# Patient Record
Sex: MALE | Race: WHITE | NOT HISPANIC OR LATINO | Employment: OTHER | URBAN - METROPOLITAN AREA
[De-identification: names, ages, dates, MRNs, and addresses within clinical notes are randomized per-mention and may not be internally consistent; named-entity substitution may affect disease eponyms.]

---

## 2017-10-30 ENCOUNTER — HOSPITAL ENCOUNTER (OUTPATIENT)
Dept: PHYSICAL THERAPY | Facility: HOSPITAL | Age: 82
Setting detail: RECURRING SERIES
Discharge: HOME | End: 2017-12-29
Attending: INTERNAL MEDICINE | Admitting: INTERNAL MEDICINE

## 2018-05-29 ENCOUNTER — HOSPITAL ENCOUNTER (OUTPATIENT)
Dept: PHYSICAL THERAPY | Facility: HOSPITAL | Age: 83
Setting detail: RECURRING SERIES
Discharge: HOME | End: 2018-07-24
Attending: INTERNAL MEDICINE | Admitting: INTERNAL MEDICINE

## 2023-01-18 ENCOUNTER — APPOINTMENT (OUTPATIENT)
Dept: GENERAL RADIOLOGY | Facility: HOSPITAL | Age: 88
End: 2023-01-18
Payer: MEDICARE

## 2023-01-18 ENCOUNTER — HOSPITAL ENCOUNTER (EMERGENCY)
Facility: HOSPITAL | Age: 88
Discharge: HOME | End: 2023-01-18
Attending: EMERGENCY MEDICINE | Admitting: EMERGENCY MEDICINE
Payer: MEDICARE

## 2023-01-18 ENCOUNTER — APPOINTMENT (OUTPATIENT)
Dept: CT IMAGING | Facility: HOSPITAL | Age: 88
End: 2023-01-18
Payer: MEDICARE

## 2023-01-18 VITALS
DIASTOLIC BLOOD PRESSURE: 62 MMHG | BODY MASS INDEX: 27.94 KG/M2 | TEMPERATURE: 98.9 F | RESPIRATION RATE: 19 BRPM | HEIGHT: 67 IN | WEIGHT: 178 LBS | HEART RATE: 83 BPM | SYSTOLIC BLOOD PRESSURE: 115 MMHG | OXYGEN SATURATION: 97 %

## 2023-01-18 DIAGNOSIS — E11.65 UNCONTROLLED TYPE 2 DIABETES MELLITUS WITH HYPERGLYCEMIA: ICD-10-CM

## 2023-01-18 DIAGNOSIS — R53.1 WEAKNESS: ICD-10-CM

## 2023-01-18 DIAGNOSIS — J18.9 PNEUMONIA OF RIGHT UPPER LOBE DUE TO INFECTIOUS ORGANISM: Primary | ICD-10-CM

## 2023-01-18 LAB
ALBUMIN SERPL-MCNC: 3.6 G/DL (ref 3.5–5.2)
ALBUMIN/GLOB SERPL: 1.3 G/DL
ALP SERPL-CCNC: 117 U/L (ref 39–117)
ALT SERPL W P-5'-P-CCNC: 11 U/L (ref 1–41)
ANION GAP SERPL CALCULATED.3IONS-SCNC: 11 MMOL/L (ref 5–15)
AST SERPL-CCNC: 20 U/L (ref 1–40)
B PARAPERT DNA SPEC QL NAA+PROBE: NOT DETECTED
B PERT DNA SPEC QL NAA+PROBE: NOT DETECTED
BACTERIA UR QL AUTO: NORMAL /HPF
BASOPHILS # BLD AUTO: 0 10*3/MM3 (ref 0–0.2)
BASOPHILS NFR BLD AUTO: 0.3 % (ref 0–1.5)
BILIRUB SERPL-MCNC: 0.7 MG/DL (ref 0–1.2)
BILIRUB UR QL STRIP: NEGATIVE
BUN SERPL-MCNC: 20 MG/DL (ref 8–23)
BUN/CREAT SERPL: 20.6 (ref 7–25)
C PNEUM DNA NPH QL NAA+NON-PROBE: NOT DETECTED
CALCIUM SPEC-SCNC: 9.3 MG/DL (ref 8.2–9.6)
CHLORIDE SERPL-SCNC: 96 MMOL/L (ref 98–107)
CLARITY UR: CLEAR
CO2 SERPL-SCNC: 21 MMOL/L (ref 22–29)
COLOR UR: YELLOW
CREAT SERPL-MCNC: 0.97 MG/DL (ref 0.76–1.27)
D-LACTATE SERPL-SCNC: 2.5 MMOL/L (ref 0.5–2)
DEPRECATED RDW RBC AUTO: 47.7 FL (ref 37–54)
EGFRCR SERPLBLD CKD-EPI 2021: 71.9 ML/MIN/1.73
EOSINOPHIL # BLD AUTO: 0 10*3/MM3 (ref 0–0.4)
EOSINOPHIL NFR BLD AUTO: 0 % (ref 0.3–6.2)
ERYTHROCYTE [DISTWIDTH] IN BLOOD BY AUTOMATED COUNT: 14.8 % (ref 12.3–15.4)
FLUAV SUBTYP SPEC NAA+PROBE: NOT DETECTED
FLUBV RNA ISLT QL NAA+PROBE: NOT DETECTED
GLOBULIN UR ELPH-MCNC: 2.7 GM/DL
GLUCOSE BLDC GLUCOMTR-MCNC: 350 MG/DL (ref 70–105)
GLUCOSE SERPL-MCNC: 358 MG/DL (ref 65–99)
GLUCOSE UR STRIP-MCNC: ABNORMAL MG/DL
HADV DNA SPEC NAA+PROBE: NOT DETECTED
HCOV 229E RNA SPEC QL NAA+PROBE: NOT DETECTED
HCOV HKU1 RNA SPEC QL NAA+PROBE: NOT DETECTED
HCOV NL63 RNA SPEC QL NAA+PROBE: NOT DETECTED
HCOV OC43 RNA SPEC QL NAA+PROBE: NOT DETECTED
HCT VFR BLD AUTO: 35.1 % (ref 37.5–51)
HGB BLD-MCNC: 12.2 G/DL (ref 13–17.7)
HGB UR QL STRIP.AUTO: ABNORMAL
HMPV RNA NPH QL NAA+NON-PROBE: NOT DETECTED
HOLD SPECIMEN: NORMAL
HOLD SPECIMEN: NORMAL
HPIV1 RNA ISLT QL NAA+PROBE: NOT DETECTED
HPIV2 RNA SPEC QL NAA+PROBE: NOT DETECTED
HPIV3 RNA NPH QL NAA+PROBE: NOT DETECTED
HPIV4 P GENE NPH QL NAA+PROBE: NOT DETECTED
HYALINE CASTS UR QL AUTO: NORMAL /LPF
KETONES UR QL STRIP: ABNORMAL
LEUKOCYTE ESTERASE UR QL STRIP.AUTO: NEGATIVE
LYMPHOCYTES # BLD AUTO: 0.6 10*3/MM3 (ref 0.7–3.1)
LYMPHOCYTES NFR BLD AUTO: 5 % (ref 19.6–45.3)
M PNEUMO IGG SER IA-ACNC: NOT DETECTED
MCH RBC QN AUTO: 31.1 PG (ref 26.6–33)
MCHC RBC AUTO-ENTMCNC: 34.9 G/DL (ref 31.5–35.7)
MCV RBC AUTO: 89.3 FL (ref 79–97)
MONOCYTES # BLD AUTO: 0.7 10*3/MM3 (ref 0.1–0.9)
MONOCYTES NFR BLD AUTO: 5.9 % (ref 5–12)
NEUTROPHILS NFR BLD AUTO: 10.6 10*3/MM3 (ref 1.7–7)
NEUTROPHILS NFR BLD AUTO: 88.8 % (ref 42.7–76)
NITRITE UR QL STRIP: NEGATIVE
NRBC BLD AUTO-RTO: 0 /100 WBC (ref 0–0.2)
NT-PROBNP SERPL-MCNC: 874.1 PG/ML (ref 0–1800)
PH UR STRIP.AUTO: 5.5 [PH] (ref 5–8)
PLATELET # BLD AUTO: 202 10*3/MM3 (ref 140–450)
PMV BLD AUTO: 6.3 FL (ref 6–12)
POTASSIUM SERPL-SCNC: 4.5 MMOL/L (ref 3.5–5.2)
PROT SERPL-MCNC: 6.3 G/DL (ref 6–8.5)
PROT UR QL STRIP: NEGATIVE
RBC # BLD AUTO: 3.93 10*6/MM3 (ref 4.14–5.8)
RBC # UR STRIP: NORMAL /HPF
REF LAB TEST METHOD: NORMAL
RHINOVIRUS RNA SPEC NAA+PROBE: NOT DETECTED
RSV RNA NPH QL NAA+NON-PROBE: NOT DETECTED
SARS-COV-2 RNA NPH QL NAA+NON-PROBE: NOT DETECTED
SODIUM SERPL-SCNC: 128 MMOL/L (ref 136–145)
SP GR UR STRIP: 1.02 (ref 1–1.03)
SQUAMOUS #/AREA URNS HPF: NORMAL /HPF
TROPONIN T SERPL-MCNC: <0.01 NG/ML (ref 0–0.03)
UROBILINOGEN UR QL STRIP: ABNORMAL
WBC # UR STRIP: NORMAL /HPF
WBC NRBC COR # BLD: 11.9 10*3/MM3 (ref 3.4–10.8)
WHOLE BLOOD HOLD COAG: NORMAL
WHOLE BLOOD HOLD SPECIMEN: NORMAL

## 2023-01-18 PROCEDURE — 96365 THER/PROPH/DIAG IV INF INIT: CPT

## 2023-01-18 PROCEDURE — 25010000002 AZITHROMYCIN PER 500 MG: Performed by: EMERGENCY MEDICINE

## 2023-01-18 PROCEDURE — 82962 GLUCOSE BLOOD TEST: CPT

## 2023-01-18 PROCEDURE — 25010000002 CEFTRIAXONE PER 250 MG: Performed by: EMERGENCY MEDICINE

## 2023-01-18 PROCEDURE — 85025 COMPLETE CBC W/AUTO DIFF WBC: CPT | Performed by: NURSE PRACTITIONER

## 2023-01-18 PROCEDURE — 84484 ASSAY OF TROPONIN QUANT: CPT | Performed by: NURSE PRACTITIONER

## 2023-01-18 PROCEDURE — 0202U NFCT DS 22 TRGT SARS-COV-2: CPT | Performed by: NURSE PRACTITIONER

## 2023-01-18 PROCEDURE — 36415 COLL VENOUS BLD VENIPUNCTURE: CPT

## 2023-01-18 PROCEDURE — 70450 CT HEAD/BRAIN W/O DYE: CPT

## 2023-01-18 PROCEDURE — 87040 BLOOD CULTURE FOR BACTERIA: CPT | Performed by: EMERGENCY MEDICINE

## 2023-01-18 PROCEDURE — 81001 URINALYSIS AUTO W/SCOPE: CPT | Performed by: NURSE PRACTITIONER

## 2023-01-18 PROCEDURE — 83880 ASSAY OF NATRIURETIC PEPTIDE: CPT | Performed by: NURSE PRACTITIONER

## 2023-01-18 PROCEDURE — 96367 TX/PROPH/DG ADDL SEQ IV INF: CPT

## 2023-01-18 PROCEDURE — 71045 X-RAY EXAM CHEST 1 VIEW: CPT

## 2023-01-18 PROCEDURE — 99284 EMERGENCY DEPT VISIT MOD MDM: CPT

## 2023-01-18 PROCEDURE — 83605 ASSAY OF LACTIC ACID: CPT

## 2023-01-18 PROCEDURE — C9803 HOPD COVID-19 SPEC COLLECT: HCPCS | Performed by: NURSE PRACTITIONER

## 2023-01-18 PROCEDURE — 80053 COMPREHEN METABOLIC PANEL: CPT | Performed by: NURSE PRACTITIONER

## 2023-01-18 PROCEDURE — 93005 ELECTROCARDIOGRAM TRACING: CPT | Performed by: NURSE PRACTITIONER

## 2023-01-18 RX ORDER — AZITHROMYCIN 250 MG/1
250 TABLET, FILM COATED ORAL DAILY
Qty: 4 TABLET | Refills: 0 | Status: SHIPPED | OUTPATIENT
Start: 2023-01-18

## 2023-01-18 RX ORDER — SODIUM CHLORIDE 0.9 % (FLUSH) 0.9 %
10 SYRINGE (ML) INJECTION AS NEEDED
Status: DISCONTINUED | OUTPATIENT
Start: 2023-01-18 | End: 2023-01-18 | Stop reason: HOSPADM

## 2023-01-18 RX ORDER — CEFDINIR 300 MG/1
300 CAPSULE ORAL 2 TIMES DAILY
Qty: 14 CAPSULE | Refills: 0 | Status: SHIPPED | OUTPATIENT
Start: 2023-01-18

## 2023-01-18 RX ADMIN — AZITHROMYCIN MONOHYDRATE 500 MG: 500 INJECTION, POWDER, LYOPHILIZED, FOR SOLUTION INTRAVENOUS at 19:24

## 2023-01-18 RX ADMIN — SODIUM CHLORIDE, POTASSIUM CHLORIDE, SODIUM LACTATE AND CALCIUM CHLORIDE 500 ML: 600; 310; 30; 20 INJECTION, SOLUTION INTRAVENOUS at 18:25

## 2023-01-18 RX ADMIN — CEFTRIAXONE 1 G: 1 INJECTION, POWDER, FOR SOLUTION INTRAMUSCULAR; INTRAVENOUS at 18:25

## 2023-01-18 RX ADMIN — SODIUM CHLORIDE 500 ML: 9 INJECTION, SOLUTION INTRAVENOUS at 19:24

## 2023-01-18 NOTE — ED PROVIDER NOTES
Subjective      Provider in Triage Note  Patient is a 89-year-old gentleman who comes in from home that the family states he is normally alert oriented and active and normally walks today they state he has had decreased alertness and extreme weakness which has left him unable to walk today and they are concerned.  The patient denies pain.  Denies fever chills nausea or vomiting.      History of Present Illness  Patient is having no weakness this time.  No complaints of pain.  He does have occasional cough that he reports is not new.  He had not had fever at home that was aware of.  Reports no urinary symptoms.  He did have 1 episode of vomiting last night after eating pie but not since no diarrhea.  He had not taken his medication today.  History was obtained from patient and also his wife and daughters  Review of Systems    No past medical history on file.  Paroxysmal atrial fibrillation diabetes type 2 hyperlipidemia hypertension  Allergies   Allergen Reactions   • Celebrex [Celecoxib] Hives       No past surgical history on file.    No family history on file.    Social History     Socioeconomic History   • Marital status:            Objective   Physical Exam  89-year-old male awake alert.  Generally well-developed well-nourished.  Pupils equal round react light.  Neck supple chest clear cardiovascular regular rate and rhythm abdomen soft nontender.  Extremities without tenderness or edema.  Neurologic Kal[place] without focal findings noted he has intact range of motion of all extremities without deficit.  He is able to stand and take a couple steps with she reports he is at baseline 4.  Procedures           ED Course      Results for orders placed or performed during the hospital encounter of 01/18/23   Respiratory Panel PCR w/COVID-19(SARS-CoV-2) SHAY/BUTCH/FIORDALIZA/PAD/COR/MAD/RICARDA In-House, NP Swab in UTM/VTM, 3-4 HR TAT - Swab, Nasopharynx    Specimen: Nasopharynx; Swab   Result Value Ref Range    ADENOVIRUS, PCR Not  Detected Not Detected    Coronavirus 229E Not Detected Not Detected    Coronavirus HKU1 Not Detected Not Detected    Coronavirus NL63 Not Detected Not Detected    Coronavirus OC43 Not Detected Not Detected    COVID19 Not Detected Not Detected - Ref. Range    Human Metapneumovirus Not Detected Not Detected    Human Rhinovirus/Enterovirus Not Detected Not Detected    Influenza A PCR Not Detected Not Detected    Influenza B PCR Not Detected Not Detected    Parainfluenza Virus 1 Not Detected Not Detected    Parainfluenza Virus 2 Not Detected Not Detected    Parainfluenza Virus 3 Not Detected Not Detected    Parainfluenza Virus 4 Not Detected Not Detected    RSV, PCR Not Detected Not Detected    Bordetella pertussis pcr Not Detected Not Detected    Bordetella parapertussis PCR Not Detected Not Detected    Chlamydophila pneumoniae PCR Not Detected Not Detected    Mycoplasma pneumo by PCR Not Detected Not Detected   Comprehensive Metabolic Panel    Specimen: Arm, Left; Blood   Result Value Ref Range    Glucose 358 (H) 65 - 99 mg/dL    BUN 20 8 - 23 mg/dL    Creatinine 0.97 0.76 - 1.27 mg/dL    Sodium 128 (L) 136 - 145 mmol/L    Potassium 4.5 3.5 - 5.2 mmol/L    Chloride 96 (L) 98 - 107 mmol/L    CO2 21.0 (L) 22.0 - 29.0 mmol/L    Calcium 9.3 8.2 - 9.6 mg/dL    Total Protein 6.3 6.0 - 8.5 g/dL    Albumin 3.6 3.5 - 5.2 g/dL    ALT (SGPT) 11 1 - 41 U/L    AST (SGOT) 20 1 - 40 U/L    Alkaline Phosphatase 117 39 - 117 U/L    Total Bilirubin 0.7 0.0 - 1.2 mg/dL    Globulin 2.7 gm/dL    A/G Ratio 1.3 g/dL    BUN/Creatinine Ratio 20.6 7.0 - 25.0    Anion Gap 11.0 5.0 - 15.0 mmol/L    eGFR 71.9 >60.0 mL/min/1.73   Urinalysis With Culture If Indicated - Urine, Clean Catch    Specimen: Urine, Clean Catch   Result Value Ref Range    Color, UA Yellow Yellow, Straw    Appearance, UA Clear Clear    pH, UA 5.5 5.0 - 8.0    Specific Gravity, UA 1.020 1.005 - 1.030    Glucose,  mg/dL (2+) (A) Negative    Ketones, UA Trace (A) Negative     Bilirubin, UA Negative Negative    Blood, UA Moderate (2+) (A) Negative    Protein, UA Negative Negative    Leuk Esterase, UA Negative Negative    Nitrite, UA Negative Negative    Urobilinogen, UA 0.2 E.U./dL 0.2 - 1.0 E.U./dL   BNP    Specimen: Arm, Left; Blood   Result Value Ref Range    proBNP 874.1 0.0 - 1,800.0 pg/mL   Troponin    Specimen: Arm, Left; Blood   Result Value Ref Range    Troponin T <0.010 0.000 - 0.030 ng/mL   CBC Auto Differential    Specimen: Arm, Left; Blood   Result Value Ref Range    WBC 11.90 (H) 3.40 - 10.80 10*3/mm3    RBC 3.93 (L) 4.14 - 5.80 10*6/mm3    Hemoglobin 12.2 (L) 13.0 - 17.7 g/dL    Hematocrit 35.1 (L) 37.5 - 51.0 %    MCV 89.3 79.0 - 97.0 fL    MCH 31.1 26.6 - 33.0 pg    MCHC 34.9 31.5 - 35.7 g/dL    RDW 14.8 12.3 - 15.4 %    RDW-SD 47.7 37.0 - 54.0 fl    MPV 6.3 6.0 - 12.0 fL    Platelets 202 140 - 450 10*3/mm3    Neutrophil % 88.8 (H) 42.7 - 76.0 %    Lymphocyte % 5.0 (L) 19.6 - 45.3 %    Monocyte % 5.9 5.0 - 12.0 %    Eosinophil % 0.0 (L) 0.3 - 6.2 %    Basophil % 0.3 0.0 - 1.5 %    Neutrophils, Absolute 10.60 (H) 1.70 - 7.00 10*3/mm3    Lymphocytes, Absolute 0.60 (L) 0.70 - 3.10 10*3/mm3    Monocytes, Absolute 0.70 0.10 - 0.90 10*3/mm3    Eosinophils, Absolute 0.00 0.00 - 0.40 10*3/mm3    Basophils, Absolute 0.00 0.00 - 0.20 10*3/mm3    nRBC 0.0 0.0 - 0.2 /100 WBC   Urinalysis, Microscopic Only - Urine, Clean Catch    Specimen: Urine, Clean Catch   Result Value Ref Range    RBC, UA None Seen None Seen /HPF    WBC, UA None Seen None Seen /HPF    Bacteria, UA None Seen None Seen /HPF    Squamous Epithelial Cells, UA None Seen None Seen, 0-2 /HPF    Hyaline Casts, UA None Seen None Seen /LPF    Methodology Manual Light Microscopy    POC Glucose Once    Specimen: Blood   Result Value Ref Range    Glucose 350 (H) 70 - 105 mg/dL   POC Lactate    Specimen: Blood   Result Value Ref Range    Lactate 2.5 (C) 0.5 - 2.0 mmol/L   ECG 12 Lead Altered Mental Status   Result Value Ref  "Range    QT Interval 427 ms   Green Top (Gel)   Result Value Ref Range    Extra Tube Hold for add-ons.    Lavender Top   Result Value Ref Range    Extra Tube hold for add-on    Gold Top - SST   Result Value Ref Range    Extra Tube Hold for add-ons.    Light Blue Top   Result Value Ref Range    Extra Tube Hold for add-ons.      CT Head Without Contrast    Result Date: 1/18/2023  Impression: 1. Generalized atrophy with no acute intracranial findings. 2. Chronic right maxillary sinus disease. Electronically Signed: Jose Cassidy  1/18/2023 5:58 PM EST  Workstation ID: [id number]    XR Chest 1 View    Result Date: 1/18/2023  Impression: Right upper lobe infiltrate consistent with pneumonia. Electronically Signed: Jose Ayalaink  1/18/2023 4:18 PM EST  Workstation ID: [id number]    Medications   lactated ringers bolus 500 mL (0 mL Intravenous Stopped 1/18/23 1916)   azithromycin (ZITHROMAX) 500 mg in sodium chloride 0.9 % 250 mL IVPB-VTB (0 mg Intravenous Stopped 1/18/23 2045)   cefTRIAXone (ROCEPHIN) 1 g in sodium chloride 0.9 % 100 mL IVPB (0 g Intravenous Stopped 1/18/23 1855)   sodium chloride 0.9 % bolus 500 mL (0 mL Intravenous Stopped 1/18/23 2045)     /62   Pulse 83   Temp 98.9 °F (37.2 °C)   Resp 19   Ht 170.2 cm (67\")   Wt 80.7 kg (178 lb)   SpO2 97%   BMI 27.88 kg/m²                                        MDM  Chart review: Patient had cardiology appointment September 15 for paroxysmal atrial fibrillation and second-degree AV block.  He is noted to have pacemaker and noted that he is anticoagulated for his atrial fibrillation with Eliquis.  He is noted to be predominately pacemaker dependent.  Comorbidity: As per past history  Differential: Pneumonia, UTI, viral illness,  My EKG interpretation: Ventricular paced rhythm no previous for comparison  Lab: Respiratory panel all negative.  Urinalysis no white cells no red cells no bacteria or leukocytes.  White count 11.9 with 88 segs no bands on differential. "  Troponin negative BNP normal comprehensive metabolic panel sodium 128 glucose 358.  Lactic acid 2.5  Radiology: I reviewed chest x-ray and independently interpreted.  He has mild right upper lobe infiltrate.  Discussion/treatment: Patient IV placed.  He was given 500 cc lactated Ringer's.  He was given Rocephin and Zithromax.  He had not taken his medication for his diabetes today.  Review of his chart shows his previous sodium had been 136 3 years ago.  Findings were discussed with patient and family.  Disposition was discussed they desired to go home.  He was placed on Omnicef and Zithromax to complete a 5-day course.  They are aware to return for any changes or further problems or if they feel he has not doing well at home.  Patient was evaluated using appropriate PPE      Final diagnoses:   Pneumonia of right upper lobe due to infectious organism   Weakness   Uncontrolled type 2 diabetes mellitus with hyperglycemia (HCC)       ED Disposition  ED Disposition     ED Disposition   Discharge    Condition   Stable    Comment   --             Savanna Arreguin MD  [address] [place] [address]  [phone]    Schedule an appointment as soon as possible for a visit   Next week         Medication List      New Prescriptions    azithromycin 250 MG tablet  Commonly known as: ZITHROMAX  Take 1 tablet by mouth Daily.     cefdinir 300 MG capsule  Commonly known as: OMNICEF  Take 1 capsule by mouth 2 (Two) Times a Day.           Where to Get Your Medications      These medications were sent to [id number] - [place], [place] - [address] [facility] AT [facility] [address] - [phone]  - [phone] FX  [address] [facility] [place] [address]    Phone: [phone]   · azithromycin 250 MG tablet  · cefdinir 300 MG capsule          Juan Hawk MD  01/18/23 9270

## 2023-01-18 NOTE — DISCHARGE INSTRUCTIONS
Plenty of fluids, may use Tylenol for any fever, return for any further problems increased weakness shortness of breath or as needed.  If feel he is not doing well at home return for admission.

## 2023-01-21 LAB — QT INTERVAL: 427 MS

## 2023-01-23 LAB
BACTERIA SPEC AEROBE CULT: NORMAL
BACTERIA SPEC AEROBE CULT: NORMAL

## 2023-03-06 ENCOUNTER — HOSPITAL ENCOUNTER (OUTPATIENT)
Dept: GENERAL RADIOLOGY | Facility: HOSPITAL | Age: 88
Discharge: HOME | End: 2023-03-06
Admitting: INTERNAL MEDICINE
Payer: MEDICARE

## 2023-03-06 DIAGNOSIS — J18.9 PNEUMONIA OF RIGHT UPPER LOBE DUE TO INFECTIOUS ORGANISM: ICD-10-CM

## 2023-03-06 PROCEDURE — 71046 X-RAY EXAM CHEST 2 VIEWS: CPT

## 2023-07-11 NOTE — ED NOTES
Pt alert and oriented family reports hyperglycemia at home and generalized weakness. Pt denies any c/o pain at this time   no

## 2024-02-12 ENCOUNTER — HOSPITAL ENCOUNTER (EMERGENCY)
Facility: HOSPITAL | Age: 89
Discharge: HOME | End: 2024-02-12
Attending: EMERGENCY MEDICINE | Admitting: EMERGENCY MEDICINE
Payer: MEDICARE

## 2024-02-12 VITALS
BODY MASS INDEX: 27.94 KG/M2 | DIASTOLIC BLOOD PRESSURE: 84 MMHG | HEIGHT: 67 IN | HEART RATE: 76 BPM | WEIGHT: 178 LBS | RESPIRATION RATE: 16 BRPM | OXYGEN SATURATION: 99 % | SYSTOLIC BLOOD PRESSURE: 176 MMHG | TEMPERATURE: 97.4 F
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
## 2024-02-12 DIAGNOSIS — S51.012A SKIN TEAR OF LEFT ELBOW WITHOUT COMPLICATION, INITIAL ENCOUNTER: ICD-10-CM

## 2024-02-12 PROCEDURE — 99283 EMERGENCY DEPT VISIT LOW MDM: CPT

## 2024-06-24 ENCOUNTER — HOSPITAL ENCOUNTER (OUTPATIENT)
Dept: GENERAL RADIOLOGY | Facility: HOSPITAL | Age: 89
Discharge: HOME | End: 2024-06-24
Payer: MEDICARE

## 2024-06-24 ENCOUNTER — TRANSCRIBE ORDERS (OUTPATIENT)
Dept: ADMINISTRATIVE | Facility: HOSPITAL | Age: 89
End: 2024-06-24
Payer: MEDICARE

## 2024-06-24 ENCOUNTER — LAB (OUTPATIENT)
Dept: LAB | Facility: HOSPITAL | Age: 89
End: 2024-06-24
Payer: MEDICARE

## 2024-06-24 DIAGNOSIS — I48.91 ATRIAL FIBRILLATION, UNSPECIFIED TYPE: Primary | ICD-10-CM

## 2024-06-24 DIAGNOSIS — I48.91 ATRIAL FIBRILLATION, UNSPECIFIED TYPE: ICD-10-CM

## 2024-06-24 DIAGNOSIS — R06.09 DYSPNEA ON EXERTION: ICD-10-CM

## 2024-06-24 LAB
ALBUMIN SERPL-MCNC: 3.8 G/DL (ref 3.5–5.2)
ALBUMIN/GLOB SERPL: 1.4 G/DL
ALP SERPL-CCNC: 116 U/L (ref 39–117)
ALT SERPL W P-5'-P-CCNC: 8 U/L (ref 1–41)
ANION GAP SERPL CALCULATED.3IONS-SCNC: 12 MMOL/L (ref 5–15)
AST SERPL-CCNC: 17 U/L (ref 1–40)
BASOPHILS # BLD AUTO: 0.02 10*3/MM3 (ref 0–0.2)
BASOPHILS NFR BLD AUTO: 0.4 % (ref 0–1.5)
BILIRUB SERPL-MCNC: 0.5 MG/DL (ref 0–1.2)
BUN SERPL-MCNC: 13 MG/DL (ref 8–23)
BUN/CREAT SERPL: 13.4 (ref 7–25)
CALCIUM SPEC-SCNC: 9.4 MG/DL (ref 8.2–9.6)
CHLORIDE SERPL-SCNC: 98 MMOL/L (ref 98–107)
CO2 SERPL-SCNC: 24 MMOL/L (ref 22–29)
CREAT SERPL-MCNC: 0.97 MG/DL (ref 0.76–1.27)
DEPRECATED RDW RBC AUTO: 45.1 FL (ref 37–54)
EGFRCR SERPLBLD CKD-EPI 2021: 71.4 ML/MIN/1.73
EOSINOPHIL # BLD AUTO: 0.06 10*3/MM3 (ref 0–0.4)
EOSINOPHIL NFR BLD AUTO: 1.3 % (ref 0.3–6.2)
ERYTHROCYTE [DISTWIDTH] IN BLOOD BY AUTOMATED COUNT: 13.7 % (ref 12.3–15.4)
GLOBULIN UR ELPH-MCNC: 2.8 GM/DL
GLUCOSE SERPL-MCNC: 274 MG/DL (ref 65–99)
HBA1C MFR BLD: 8.1 % (ref 4.8–5.6)
HCT VFR BLD AUTO: 34.3 % (ref 37.5–51)
HGB BLD-MCNC: 11.3 G/DL (ref 13–17.7)
IMM GRANULOCYTES # BLD AUTO: 0.01 10*3/MM3 (ref 0–0.05)
IMM GRANULOCYTES NFR BLD AUTO: 0.2 % (ref 0–0.5)
LYMPHOCYTES # BLD AUTO: 1.48 10*3/MM3 (ref 0.7–3.1)
LYMPHOCYTES NFR BLD AUTO: 32.1 % (ref 19.6–45.3)
MCH RBC QN AUTO: 29.8 PG (ref 26.6–33)
MCHC RBC AUTO-ENTMCNC: 32.9 G/DL (ref 31.5–35.7)
MCV RBC AUTO: 90.5 FL (ref 79–97)
MONOCYTES # BLD AUTO: 0.55 10*3/MM3 (ref 0.1–0.9)
MONOCYTES NFR BLD AUTO: 11.9 % (ref 5–12)
NEUTROPHILS NFR BLD AUTO: 2.49 10*3/MM3 (ref 1.7–7)
NEUTROPHILS NFR BLD AUTO: 54.1 % (ref 42.7–76)
NRBC BLD AUTO-RTO: 0 /100 WBC (ref 0–0.2)
NT-PROBNP SERPL-MCNC: 2595 PG/ML (ref 0–1800)
PLATELET # BLD AUTO: 181 10*3/MM3 (ref 140–450)
PMV BLD AUTO: 8.1 FL (ref 6–12)
POTASSIUM SERPL-SCNC: 4.6 MMOL/L (ref 3.5–5.2)
PROT SERPL-MCNC: 6.6 G/DL (ref 6–8.5)
RBC # BLD AUTO: 3.79 10*6/MM3 (ref 4.14–5.8)
SODIUM SERPL-SCNC: 134 MMOL/L (ref 136–145)
TSH SERPL DL<=0.05 MIU/L-ACNC: 3.91 UIU/ML (ref 0.27–4.2)
VIT B12 BLD-MCNC: 649 PG/ML (ref 211–946)
WBC NRBC COR # BLD AUTO: 4.61 10*3/MM3 (ref 3.4–10.8)

## 2024-06-24 PROCEDURE — 83880 ASSAY OF NATRIURETIC PEPTIDE: CPT

## 2024-06-24 PROCEDURE — 71046 X-RAY EXAM CHEST 2 VIEWS: CPT

## 2024-06-24 PROCEDURE — 80053 COMPREHEN METABOLIC PANEL: CPT

## 2024-06-24 PROCEDURE — 82607 VITAMIN B-12: CPT

## 2024-06-24 PROCEDURE — 36415 COLL VENOUS BLD VENIPUNCTURE: CPT

## 2024-06-24 PROCEDURE — 84443 ASSAY THYROID STIM HORMONE: CPT

## 2024-06-24 PROCEDURE — 85025 COMPLETE CBC W/AUTO DIFF WBC: CPT

## 2024-06-24 PROCEDURE — 83036 HEMOGLOBIN GLYCOSYLATED A1C: CPT

## 2024-11-26 ENCOUNTER — HOSPITAL ENCOUNTER (INPATIENT)
Facility: HOSPITAL | Age: 89
LOS: 13 days | Discharge: NURSING, REHAB OR LONG-TERM CARE | DRG: 947 | End: 2024-12-10
Attending: EMERGENCY MEDICINE | Admitting: INTERNAL MEDICINE
Payer: MEDICARE

## 2024-11-26 ENCOUNTER — APPOINTMENT (OUTPATIENT)
Dept: GENERAL RADIOLOGY | Facility: HOSPITAL | Age: 89
DRG: 947 | End: 2024-11-26
Payer: MEDICARE

## 2024-11-26 DIAGNOSIS — S80.211A ABRASION OF KNEE, BILATERAL: ICD-10-CM
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
## 2024-11-26 DIAGNOSIS — S80.212A ABRASION OF KNEE, BILATERAL: ICD-10-CM

## 2024-11-26 DIAGNOSIS — M25.552 LEFT HIP PAIN: ICD-10-CM

## 2024-11-26 DIAGNOSIS — M25.561 ACUTE PAIN OF BOTH KNEES: ICD-10-CM

## 2024-11-26 DIAGNOSIS — M25.562 ACUTE PAIN OF BOTH KNEES: ICD-10-CM

## 2024-11-26 DIAGNOSIS — R53.1 WEAKNESS: Primary | ICD-10-CM

## 2024-11-26 LAB
ALBUMIN SERPL-MCNC: 4 G/DL (ref 3.5–5.2)
ALBUMIN/GLOB SERPL: 1.4 G/DL
ALP SERPL-CCNC: 101 U/L (ref 39–117)
ALT SERPL W P-5'-P-CCNC: 16 U/L (ref 1–41)
ANION GAP SERPL CALCULATED.3IONS-SCNC: 14.3 MMOL/L (ref 5–15)
AST SERPL-CCNC: 29 U/L (ref 1–40)
BASOPHILS # BLD AUTO: 0.01 10*3/MM3 (ref 0–0.2)
BASOPHILS NFR BLD AUTO: 0.2 % (ref 0–1.5)
BILIRUB SERPL-MCNC: 0.8 MG/DL (ref 0–1.2)
BILIRUB UR QL STRIP: NEGATIVE
BUN SERPL-MCNC: 23 MG/DL (ref 8–23)
BUN/CREAT SERPL: 18.1 (ref 7–25)
CALCIUM SPEC-SCNC: 9.6 MG/DL (ref 8.2–9.6)
CHLORIDE SERPL-SCNC: 93 MMOL/L (ref 98–107)
CLARITY UR: CLEAR
CO2 SERPL-SCNC: 24.7 MMOL/L (ref 22–29)
COLOR UR: YELLOW
CREAT SERPL-MCNC: 1.27 MG/DL (ref 0.76–1.27)
DEPRECATED RDW RBC AUTO: 47.9 FL (ref 37–54)
EGFRCR SERPLBLD CKD-EPI 2021: 51.7 ML/MIN/1.73
EOSINOPHIL # BLD AUTO: 0.01 10*3/MM3 (ref 0–0.4)
EOSINOPHIL NFR BLD AUTO: 0.2 % (ref 0.3–6.2)
ERYTHROCYTE [DISTWIDTH] IN BLOOD BY AUTOMATED COUNT: 14.2 % (ref 12.3–15.4)
GLOBULIN UR ELPH-MCNC: 2.9 GM/DL
GLUCOSE SERPL-MCNC: 148 MG/DL (ref 65–99)
GLUCOSE UR STRIP-MCNC: NEGATIVE MG/DL
HCT VFR BLD AUTO: 33.8 % (ref 37.5–51)
HGB BLD-MCNC: 11.3 G/DL (ref 13–17.7)
HGB UR QL STRIP.AUTO: NEGATIVE
IMM GRANULOCYTES # BLD AUTO: 0.03 10*3/MM3 (ref 0–0.05)
IMM GRANULOCYTES NFR BLD AUTO: 0.5 % (ref 0–0.5)
KETONES UR QL STRIP: NEGATIVE
LEUKOCYTE ESTERASE UR QL STRIP.AUTO: NEGATIVE
LYMPHOCYTES # BLD AUTO: 1.3 10*3/MM3 (ref 0.7–3.1)
LYMPHOCYTES NFR BLD AUTO: 20.3 % (ref 19.6–45.3)
MAGNESIUM SERPL-MCNC: 1.3 MG/DL (ref 1.7–2.3)
MCH RBC QN AUTO: 31 PG (ref 26.6–33)
MCHC RBC AUTO-ENTMCNC: 33.4 G/DL (ref 31.5–35.7)
MCV RBC AUTO: 92.6 FL (ref 79–97)
MONOCYTES # BLD AUTO: 0.68 10*3/MM3 (ref 0.1–0.9)
MONOCYTES NFR BLD AUTO: 10.6 % (ref 5–12)
NEUTROPHILS NFR BLD AUTO: 4.36 10*3/MM3 (ref 1.7–7)
NEUTROPHILS NFR BLD AUTO: 68.2 % (ref 42.7–76)
NITRITE UR QL STRIP: NEGATIVE
NRBC BLD AUTO-RTO: 0 /100 WBC (ref 0–0.2)
PH UR STRIP.AUTO: 5.5 [PH] (ref 5–8)
PLATELET # BLD AUTO: 190 10*3/MM3 (ref 140–450)
PMV BLD AUTO: 7.7 FL (ref 6–12)
POTASSIUM SERPL-SCNC: 4.5 MMOL/L (ref 3.5–5.2)
PROT SERPL-MCNC: 6.9 G/DL (ref 6–8.5)
PROT UR QL STRIP: NEGATIVE
RBC # BLD AUTO: 3.65 10*6/MM3 (ref 4.14–5.8)
SODIUM SERPL-SCNC: 132 MMOL/L (ref 136–145)
SP GR UR STRIP: 1.01 (ref 1–1.03)
UROBILINOGEN UR QL STRIP: NORMAL
WBC NRBC COR # BLD AUTO: 6.39 10*3/MM3 (ref 3.4–10.8)

## 2024-11-26 PROCEDURE — 73560 X-RAY EXAM OF KNEE 1 OR 2: CPT

## 2024-11-26 PROCEDURE — 85025 COMPLETE CBC W/AUTO DIFF WBC: CPT | Performed by: PHYSICIAN ASSISTANT

## 2024-11-26 PROCEDURE — 99285 EMERGENCY DEPT VISIT HI MDM: CPT

## 2024-11-26 PROCEDURE — G0378 HOSPITAL OBSERVATION PER HR: HCPCS

## 2024-11-26 PROCEDURE — 83735 ASSAY OF MAGNESIUM: CPT | Performed by: PHYSICIAN ASSISTANT

## 2024-11-26 PROCEDURE — 81003 URINALYSIS AUTO W/O SCOPE: CPT | Performed by: PHYSICIAN ASSISTANT

## 2024-11-26 PROCEDURE — 73502 X-RAY EXAM HIP UNI 2-3 VIEWS: CPT

## 2024-11-26 PROCEDURE — 80053 COMPREHEN METABOLIC PANEL: CPT | Performed by: PHYSICIAN ASSISTANT

## 2024-11-26 PROCEDURE — 25010000002 MAGNESIUM SULFATE 2 GM/50ML SOLUTION: Performed by: PHYSICIAN ASSISTANT

## 2024-11-26 RX ORDER — BISACODYL 10 MG
10 SUPPOSITORY, RECTAL RECTAL DAILY PRN
Status: DISCONTINUED | OUTPATIENT
Start: 2024-11-26 | End: 2024-12-10 | Stop reason: HOSPADM

## 2024-11-26 RX ORDER — AMIODARONE HYDROCHLORIDE 200 MG/1
200 TABLET ORAL DAILY
COMMUNITY
Start: 2024-10-09

## 2024-11-26 RX ORDER — DIAPER,BRIEF,INFANT-TODD,DISP
1 EACH MISCELLANEOUS ONCE
Status: COMPLETED | OUTPATIENT
Start: 2024-11-26 | End: 2024-11-26

## 2024-11-26 RX ORDER — FUROSEMIDE 20 MG/1
20 TABLET ORAL DAILY
COMMUNITY
End: 2024-12-10 | Stop reason: HOSPADM

## 2024-11-26 RX ORDER — SODIUM CHLORIDE 0.9 % (FLUSH) 0.9 %
10 SYRINGE (ML) INJECTION AS NEEDED
Status: DISCONTINUED | OUTPATIENT
Start: 2024-11-26 | End: 2024-12-08

## 2024-11-26 RX ORDER — SODIUM CHLORIDE 0.9 % (FLUSH) 0.9 %
10 SYRINGE (ML) INJECTION AS NEEDED
Status: DISCONTINUED | OUTPATIENT
Start: 2024-11-26 | End: 2024-12-10 | Stop reason: HOSPADM

## 2024-11-26 RX ORDER — AMOXICILLIN 250 MG
2 CAPSULE ORAL 2 TIMES DAILY PRN
Status: DISCONTINUED | OUTPATIENT
Start: 2024-11-26 | End: 2024-12-10 | Stop reason: HOSPADM

## 2024-11-26 RX ORDER — LOSARTAN POTASSIUM 100 MG/1
1 TABLET ORAL DAILY
COMMUNITY
Start: 2024-10-04 | End: 2024-12-10 | Stop reason: HOSPADM

## 2024-11-26 RX ORDER — PIOGLITAZONE 30 MG/1
1 TABLET ORAL DAILY
COMMUNITY
Start: 2024-08-29 | End: 2024-12-10 | Stop reason: HOSPADM

## 2024-11-26 RX ORDER — APIXABAN 5 MG/1
1 TABLET, FILM COATED ORAL EVERY 12 HOURS SCHEDULED
COMMUNITY
Start: 2024-10-05

## 2024-11-26 RX ORDER — SODIUM CHLORIDE 9 MG/ML
40 INJECTION, SOLUTION INTRAVENOUS AS NEEDED
Status: DISCONTINUED | OUTPATIENT
Start: 2024-11-26 | End: 2024-12-10 | Stop reason: HOSPADM

## 2024-11-26 RX ORDER — TAMSULOSIN HYDROCHLORIDE 0.4 MG/1
1 CAPSULE ORAL DAILY
COMMUNITY
Start: 2024-10-04

## 2024-11-26 RX ORDER — POLYETHYLENE GLYCOL 3350 17 G/17G
17 POWDER, FOR SOLUTION ORAL DAILY PRN
Status: DISCONTINUED | OUTPATIENT
Start: 2024-11-26 | End: 2024-12-10 | Stop reason: HOSPADM

## 2024-11-26 RX ORDER — PROPRANOLOL HYDROCHLORIDE 80 MG/1
1 TABLET ORAL DAILY
COMMUNITY
Start: 2024-10-08

## 2024-11-26 RX ORDER — MAGNESIUM SULFATE HEPTAHYDRATE 40 MG/ML
2 INJECTION, SOLUTION INTRAVENOUS ONCE
Status: COMPLETED | OUTPATIENT
Start: 2024-11-26 | End: 2024-11-26

## 2024-11-26 RX ORDER — BISACODYL 5 MG/1
5 TABLET, DELAYED RELEASE ORAL DAILY PRN
Status: DISCONTINUED | OUTPATIENT
Start: 2024-11-26 | End: 2024-12-10 | Stop reason: HOSPADM

## 2024-11-26 RX ORDER — SODIUM CHLORIDE 0.9 % (FLUSH) 0.9 %
10 SYRINGE (ML) INJECTION EVERY 12 HOURS SCHEDULED
Status: DISCONTINUED | OUTPATIENT
Start: 2024-11-26 | End: 2024-12-10 | Stop reason: HOSPADM

## 2024-11-26 RX ORDER — SIMVASTATIN 20 MG
1 TABLET ORAL DAILY
COMMUNITY
Start: 2024-10-04

## 2024-11-26 RX ADMIN — MAGNESIUM SULFATE HEPTAHYDRATE 2 G: 40 INJECTION, SOLUTION INTRAVENOUS at 19:37

## 2024-11-26 RX ADMIN — BACITRACIN 0.9 G: 500 OINTMENT TOPICAL at 16:25

## 2024-11-26 NOTE — LETTER
EMS Transport Request  For use at [facility], [facility], and [facility] only   Patient Name: Ap Roe : 1/15/1928   Weight:82.5 kg (181 lb 14.1 oz) Pick-up Location: [facility] BLS/ALS:    Insurance: [employer] MEDICARE REPLACEMENT Auth End Date:    Pre-Cert #: D/C Summary complete:    Destination: Other [place], [place]    Contact Precautions: None   Equipment (O2, Fluids, etc.): None   Arrive By Date/Time: Nursing Unit will call Stretcher/WC: Wheelchair   CM Requesting: Carmen Valentine RN Ext: [phone]   Notes/Medical Necessity: Moderate assist of 2      ______________________________________________________________________    *Only 2 patient bags OR 1 carry-on size bag are permitted.  Wheelchairs and walkers CANNOT transported with the patient. Acknowledge: Yes

## 2024-11-26 NOTE — ED PROVIDER NOTES
Subjective   History of Present Illness  Patient is a 89-year-old male presented via EMS from home after mechanical fall patient states he tripped going up the steps in his garage landing on his knees.  He has some abrasions noted on his knees bilaterally does report some pain in this region.  He denies pain elsewhere.  He denies any head injury or loss of consciousness.  He denies any lightheadedness or dizziness to cause the fall.  Denies any numbness or weakness of his legs.    History provided by:  Patient and spouse      Review of Systems   Constitutional:  Negative for fever.   Respiratory:  Negative for shortness of breath.    Cardiovascular:  Negative for chest pain.   Gastrointestinal:  Negative for nausea and vomiting.   Musculoskeletal:  Positive for arthralgias. Negative for joint swelling.   Skin:  Positive for wound.   Neurological:  Negative for weakness and numbness.       No past medical history on file.    Allergies   Allergen Reactions    Celebrex [Celecoxib] Hives       No past surgical history on file.    No family history on file.    Social History     Socioeconomic History    Marital status:            Objective   Physical Exam  Vitals and nursing note reviewed.   Constitutional:       General: He is not in acute distress.     Appearance: Normal appearance. He is well-developed. He is not ill-appearing, toxic-appearing or diaphoretic.   HENT:      Head: Normocephalic and atraumatic.      Mouth/Throat:      Mouth: Mucous membranes are moist.      Pharynx: Oropharynx is clear.   Eyes:      Extraocular Movements: Extraocular movements intact.      Pupils: Pupils are equal, round, and reactive to light.   Cardiovascular:      Rate and Rhythm: Normal rate and regular rhythm.      Pulses: Normal pulses.      Heart sounds: No murmur heard.     No friction rub. No gallop.   Pulmonary:      Effort: Pulmonary effort is normal. No tachypnea or accessory muscle usage.      Breath sounds: Normal  "breath sounds. No decreased breath sounds, wheezing, rhonchi or rales.   Chest:      Chest wall: No mass, deformity, tenderness or crepitus.   Musculoskeletal:      Right hip: No deformity, tenderness or bony tenderness. Normal range of motion.      Left hip: No deformity, tenderness or bony tenderness. Normal range of motion.      Right upper leg: No bony tenderness.      Left upper leg: No bony tenderness.      Right knee: No swelling or deformity. Normal range of motion. Tenderness present.      Left knee: No swelling or deformity. Normal range of motion. Tenderness present.      Right lower leg: No bony tenderness.      Left lower leg: No bony tenderness.      Right ankle: Normal.      Left ankle: Normal.      Right foot: Normal.      Left foot: Normal.        Legs:    Skin:     General: Skin is warm.      Capillary Refill: Capillary refill takes less than 2 seconds.      Findings: No rash.   Neurological:      Mental Status: He is alert and oriented to person, place, and time.   Psychiatric:         Mood and Affect: Mood normal.         Behavior: Behavior normal.         Procedures           ED Course  ED Course as of 11/26/24 2051 Tue Nov 26, 2024   1645 Daughter now at bedside states that when she was moving his leg around earlier he was complaining of some left hip pain.  He denied this on my exam but on repeat he does report it is a little tender in his left hip.  Will add on x-ray of hip [AA]   1645 Patient's daughter also states that he reported to her that he felt weak during the fall will add on basic labs  [AA]   1814 Tried to ambulate patient and he was very weak needed to person assist. This is not his baseline  [AA]      ED Course User Index  [AA] Lacey Dela Cruz PA    /66   Pulse 60   Temp 97.7 °F (36.5 °C) (Oral)   Resp 17   Ht 177.8 cm (70\")   Wt 80.7 kg (178 lb)   SpO2 97%   BMI 25.54 kg/m²   Medications   sodium chloride 0.9 % flush 10 mL (has no administration in time " range)   sodium chloride 0.9 % flush 10 mL (has no administration in time range)   sodium chloride 0.9 % flush 10 mL (has no administration in time range)   sodium chloride 0.9 % infusion 40 mL (has no administration in time range)   sennosides-docusate (PERICOLACE) 8.6-50 MG per tablet 2 tablet (has no administration in time range)     And   polyethylene glycol (MIRALAX) packet 17 g (has no administration in time range)     And   bisacodyl (DULCOLAX) EC tablet 5 mg (has no administration in time range)     And   bisacodyl (DULCOLAX) suppository 10 mg (has no administration in time range)   bacitracin ointment 0.9 g (0.9 g Topical Given 11/26/24 1625)   magnesium sulfate 2g/50 mL (PREMIX) infusion (2 g Intravenous New Bag 11/26/24 1937)       XR Hip With or Without Pelvis 2 - 3 View Left   Final Result   Impression:   Negative for acute osseous abnormality.            Electronically Signed: Damian Keen MD     11/26/2024 5:50 PM EST     Workstation ID: [id number]      XR Knee 1 or 2 View Bilateral   Final Result   Impression:   1.Degenerative changes in both knees.   2.No fractures are identified.   3.Soft tissue swelling laterally on the left.            Electronically Signed: Jose Cassidy MD     11/26/2024 4:18 PM EST     Workstation ID: [id number]                                                           Medical Decision Making  Labs: As above  Radiology: XR Hip With or Without Pelvis 2 - 3 View Left   Final Result    Impression: negative      Electronically Signed: Damian Keen MD      11/26/2024 5:50 PM EST      Workstation ID: [id number]     XR Knee 1 or 2 View Bilateral   Final Result    Impression:    1.Degenerative changes in both knees.    2.No fractures are identified.    3.Soft tissue swelling laterally on the left.      Electronically Signed: Jose Cassidy MD      11/26/2024 4:18 PM EST      Workstation ID: [id number]     Disposition/Treatment:  Appropriate PPE was worn during exam and throughout all  encounters with the patient.  Patient presents to the ED with reports of a fall earlier today.  Initially was complaining of some bilateral knee pain that was his only complaint later and ED stay patient's daughter came states he is actually been very weak this has been a progressive issue and they believe the weakness caused his fall.  She states he was complaining of some left hip pain to her as well on reassessment he is somewhat tender to palpation along his left hip added on x-ray of hip which showed no acute osseous abnormalities.  Labs were also obtained while in the ED which showed no signs of severe infection or dehydration.  Was found to have hyponatremia which is chronic and unchanged sodium today 132 magnesium was found to be low at 1.3 which was replaced while in the ED.  When we try to ambulate the patient in the ED he was not able to ambulate well not secondary to pain but due to weakness because of this patient will be placed in observation unit for further PT OT evaluation in the morning.  Patient and family were in agreement with plan all questions were answered.    Problems Addressed:  Abrasion of knee, bilateral: complicated acute illness or injury  Acute pain of both knees: complicated acute illness or injury  Fall, initial encounter: complicated acute illness or injury  Left hip pain: complicated acute illness or injury  Weakness: complicated acute illness or injury    Amount and/or Complexity of Data Reviewed  Labs: ordered.  Radiology: ordered.    Risk  OTC drugs.  Prescription drug management.  Decision regarding hospitalization.        Final diagnoses:   Weakness   Fall, initial encounter   Abrasion of knee, bilateral   Acute pain of both knees   Left hip pain       ED Disposition  ED Disposition       ED Disposition   Decision to Admit    Condition   --    Comment   --               No follow-up provider specified.       Medication List      No changes were made to your prescriptions  during this visit.            Lacey Dela Cruz PA  11/26/24 2051

## 2024-11-26 NOTE — LETTER
[facility] CASE MANAGEMENT  [address] [place] [address] [place] [address]  [phone]  [phone]        December 5, 2024      Patient: Ap Roe  YOB: 1928  Date of Visit: 11/26/2024

## 2024-11-27 LAB
ALBUMIN SERPL-MCNC: 3.7 G/DL (ref 3.5–5.2)
ALBUMIN/GLOB SERPL: 1.3 G/DL
ALP SERPL-CCNC: 85 U/L (ref 39–117)
ALT SERPL W P-5'-P-CCNC: 16 U/L (ref 1–41)
ANION GAP SERPL CALCULATED.3IONS-SCNC: 11.4 MMOL/L (ref 5–15)
AST SERPL-CCNC: 29 U/L (ref 1–40)
BASOPHILS # BLD AUTO: 0.02 10*3/MM3 (ref 0–0.2)
BASOPHILS NFR BLD AUTO: 0.3 % (ref 0–1.5)
BILIRUB SERPL-MCNC: 0.8 MG/DL (ref 0–1.2)
BUN SERPL-MCNC: 21 MG/DL (ref 8–23)
BUN/CREAT SERPL: 16.7 (ref 7–25)
CALCIUM SPEC-SCNC: 9.5 MG/DL (ref 8.2–9.6)
CHLORIDE SERPL-SCNC: 92 MMOL/L (ref 98–107)
CO2 SERPL-SCNC: 26.6 MMOL/L (ref 22–29)
CREAT SERPL-MCNC: 1.26 MG/DL (ref 0.76–1.27)
DEPRECATED RDW RBC AUTO: 46.6 FL (ref 37–54)
EGFRCR SERPLBLD CKD-EPI 2021: 52.2 ML/MIN/1.73
EOSINOPHIL # BLD AUTO: 0.03 10*3/MM3 (ref 0–0.4)
EOSINOPHIL NFR BLD AUTO: 0.5 % (ref 0.3–6.2)
ERYTHROCYTE [DISTWIDTH] IN BLOOD BY AUTOMATED COUNT: 14 % (ref 12.3–15.4)
GLOBULIN UR ELPH-MCNC: 2.8 GM/DL
GLUCOSE BLDC GLUCOMTR-MCNC: 155 MG/DL (ref 70–105)
GLUCOSE BLDC GLUCOMTR-MCNC: 169 MG/DL (ref 70–105)
GLUCOSE BLDC GLUCOMTR-MCNC: 187 MG/DL (ref 70–105)
GLUCOSE BLDC GLUCOMTR-MCNC: 211 MG/DL (ref 70–105)
GLUCOSE SERPL-MCNC: 173 MG/DL (ref 65–99)
HCT VFR BLD AUTO: 32.6 % (ref 37.5–51)
HGB BLD-MCNC: 10.8 G/DL (ref 13–17.7)
IMM GRANULOCYTES # BLD AUTO: 0.03 10*3/MM3 (ref 0–0.05)
IMM GRANULOCYTES NFR BLD AUTO: 0.5 % (ref 0–0.5)
LYMPHOCYTES # BLD AUTO: 1.44 10*3/MM3 (ref 0.7–3.1)
LYMPHOCYTES NFR BLD AUTO: 24.2 % (ref 19.6–45.3)
MAGNESIUM SERPL-MCNC: 1.5 MG/DL (ref 1.7–2.3)
MCH RBC QN AUTO: 30.3 PG (ref 26.6–33)
MCHC RBC AUTO-ENTMCNC: 33.1 G/DL (ref 31.5–35.7)
MCV RBC AUTO: 91.3 FL (ref 79–97)
MONOCYTES # BLD AUTO: 0.98 10*3/MM3 (ref 0.1–0.9)
MONOCYTES NFR BLD AUTO: 16.5 % (ref 5–12)
NEUTROPHILS NFR BLD AUTO: 3.44 10*3/MM3 (ref 1.7–7)
NEUTROPHILS NFR BLD AUTO: 58 % (ref 42.7–76)
NRBC BLD AUTO-RTO: 0 /100 WBC (ref 0–0.2)
PLATELET # BLD AUTO: 183 10*3/MM3 (ref 140–450)
PMV BLD AUTO: 7.9 FL (ref 6–12)
POTASSIUM SERPL-SCNC: 3.6 MMOL/L (ref 3.5–5.2)
PROT SERPL-MCNC: 6.5 G/DL (ref 6–8.5)
RBC # BLD AUTO: 3.57 10*6/MM3 (ref 4.14–5.8)
SODIUM SERPL-SCNC: 130 MMOL/L (ref 136–145)
WBC NRBC COR # BLD AUTO: 5.94 10*3/MM3 (ref 3.4–10.8)

## 2024-11-27 PROCEDURE — 82948 REAGENT STRIP/BLOOD GLUCOSE: CPT

## 2024-11-27 PROCEDURE — 82948 REAGENT STRIP/BLOOD GLUCOSE: CPT | Performed by: PHYSICIAN ASSISTANT

## 2024-11-27 PROCEDURE — 83735 ASSAY OF MAGNESIUM: CPT | Performed by: PHYSICIAN ASSISTANT

## 2024-11-27 PROCEDURE — 97166 OT EVAL MOD COMPLEX 45 MIN: CPT

## 2024-11-27 PROCEDURE — 25010000002 MAGNESIUM SULFATE IN D5W 1G/100ML (PREMIX) 1-5 GM/100ML-% SOLUTION

## 2024-11-27 PROCEDURE — 80053 COMPREHEN METABOLIC PANEL: CPT | Performed by: EMERGENCY MEDICINE

## 2024-11-27 PROCEDURE — 85025 COMPLETE CBC W/AUTO DIFF WBC: CPT | Performed by: EMERGENCY MEDICINE

## 2024-11-27 PROCEDURE — 97162 PT EVAL MOD COMPLEX 30 MIN: CPT

## 2024-11-27 PROCEDURE — 63710000001 INSULIN LISPRO (HUMAN) PER 5 UNITS: Performed by: PHYSICIAN ASSISTANT

## 2024-11-27 RX ORDER — IBUPROFEN 600 MG/1
1 TABLET ORAL
Status: DISCONTINUED | OUTPATIENT
Start: 2024-11-27 | End: 2024-12-10 | Stop reason: HOSPADM

## 2024-11-27 RX ORDER — INSULIN LISPRO 100 [IU]/ML
2-7 INJECTION, SOLUTION INTRAVENOUS; SUBCUTANEOUS
Status: DISCONTINUED | OUTPATIENT
Start: 2024-11-27 | End: 2024-12-10 | Stop reason: HOSPADM

## 2024-11-27 RX ORDER — FUROSEMIDE 20 MG/1
20 TABLET ORAL DAILY
Status: DISCONTINUED | OUTPATIENT
Start: 2024-11-27 | End: 2024-11-27

## 2024-11-27 RX ORDER — FUROSEMIDE 20 MG/1
20 TABLET ORAL EVERY OTHER DAY
Status: DISCONTINUED | OUTPATIENT
Start: 2024-11-29 | End: 2024-11-28

## 2024-11-27 RX ORDER — PROPRANOLOL HCL 20 MG
80 TABLET ORAL DAILY
Status: DISCONTINUED | OUTPATIENT
Start: 2024-11-27 | End: 2024-12-10 | Stop reason: HOSPADM

## 2024-11-27 RX ORDER — AMIODARONE HYDROCHLORIDE 200 MG/1
200 TABLET ORAL DAILY
Status: DISCONTINUED | OUTPATIENT
Start: 2024-11-27 | End: 2024-12-10 | Stop reason: HOSPADM

## 2024-11-27 RX ORDER — PIOGLITAZONE 30 MG/1
30 TABLET ORAL DAILY
Status: DISCONTINUED | OUTPATIENT
Start: 2024-11-27 | End: 2024-12-07

## 2024-11-27 RX ORDER — LOSARTAN POTASSIUM 50 MG/1
100 TABLET ORAL DAILY
Status: DISCONTINUED | OUTPATIENT
Start: 2024-11-27 | End: 2024-12-03

## 2024-11-27 RX ORDER — TAMSULOSIN HYDROCHLORIDE 0.4 MG/1
0.4 CAPSULE ORAL DAILY
Status: DISCONTINUED | OUTPATIENT
Start: 2024-11-27 | End: 2024-12-10 | Stop reason: HOSPADM

## 2024-11-27 RX ORDER — DEXTROSE MONOHYDRATE 25 G/50ML
25 INJECTION, SOLUTION INTRAVENOUS
Status: DISCONTINUED | OUTPATIENT
Start: 2024-11-27 | End: 2024-12-10 | Stop reason: HOSPADM

## 2024-11-27 RX ORDER — ATORVASTATIN CALCIUM 10 MG/1
10 TABLET, FILM COATED ORAL DAILY
Status: DISCONTINUED | OUTPATIENT
Start: 2024-11-27 | End: 2024-12-10 | Stop reason: HOSPADM

## 2024-11-27 RX ORDER — MAGNESIUM SULFATE 1 G/100ML
1 INJECTION INTRAVENOUS
Status: DISPENSED | OUTPATIENT
Start: 2024-11-27 | End: 2024-11-27

## 2024-11-27 RX ORDER — NICOTINE POLACRILEX 4 MG
15 LOZENGE BUCCAL
Status: DISCONTINUED | OUTPATIENT
Start: 2024-11-27 | End: 2024-12-10 | Stop reason: HOSPADM

## 2024-11-27 RX ADMIN — INSULIN LISPRO 2 UNITS: 100 INJECTION, SOLUTION INTRAVENOUS; SUBCUTANEOUS at 16:30

## 2024-11-27 RX ADMIN — INSULIN LISPRO 3 UNITS: 100 INJECTION, SOLUTION INTRAVENOUS; SUBCUTANEOUS at 11:53

## 2024-11-27 RX ADMIN — TAMSULOSIN HYDROCHLORIDE 0.4 MG: 0.4 CAPSULE ORAL at 09:14

## 2024-11-27 RX ADMIN — Medication 10 ML: at 20:00

## 2024-11-27 RX ADMIN — PROPRANOLOL HYDROCHLORIDE 80 MG: 20 TABLET ORAL at 09:14

## 2024-11-27 RX ADMIN — ATORVASTATIN CALCIUM 10 MG: 10 TABLET ORAL at 09:14

## 2024-11-27 RX ADMIN — AMIODARONE HYDROCHLORIDE 200 MG: 200 TABLET ORAL at 09:14

## 2024-11-27 RX ADMIN — INSULIN LISPRO 2 UNITS: 100 INJECTION, SOLUTION INTRAVENOUS; SUBCUTANEOUS at 09:13

## 2024-11-27 RX ADMIN — PIOGLITAZONE 30 MG: 30 TABLET ORAL at 09:14

## 2024-11-27 RX ADMIN — APIXABAN 5 MG: 5 TABLET, FILM COATED ORAL at 20:18

## 2024-11-27 RX ADMIN — MAGNESIUM SULFATE IN DEXTROSE 1 G: 10 INJECTION, SOLUTION INTRAVENOUS at 20:16

## 2024-11-27 RX ADMIN — INSULIN LISPRO 2 UNITS: 100 INJECTION, SOLUTION INTRAVENOUS; SUBCUTANEOUS at 20:17

## 2024-11-27 RX ADMIN — LOSARTAN POTASSIUM 100 MG: 50 TABLET, FILM COATED ORAL at 09:14

## 2024-11-27 RX ADMIN — APIXABAN 5 MG: 5 TABLET, FILM COATED ORAL at 09:14

## 2024-11-27 RX ADMIN — FUROSEMIDE 20 MG: 20 TABLET ORAL at 09:14

## 2024-11-27 RX ADMIN — MAGNESIUM SULFATE IN DEXTROSE 1 G: 10 INJECTION, SOLUTION INTRAVENOUS at 21:37

## 2024-11-27 NOTE — PLAN OF CARE
Problem: Adult Inpatient Plan of Care  Goal: Plan of Care Review  Outcome: Not Progressing  Flowsheets (Taken 11/26/2024 3105)  Progress: no change  Plan of Care Reviewed With: patient  Goal: Patient-Specific Goal (Individualized)  Outcome: Not Progressing  Goal: Absence of Hospital-Acquired Illness or Injury  Outcome: Not Progressing  Goal: Optimal Comfort and Wellbeing  Outcome: Not Progressing  Goal: Readiness for Transition of Care  Outcome: Not Progressing     Problem: Skin Injury Risk Increased  Goal: Skin Health and Integrity  Outcome: Not Progressing     Problem: Fall Injury Risk  Goal: Absence of Fall and Fall-Related Injury  Outcome: Not Progressing   Goal Outcome Evaluation:  Plan of Care Reviewed With: patient        Progress: no change

## 2024-11-27 NOTE — PLAN OF CARE
Goal Outcome Evaluation:  Plan of Care Reviewed With: patient, spouse, family   Alert and disoriented to time and situation (per family baseline at home A/O x4, family states they believe reason for confusion is environmental). Pleasant and cooperative with care. Does attempt OOB without assistance alarms on and in working order and throughout day today family remained at bedside. FREDI DIAZ with bilat hearing aides at bedside. Generalized tremors (chronic) Plan is for rehab placement     Progress: improving

## 2024-11-27 NOTE — NURSING NOTE
Patient pulled out IV and will not let RN put in a new site d/t confusion at this time, advised CN as well. Will see if oncoming shift can try to replace when pt more oriented.

## 2024-11-27 NOTE — DISCHARGE PLACEMENT REQUEST
"Jyothi Luh ZENOBIA (89 y.o. Male)       Date of Birth   01/15/1928    Social Security Number       Address   [address] [place] [facility]    Home Phone   [phone]    MRN   [id number]       Buddhism   Holiness    Marital Status                               Admission Date   11/26/24    Admission Type   Emergency    Admitting Provider   Chuy Rick MD    Attending Provider   Chuy Rick MD    Department, Room/Bed   [facility] OBSERVATION, 114/1       Discharge Date       Discharge Disposition       Discharge Destination                                 Attending Provider: Chuy Rick MD    Allergies: Celebrex [Celecoxib]    Isolation: None   Infection: None   Code Status: CPR    Ht: 172.7 cm (68\")   Wt: 82.5 kg (181 lb 14.1 oz)    Admission Cmt: None   Principal Problem: Weakness [R53.1]                   Active Insurance as of 11/26/2024       Primary Coverage       Payor Plan Insurance Group Employer/Plan Group    HU[employer]NA MEDICARE REPLACEMENT [employer] MED ADV GROUP [id number]       Payor Plan Address Payor Plan Phone Number Payor Plan Fax Number Effective Dates    [address] [address] [phone]  1/1/2018 - None Entered    [facility] [address]         Subscriber Name Subscriber Birth Date Member ID       LUH MUNSON 1/15/1928 [id number]                     Emergency Contacts        (Rel.) Home Phone Work Phone Mobile Phone    KELLYMICHAEL (Daughter) -- -- [phone]    KAYLA MUNSONH (Spouse) [phone] -- --    Joyce Marte (Daughter) [phone] -- --                "

## 2024-11-27 NOTE — THERAPY EVALUATION
Patient Name: Ap Roe  : 1/15/1928    MRN: [id number]                              Today's Date: 2024       Admit Date: 2024    Visit Dx:     ICD-10-CM ICD-9-CM   1. Weakness  R53.1 780.79   2. Fall, initial encounter  W19.XXXA E888.9   3. Abrasion of knee, bilateral  S80.211A 916.0    S80.212A    4. Acute pain of both knees  M25.561 338.19    M25.562 719.46   5. Left hip pain  M25.552 719.45     Patient Active Problem List   Diagnosis    Weakness     Past Medical History:   Diagnosis Date    Arthritis     Diabetes 1.5, managed as type 2     Heart disease      Past Surgical History:   Procedure Laterality Date    CHOLECYSTECTOMY      HAND SURGERY      MELANOMA WIDE LOCAL EXCISION Right     cheek    PACEMAKER IMPLANTATION      SKIN GRAFT      scalp      General Information       Row Name 24 1130          Physical Therapy Time and Intention    Document Type evaluation  -OD     Mode of Treatment physical therapy  -OD       Row Name 24 1130          General Information    Patient Profile Reviewed yes  -OD     Prior Level of Function independent:;ADL's;driving;all household mobility  -OD     Existing Precautions/Restrictions fall  -OD     Barriers to Rehab hearing deficit;cognitive status  -OD       Row Name 24 1130          Living Environment    People in Home spouse  -OD       Row Name 24 1130          Home Main Entrance    Number of Stairs, Main Entrance two  -OD       Row Name 24 1130          Stairs Within Home, Primary    Number of Stairs, Within Home, Primary none  -OD       Row Name 24 1130          Cognition    Orientation Status (Cognition) oriented to;person;disoriented to;place;situation;time  -OD       Row Name 24 1130          Safety Issues/Impairments Affecting Functional Mobility    Safety Issues Affecting Function (Mobility) judgment;problem-solving;sequencing abilities  -OD     Impairments Affecting Function (Mobility)  balance;cognition;endurance/activity tolerance;motor planning;strength  -OD     Cognitive Impairments, Mobility Safety/Performance insight into deficits/self-awareness;judgment;problem-solving/reasoning;sequencing abilities  -OD               User Key  (r) = Recorded By, (t) = Taken By, (c) = Cosigned By      Initials Name Provider Type    OD Brandy Vallecillo PT Physical Therapist                   Mobility       Row Name 11/27/24 1133          Bed Mobility    Bed Mobility bed mobility (all) activities  -OD     All Activities, [place] (Bed Mobility) minimum assist (75% patient effort)  -OD       Row Name 11/27/24 1133          Bed-Chair Transfer    Bed-Chair [place] (Transfers) moderate assist (50% patient effort);2 person assist  -OD       Row Name 11/27/24 1133          Sit-Stand Transfer    Sit-Stand [place] (Transfers) minimum assist (75% patient effort);2 person assist  -OD       Row Name 11/27/24 1133          Gait/Stairs (Locomotion)    [place] Level (Gait) moderate assist (50% patient effort);2 person assist  -OD     Patient was able to Ambulate yes  -OD     Distance in Feet (Gait) 2  only safe for steps during transfers  -OD     Deviations/Abnormal Patterns (Gait) festinating/shuffling  -OD               User Key  (r) = Recorded By, (t) = Taken By, (c) = Cosigned By      Initials Name Provider Type    Brandy Macedo PT Physical Therapist                   Obj/Interventions       Row Name 11/27/24 1134          Range of Motion Comprehensive    General Range of Motion bilateral lower extremity ROM WFL  -OD       Row Name 11/27/24 1134          Strength Comprehensive (MMT)    General Manual Muscle Testing (MMT) Assessment lower extremity strength deficits identified  -OD     Comment, General Manual Muscle Testing (MMT) Assessment BLE grossly weak 3/5. Observed poor motor control and motor planning. Could be r/t [place]  -OD       Row Name 11/27/24 1134          Balance    Balance Assessment  standing static balance;standing dynamic balance  -OD     Static Standing Balance moderate assist;2-person assist  -OD     Dynamic Standing Balance moderate assist;2-person assist  -OD     Balance Interventions sitting;minimal challenge;standing;moderate challenge  -OD       Row Name 11/27/24 1134          Sensory Assessment (Somatosensory)    Sensory Assessment (Somatosensory) sensation intact  -OD               User Key  (r) = Recorded By, (t) = Taken By, (c) = Cosigned By      Initials Name Provider Type    OD Brandy Vallecillo, PT Physical Therapist                   Goals/Plan       Row Name 11/27/24 1152          Bed Mobility Goal 1 (PT)    Activity/Assistive Device (Bed Mobility Goal 1, PT) bed mobility activities, all  -OD     [place] Level/Cues Needed (Bed Mobility Goal 1, PT) independent  -OD     Time Frame (Bed Mobility Goal 1, PT) long term goal (LTG);2 weeks  -OD       Row Name 11/27/24 1152          Transfer Goal 1 (PT)    Activity/Assistive Device (Transfer Goal 1, PT) transfers, all  -OD     [place] Level/Cues Needed (Transfer Goal 1, PT) independent  -OD     Time Frame (Transfer Goal 1, PT) long term goal (LTG);2 weeks  -OD       Row Name 11/27/24 1152          Gait Training Goal 1 (PT)    Activity/Assistive Device (Gait Training Goal 1, PT) gait (walking locomotion);walker, rolling  -OD     [place] Level (Gait Training Goal 1, PT) modified independence  -OD     Distance (Gait Training Goal 1, PT) 40'  -OD     Time Frame (Gait Training Goal 1, PT) long term goal (LTG);2 weeks  -OD       Row Name 11/27/24 1152          Stairs Goal 1 (PT)    Activity/Assistive Device (Stairs Goal 1, PT) stairs, all skills  -OD     [place] Level/Cues Needed (Stairs Goal 1, PT) independent  -OD     Number of Stairs (Stairs Goal 1, PT) 2  -OD     Time Frame (Stairs Goal 1, PT) long term goal (LTG);2 weeks  -OD       Row Name 11/27/24 1152          Therapy Assessment/Plan (PT)    Planned Therapy  Interventions (PT) balance training;bed mobility training;gait training;home exercise program;neuromuscular re-education;ROM (range of motion);stair training;strengthening;stretching;patient/family education;transfer training;postural re-education  -OD               User Key  (r) = Recorded By, (t) = Taken By, (c) = Cosigned By      Initials Name Provider Type    OD Brandy Vallecillo, PT Physical Therapist                   Clinical Impression       Row Name 11/27/24 1138          Pain    Pretreatment Pain Rating 0/10 - no pain  -OD     Posttreatment Pain Rating 0/10 - no pain  -OD       Row Name 11/27/24 1138          Plan of Care Review    Plan of Care Reviewed With patient;spouse;child  -OD     Progress no change  -OD     Outcome Evaluation Ap Roe is a 89 y/o M who was admitted to [facility] on 11/26/24 after falling on his knees at home. Imaging hip and knee (-). Pt is AAOx1 and [place] this date, able to report living in a SSH with spouse and is IND with ADLs and mobility using cane. However pt is disoriented to situation, does not remember falling. Spouse present to help obtain PLOF. Spouse reports they live in a SSH with 2 ISMAEL. Pt is current with Outpatient PT for weakness and balance. This date, pt demonstrates global BLE weakness, impaired sequencing and motor planning, and observed resting bilat hand tremor. Pt requires modAx2 and short, specific commands to achieve transferring bed>BSC and BSC>recliner. Pt appears far below baseline function and would benefit from IP rehab upon d/c to facilitate return to PLOF. PT will follow while admitted.  -OD       Row Name 11/27/24 113          Therapy Assessment/Plan (PT)    Rehab Potential (PT) good  -OD     Criteria for Skilled Interventions Met (PT) yes;meets criteria  -OD     Therapy Frequency (PT) 5 times/wk  -OD     Predicted Duration of Therapy Intervention (PT) until d/c  -OD       Row Name 11/27/24 113          Vital Signs    O2 Delivery Pre Treatment room air   -OD     O2 Delivery Intra Treatment room air  -OD     O2 Delivery Post Treatment room air  -OD     Pre Patient Position Supine  -OD     Intra Patient Position Standing  -OD     Post Patient Position Sitting  -OD       Row Name 11/27/24 1138          Positioning and Restraints    Pre-Treatment Position in bed  -OD     Post Treatment Position chair  -OD     In Chair notified nsg;reclined;call light within reach;encouraged to call for assist;exit alarm on;with family/caregiver  -OD               User Key  (r) = Recorded By, (t) = Taken By, (c) = Cosigned By      Initials Name Provider Type    Brandy Macedo, PT Physical Therapist                   Outcome Measures       Row Name 11/27/24 1153 11/27/24 0820       How much help from another person do you currently need...    Turning from your back to your side while in flat bed without using bedrails? 3  -OD 4  -RR    Moving from lying on back to sitting on the side of a flat bed without bedrails? 3  -OD 4  -RR    Moving to and from a bed to a chair (including a wheelchair)? 2  -OD 3  -RR    Standing up from a chair using your arms (e.g., wheelchair, bedside chair)? 2  -OD 2  -RR    Climbing 3-5 steps with a railing? 1  -OD 2  -RR    To walk in hospital room? 2  -OD 2  -RR    AM-PAC 6 Clicks Score (PT) 13  -OD 17  -RR    Highest Level of Mobility Goal 4 --> Transfer to chair/commode  -OD 5 --> Static standing  -RR      Row Name 11/27/24 1153          Functional Assessment    Outcome Measure Options AM-PAC 6 Clicks Basic Mobility (PT)  -OD               User Key  (r) = Recorded By, (t) = Taken By, (c) = Cosigned By      Initials Name Provider Type    Brandy Macedo, PT Physical Therapist    RR Vonnie Bowen RN Registered Nurse                                 Physical Therapy Education       Title: PT OT SLP Therapies (Done)       Topic: Physical Therapy (Done)       Point: Mobility training (Done)       Learning Progress Summary            Patient Acceptance E VU  by OD at 11/27/2024 1153                      Point: Home exercise program (Done)       Learning Progress Summary            Patient Acceptance, E, VU by OD at 11/27/2024 1153                      Point: Body mechanics (Done)       Learning Progress Summary            Patient Acceptance, E, VU by OD at 11/27/2024 1153                      Point: Precautions (Done)       Learning Progress Summary            Patient Acceptance, E, VU by OD at 11/27/2024 1153                                      User Key       Initials Effective Dates Name Provider Type Discipline    OD 05/11/23 -  Brandy Vallecillo, PT Physical Therapist PT                  PT Recommendation and Plan  Planned Therapy Interventions (PT): balance training, bed mobility training, gait training, home exercise program, neuromuscular re-education, ROM (range of motion), stair training, strengthening, stretching, patient/family education, transfer training, postural re-education  Progress: no change  Outcome Evaluation: Ap Roe is a 89 y/o M who was admitted to [facility] on 11/26/24 after falling on his knees at home. Imaging hip and knee (-). Pt is AAOx1 and [place] this date, able to report living in a SSH with spouse and is IND with ADLs and mobility using cane. However pt is disoriented to situation, does not remember falling. Spouse present to help obtain PLOF. Spouse reports they live in a SSH with 2 ISMAEL. Pt is current with Outpatient PT for weakness and balance. This date, pt demonstrates global BLE weakness, impaired sequencing and motor planning, and observed resting bilat hand tremor. Pt requires modAx2 and short, specific commands to achieve transferring bed>BSC and BSC>recliner. Pt appears far below baseline function and would benefit from IP rehab upon d/c to facilitate return to PLOF. PT will follow while admitted.     Time Calculation:         PT Charges       Row Name 11/27/24 1154             Time Calculation    Start Time 0807  -OD      Stop Time  0831  -OD      Time Calculation (min) 24 min  -OD      PT Received On 11/27/24  -OD      PT - Next Appointment 11/29/24  -OD      PT Goal Re-Cert Due Date 12/11/24  -OD         Time Calculation- PT    Total Timed Code Minutes- PT 0 minute(s)  -OD                User Key  (r) = Recorded By, (t) = Taken By, (c) = Cosigned By      Initials Name Provider Type    OD Brandy Vallecillo, PT Physical Therapist                  Therapy Charges for Today       Code Description Service Date Service Provider Modifiers Qty    [id number] HC PT EVAL MOD COMPLEXITY 4 11/27/2024 Brandy Vallecillo, PT GP 1            PT G-Codes  Outcome Measure Options: AM-PAC 6 Clicks Basic Mobility (PT)  AM-PAC 6 Clicks Score (PT): 13  PT Discharge Summary  Anticipated Discharge Disposition (PT): inpatient rehabilitation facility    Brandy Vallecillo PT  11/27/2024

## 2024-11-27 NOTE — H&P
[facility] Medicine Services  History & Physical    Patient Name: Ap Roe  : 1/15/1928  MRN: [id number]  Primary Care Physician:  Elva Mota MD  Date of admission: 2024  Date and Time of Service: 2024 at 1709    Subjective      Chief Complaint: fall    History of Present Illness: Ap Roe is a 89 y.o. male with a CMH of diabetes, coronary artery disease who presented to [facility] on 2024 with a fall that occurred at home.  He said he was walking up the steps in his garage and fell landing on his knees.  He is also complaining of left hip pain. He did not hit his head.  On ED evaluation no fractures were identified. Patient was originally admitted under observation status.  PT and OT have evaluated this patient and they are recommending inpatient rehab. Hospitalist team to admit for further medical management.      Review of Systems   Constitutional:  Positive for fatigue.   Musculoskeletal:  Positive for arthralgias and gait problem.   Neurological:  Positive for weakness.       Personal History     Past Medical History:   Diagnosis Date    Arthritis     Diabetes 1.5, managed as type 2     Heart disease        Past Surgical History:   Procedure Laterality Date    CHOLECYSTECTOMY      HAND SURGERY      MELANOMA WIDE LOCAL EXCISION Right     cheek    PACEMAKER IMPLANTATION      SKIN GRAFT      scalp       Family History: family history is not on file. Otherwise pertinent FHx was reviewed and not pertinent to current issue.    Social History:  reports that he has never smoked. He has never used smokeless tobacco. He reports that he does not drink alcohol and does not use drugs.    Home Medications:  Prior to Admission Medications       Prescriptions Last Dose Informant Patient Reported? Taking?    amiodarone (PACERONE) 200 MG tablet 2024  Yes Yes    Take 1 tablet by mouth Daily.    Eliquis 5 MG tablet tablet 2024  Yes Yes    Take 1 tablet by mouth  Every 12 (Twelve) Hours.    furosemide (LASIX) 20 MG tablet 11/26/2024  Yes Yes    Take 1 tablet by mouth Daily.    losartan (COZAAR) 100 MG tablet 11/26/2024  Yes Yes    Take 1 tablet by mouth Daily.    metFORMIN (GLUCOPHAGE) 1000 MG tablet 11/26/2024  Yes Yes    Take 1 tablet by mouth 2 (Two) Times a Day With Meals.    pioglitazone (ACTOS) 30 MG tablet 11/26/2024  Yes Yes    Take 1 tablet by mouth Daily.    propranolol (INDERAL) 80 MG tablet 11/26/2024  Yes Yes    Take 1 tablet by mouth Daily.    simvastatin (ZOCOR) 20 MG tablet 11/26/2024  Yes Yes    Take 1 tablet by mouth Daily.    tamsulosin (FLOMAX) 0.4 MG capsule 24 hr capsule 11/26/2024  Yes Yes    Take 1 capsule by mouth Daily.              Allergies:  Allergies   Allergen Reactions    Celebrex [Celecoxib] Hives       Objective      Vitals:   Temp:  [97.5 °F (36.4 °C)-97.8 °F (36.6 °C)] 97.8 °F (36.6 °C)  Heart Rate:  [60-61] 60  Resp:  [17-18] 17  BP: (120-166)/(65-99) 166/81  Body mass index is 27.65 kg/m².  Physical Exam  Vitals and nursing note reviewed.   Constitutional:       Appearance: Normal appearance.   HENT:      Head: Normocephalic and atraumatic.      Nose: Nose normal.      Mouth/Throat:      Mouth: Mucous membranes are moist.   Eyes:      Extraocular Movements: Extraocular movements intact.      Pupils: Pupils are equal, round, and reactive to light.   Cardiovascular:      Rate and Rhythm: Normal rate and regular rhythm.      Pulses: Normal pulses.   Pulmonary:      Effort: Pulmonary effort is normal.      Breath sounds: Normal breath sounds.   Abdominal:      General: Bowel sounds are normal.      Palpations: Abdomen is soft.   Musculoskeletal:         General: Normal range of motion.      Cervical back: Normal range of motion and neck supple.      Right lower leg: Edema present.      Left lower leg: Edema present.   Skin:     General: Skin is warm.      Capillary Refill: Capillary refill takes less than 2 seconds.      Findings: Lesion  (left knee) present.   Neurological:      Mental Status: He is alert and oriented to person, place, and time.         Diagnostic Data:  Lab Results (last 24 hours)       Procedure Component Value Units Date/Time    POC Glucose Once [[id number]]  (Abnormal) Collected: 11/27/24 1627    Specimen: Blood Updated: 11/27/24 1628     Glucose 169 mg/dL      Comment: Serial Number: [id number]:  [id number]       POC Glucose 4x Daily Before Meals & at Bedtime [[id number]]  (Abnormal) Collected: 11/27/24 1142    Specimen: Blood Updated: 11/27/24 1144     Glucose 211 mg/dL      Comment: Serial Number: [id number]:  [id number]       POC Glucose 4x Daily Before Meals & at Bedtime [[id number]]  (Abnormal) Collected: 11/27/24 0733    Specimen: Blood Updated: 11/27/24 0735     Glucose 187 mg/dL      Comment: Serial Number: [id number]:  [id number]       Comprehensive Metabolic Panel [[id number]]  (Abnormal) Collected: 11/27/24 0336    Specimen: Blood from Arm, Right Updated: 11/27/24 0417     Glucose 173 mg/dL      BUN 21 mg/dL      Creatinine 1.26 mg/dL      Sodium 130 mmol/L      Potassium 3.6 mmol/L      Chloride 92 mmol/L      CO2 26.6 mmol/L      Calcium 9.5 mg/dL      Total Protein 6.5 g/dL      Albumin 3.7 g/dL      ALT (SGPT) 16 U/L      AST (SGOT) 29 U/L      Alkaline Phosphatase 85 U/L      Total Bilirubin 0.8 mg/dL      Globulin 2.8 gm/dL      A/G Ratio 1.3 g/dL      BUN/Creatinine Ratio 16.7     Anion Gap 11.4 mmol/L      eGFR 52.2 mL/min/1.73     Narrative:      GFR Normal >60  Chronic Kidney Disease <60  Kidney Failure <15    The GFR formula is only valid for adults with stable renal function between ages 18 and 70.    CBC & Differential [[id number]]  (Abnormal) Collected: 11/27/24 0336    Specimen: Blood from Arm, Right Updated: 11/27/24 0356    Narrative:      The following orders were created for panel order CBC & Differential.  Procedure                               Abnormality         Status                      ---------                               -----------         ------                     CBC Auto Differential[[id number]]        Abnormal            Final result                 Please view results for these tests on the individual orders.    CBC Auto Differential [[id number]]  (Abnormal) Collected: 11/27/24 0336    Specimen: Blood from Arm, Right Updated: 11/27/24 0356     WBC 5.94 10*3/mm3      RBC 3.57 10*6/mm3      Hemoglobin 10.8 g/dL      Hematocrit 32.6 %      MCV 91.3 fL      MCH 30.3 pg      MCHC 33.1 g/dL      RDW 14.0 %      RDW-SD 46.6 fl      MPV 7.9 fL      Platelets 183 10*3/mm3      Neutrophil % 58.0 %      Lymphocyte % 24.2 %      Monocyte % 16.5 %      Eosinophil % 0.5 %      Basophil % 0.3 %      Immature Grans % 0.5 %      Neutrophils, Absolute 3.44 10*3/mm3      Lymphocytes, Absolute 1.44 10*3/mm3      Monocytes, Absolute 0.98 10*3/mm3      Eosinophils, Absolute 0.03 10*3/mm3      Basophils, Absolute 0.02 10*3/mm3      Immature Grans, Absolute 0.03 10*3/mm3      nRBC 0.0 /100 WBC     Comprehensive Metabolic Panel [[id number]]  (Abnormal) Collected: 11/26/24 1732    Specimen: Blood Updated: 11/26/24 1824     Glucose 148 mg/dL      BUN 23 mg/dL      Creatinine 1.27 mg/dL      Sodium 132 mmol/L      Potassium 4.5 mmol/L      Chloride 93 mmol/L      CO2 24.7 mmol/L      Calcium 9.6 mg/dL      Total Protein 6.9 g/dL      Albumin 4.0 g/dL      ALT (SGPT) 16 U/L      AST (SGOT) 29 U/L      Alkaline Phosphatase 101 U/L      Total Bilirubin 0.8 mg/dL      Globulin 2.9 gm/dL      A/G Ratio 1.4 g/dL      BUN/Creatinine Ratio 18.1     Anion Gap 14.3 mmol/L      eGFR 51.7 mL/min/1.73     Narrative:      GFR Normal >60  Chronic Kidney Disease <60  Kidney Failure <15    The GFR formula is only valid for adults with stable renal function between ages 18 and 70.    Magnesium [[id number]]  (Abnormal) Collected: 11/26/24 1732    Specimen: Blood Updated: 11/26/24 1824     Magnesium 1.3 mg/dL     CBC &  Differential [[id number]]  (Abnormal) Collected: 11/26/24 1732    Specimen: Blood Updated: 11/26/24 1743    Narrative:      The following orders were created for panel order CBC & Differential.  Procedure                               Abnormality         Status                     ---------                               -----------         ------                     CBC Auto Differential[[id number]]        Abnormal            Final result                 Please view results for these tests on the individual orders.    CBC Auto Differential [[id number]]  (Abnormal) Collected: 11/26/24 1732    Specimen: Blood Updated: 11/26/24 1743     WBC 6.39 10*3/mm3      RBC 3.65 10*6/mm3      Hemoglobin 11.3 g/dL      Hematocrit 33.8 %      MCV 92.6 fL      MCH 31.0 pg      MCHC 33.4 g/dL      RDW 14.2 %      RDW-SD 47.9 fl      MPV 7.7 fL      Platelets 190 10*3/mm3      Neutrophil % 68.2 %      Lymphocyte % 20.3 %      Monocyte % 10.6 %      Eosinophil % 0.2 %      Basophil % 0.2 %      Immature Grans % 0.5 %      Neutrophils, Absolute 4.36 10*3/mm3      Lymphocytes, Absolute 1.30 10*3/mm3      Monocytes, Absolute 0.68 10*3/mm3      Eosinophils, Absolute 0.01 10*3/mm3      Basophils, Absolute 0.01 10*3/mm3      Immature Grans, Absolute 0.03 10*3/mm3      nRBC 0.0 /100 WBC     Urinalysis With Microscopic If Indicated (No Culture) - Urine, Clean Catch [[id number]]  (Normal) Collected: 11/26/24 1726    Specimen: Urine, Clean Catch Updated: 11/26/24 1736     Color, UA Yellow     Appearance, UA Clear     pH, UA 5.5     Specific Gravity, UA 1.007     Glucose, UA Negative     Ketones, UA Negative     Bilirubin, UA Negative     Blood, UA Negative     Protein, UA Negative     Leuk Esterase, UA Negative     Nitrite, UA Negative     Urobilinogen, UA 1.0 E.U./dL    Narrative:      Urine microscopic not indicated.             Imaging Results (Last 24 Hours)       Procedure Component Value Units Date/Time    XR Hip With or Without Pelvis 2 - 3  View Left [[id number]] Collected: 11/26/24 1748     Updated: 11/26/24 1752    Narrative:      XR HIP W OR WO PELVIS 2-3 VIEW LEFT    Date of Exam: 11/26/2024 5:09 PM EST    Indication: hip pain after fall    Comparison: None available.    Findings:  The proximal left femur is intact. The iliopectineal and ilioischial lines are intact bilaterally. Mild bilateral hip osteoarthritis. Multilevel disc narrowing in the visualized lower lumbar spine. Vascular calcifications noted.      Impression:      Impression:  Negative for acute osseous abnormality.        Electronically Signed: Damian Keen MD    11/26/2024 5:50 PM EST    Workstation ID: [id number]              Assessment & Plan        This is a 89 y.o. male with:    Active and Resolved Problems  Active Hospital Problems    Diagnosis  POA    **Weakness [R53.1]  Yes      Resolved Hospital Problems   No resolved problems to display.       Fall   Weakness  - X-ray of knee showed degenerative changes with no fractures and soft tissues swelling on the left  - X-ray of hip showed no acute abnormality  - fall precautions  - PT/OT consulted recommending inpatient rehab  - Pre-cert for [facility] rehab pending     Hypomagnesemia  - Magnesium: 1.3 on admit   - Mag sulfate given in the ED  - Repeat lab pending     Hyponatremia   - appears chronic   - Sodium: 132, 130 at baseline   -Monitor while admitted     Diabetes mellitus  -   - Hold metformin  - Continue Actos  - SSI ordered  - Diabetic diet     Anemia   - Hgb 10.8  - No overt s/sx of bleeding   - Continue to monitor CBC  - Transfuse if Hgb < 7    Atrial fibrillation  -Continue amiodarone, propranolol and Eliquis     Hypertension  - /81  - continue losartan    VTE Prophylaxis:  Pharmacologic & mechanical VTE prophylaxis orders are present.        The patient desires to be as follows:    CODE STATUS:    Code Status (Patient has no pulse and is not breathing): CPR (Attempt to Resuscitate)  Medical Interventions (Patient  has pulse or is breathing): Full Support        Giuliana Jyothi, who can be contacted at [phone], is the designated person to make medical decisions on the patient's behalf if He is incapable of doing so. This was clarified with patient and/or next of kin on 11/26/2024 during the course of this H&P.    Admission Status:  I believe this patient meets inpatient status.    Expected Length of Stay: 1-2 days    PDMP and Medication Dispenses via Sidebar reviewed and consistent with patient reported medications.    I discussed the patient's findings and my recommendations with patient.      Signature:     This document has been electronically signed by DOMONIQUE Hernandez on November 27, 2024 17:09 [facility] Hospitalist Team

## 2024-11-27 NOTE — CASE MANAGEMENT/SOCIAL WORK
Discharge Planning Assessment   Royce     Patient Name: Ap Roe  MRN: [id number]  Today's Date: 11/27/2024    Admit Date: 11/26/2024    Plan: From home. Referral to [facility] (pending) No PASRR for IRF, pre-cert required.   Discharge Needs Assessment       Row Name 11/27/24 1105       Living Environment    People in Home spouse    Name(s) of People in Home wife Giuliana    Current Living Arrangements home    Potentially Unsafe Housing Conditions none    In the past 12 months has the electric, gas, oil, or water company threatened to shut off services in your home? No    Primary Care Provided by self    Provides Primary Care For no one    Family Caregiver if Needed spouse;child(jw), adult    Family Caregiver Names Giuliana, daughter Joyce    Quality of Family Relationships helpful;involved;supportive    Able to Return to Prior Arrangements yes       Resource/Environmental Concerns    Resource/Environmental Concerns none    Transportation Concerns none       Transportation Needs    In the past 12 months, has lack of transportation kept you from medical appointments or from getting medications? no    In the past 12 months, has lack of transportation kept you from meetings, work, or from getting things needed for daily living? No       Food Insecurity    Within the past 12 months, you worried that your food would run out before you got the money to buy more. Never true    Within the past 12 months, the food you bought just didn't last and you didn't have money to get more. Never true       Transition Planning    Patient/Family Anticipates Transition to inpatient rehabilitation facility    Patient/Family Anticipated Services at Transition none    Transportation Anticipated car, drives self;family or friend will provide       Discharge Needs Assessment    Readmission Within the Last 30 Days no previous admission in last 30 days    Equipment Currently Used at Home cane, straight;glucometer;rollator;walker, rolling;walker  standard    Concerns to be Addressed discharge planning    Anticipated Changes Related to Illness none    Equipment Needed After Discharge none    Discharge Facility/Level of Care Needs rehabilitation facility    Provided Post Acute Provider List? N/A    N/A Provider List Comment patient and family already have IRF choice of [facility] (not taking [employer] M[facility], but happy with [facility] referral) or [facility] for SNF    Patient's Choice of Community Agency(s) patient and family already have IRF choice of [facility] (not taking [employer] [facility], but happy with [facility] referral) or [facility] for SNF                   Discharge Plan       Row Name 11/27/24 1110       Plan    Plan From home. Referral to [facility] (pending) No PASRR for IRF, pre-cert required.    Plan Comments CM met with patient at the bedside. Confirmed PCP, insurance, and pharmacy. M2B n/a as patient will be discharging to rehab. Patient denies any difficulty affording medications. Patient is current with [facility] rehab OPPT services in [place]. Patient lives at home with his wife, is independed with ADLS/IADLS, and drives. Family is requesting transportation assistance as patient is more weak and do not feel safe to transport. CM obtained IRF choices of [facility] and SNF choice is [facility]. PT sent secure chat to this CM and stated they were recommending IRF at this time. CM placed referral to [facility] and messaged carin Baron. Will need pre-cert.                  Continued Care and Services - Admitted Since 11/26/2024       Destination       Service Provider Request Status Services Address Phone Fax Patient Preferred    [facility] Pending - Request Sent -- [address] [place] [address] [phone] --                  Expected Discharge Date and Time       Expected Discharge Date Expected Discharge Time    Nov 29, 2024            Demographic Summary       Row Name 11/27/24 1102       General Information    Admission Type observation     Arrived From emergency department    Required Notices Provided Observation Status Notice    Referral Source admission list    Reason for Consult discharge planning    Preferred Language English       Contact Information    Permission Granted to Share Info With     Contact Information Obtained for                    Functional Status       Row Name 11/27/24 1105       Functional Status    Usual Activity Tolerance moderate    Current Activity Tolerance moderate       Functional Status, IADL    Medications independent    Meal Preparation independent    Housekeeping independent    Laundry independent    Shopping independent             Dave Rivera RN      Cell number [phone]  Office number [phone]

## 2024-11-27 NOTE — DISCHARGE PLACEMENT REQUEST
"RoeLuh ZENOBIA (89 y.o. Male)       Date of Birth   01/15/1928    Social Security Number       Address   [address] [place] [address]    Home Phone   [phone]    MRN   [id number]       Jew   Islam    Marital Status                               Admission Date   11/26/24    Admission Type   Emergency    Admitting Provider   Shaji Loredo MD    Attending Provider   Shaji Loredo MD    Department, Room/Bed   [facility] OBSERVATION, 114/1       Discharge Date       Discharge Disposition       Discharge Destination                                 Attending Provider: Shaji Loredo MD    Allergies: Celebrex [Celecoxib]    Isolation: None   Infection: None   Code Status: CPR    Ht: 172.7 cm (68\")   Wt: 82.5 kg (181 lb 14.1 oz)    Admission Cmt: None   Principal Problem: Weakness [R53.1]                   Active Insurance as of 11/26/2024       Primary Coverage       Payor Plan Insurance Group Employer/Plan Group    [employer] MEDICARE REPLACEMENT [employer] MED ADV GROUP [id number]       Payor Plan Address Payor Plan Phone Number Payor Plan Fax Number Effective Dates    [address] [address] [phone]  1/1/2018 - None Entered    [facility] [id number]         Subscriber Name Subscriber Birth Date Member ID       LUH ROE 1/15/1928 [id number]                     Emergency Contacts        (Rel.) Home Phone Work Phone Mobile Phone    MICHAEL MENDOZA (Daughter) -- -- [phone]    RILEY ROE (Spouse) [phone] -- --    Joyce Marte (Daughter) [phone] -- --                "

## 2024-11-27 NOTE — CASE MANAGEMENT/SOCIAL WORK
Continued Stay Note  HAJA Crane     Patient Name: Ap Roe  MRN: [id number]  Today's Date: 11/27/2024    Admit Date: 11/26/2024    Plan: [facility] (accepted). No PASRR re-quired. Pre-cert per [facility] (pending)   Discharge Plan       Row Name 11/27/24 1556       Plan    Plan [facility] (accepted). No PASRR re-quired. Pre-cert per [facility] (pending)    Plan Comments CM notified liaison Loraine that PT/OT notes are in patients chart for pre-cert sumbission. Per CLAIRE Soto, facility to start precert 11/27.           Dave Rivera RN     Cell number [phone]  Office number [phone]

## 2024-11-27 NOTE — H&P
"Formerly [facility] Observation Unit H&P    Patient Name: Ap Roe  : 1/15/1928  MRN: [id number]  Primary Care Physician: Elva Mota MD  Date of admission: 2024     Patient Care Team:  Elva Mota MD as PCP - General (Internal Medicine)          Subjective   History Present Illness     Chief Complaint:   Chief Complaint   Patient presents with    Fall         History of Present Illness  Obtained from ED provider HPI on 2024:  Patient is a 89-year-old male presented via EMS from home after mechanical fall patient states he tripped going up the steps in his garage landing on his knees.  He has some abrasions noted on his knees bilaterally does report some pain in this region.  He denies pain elsewhere.  He denies any head injury or loss of consciousness.  He denies any lightheadedness or dizziness to cause the fall.  Denies any numbness or weakness of his legs.     24:  Patient and family with him confirms the HPI noted above reporting a fall the previous day without head trauma or loss of consciousness.  Patient reports that he \"missed a step\" but family do note that he has been having some increased fatigue.  He denies any pain, dyspnea or other symptoms at the time of my exam.        ROS  Review of Systems   Constitutional: Negative.   HENT: Negative.     Eyes: Negative.    Cardiovascular: Negative.    Respiratory: Negative.     Skin: Negative.    Musculoskeletal:  Positive for falls and joint pain.   Gastrointestinal: Negative.    Genitourinary: Negative.    Neurological: Negative.    Psychiatric/Behavioral: Negative.           Personal History     Past Medical History:   Past Medical History:   Diagnosis Date    Arthritis     Diabetes 1.5, managed as type 2     Heart disease        Surgical History:      Past Surgical History:   Procedure Laterality Date    CHOLECYSTECTOMY      HAND SURGERY      MELANOMA WIDE LOCAL EXCISION Right     cheek    PACEMAKER IMPLANTATION      SKIN GRAFT      " scalp           Family History: family history is not on file. Otherwise pertinent FHx was reviewed and unremarkable.     Social History:  reports that he has never smoked. He has never used smokeless tobacco. He reports that he does not drink alcohol and does not use drugs.      Medications:  Prior to Admission medications    Medication Sig Start Date End Date Taking? Authorizing Provider   amiodarone (PACERONE) 200 MG tablet Take 1 tablet by mouth Daily. 10/9/24  Yes Ayse Hawkins MD   Eliquis 5 MG tablet tablet Take 1 tablet by mouth Every 12 (Twelve) Hours. 10/5/24  Yes Ayse Hawkins MD   furosemide (LASIX) 20 MG tablet Take 1 tablet by mouth Daily.   Yes Ayse Hawkins MD   losartan (COZAAR) 100 MG tablet Take 1 tablet by mouth Daily. 10/4/24  Yes Ayse Hawkins MD   metFORMIN (GLUCOPHAGE) 1000 MG tablet Take 1 tablet by mouth 2 (Two) Times a Day With Meals.   Yes Ayse Hawkins MD   pioglitazone (ACTOS) 30 MG tablet Take 1 tablet by mouth Daily. 8/29/24  Yes Ayse Hawkins MD   propranolol (INDERAL) 80 MG tablet Take 1 tablet by mouth Daily. 10/8/24  Yes Ayse Hawkins MD   simvastatin (ZOCOR) 20 MG tablet Take 1 tablet by mouth Daily. 10/4/24  Yes Ayse Hawkins MD   tamsulosin (FLOMAX) 0.4 MG capsule 24 hr capsule Take 1 capsule by mouth Daily. 10/4/24  Yes ProviderAyse MD       Allergies:    Allergies   Allergen Reactions    Celebrex [Celecoxib] Hives       Objective   Objective     Vital Signs  Temp:  [97.5 °F (36.4 °C)-97.8 °F (36.6 °C)] 97.8 °F (36.6 °C)  Heart Rate:  [58-65] 60  Resp:  [17-18] 17  BP: (120-166)/(65-99) 166/81  SpO2:  [95 %-99 %] 98 %  on   ;   Device (Oxygen Therapy): room air  Body mass index is 27.65 kg/m².    Physical Exam  Vitals reviewed.   Constitutional:       General: He is not in acute distress.     Appearance: Normal appearance. He is normal weight. He is not ill-appearing, toxic-appearing or diaphoretic.    HENT:      Head: Normocephalic.      Right Ear: External ear normal.  Patient is very hard of hearing     Left Ear: External ear normal.      Nose: Nose normal.      Mouth/Throat:      Mouth: Mucous membranes are moist.   Eyes:      Extraocular Movements: Extraocular movements intact.   Cardiovascular:      Rate and Rhythm: Normal rate and regular rhythm.      Pulses: Normal pulses.      Heart sounds: Normal heart sounds.   Pulmonary:      Effort: Pulmonary effort is normal.      Breath sounds: Normal breath sounds.   Abdominal:      General: Bowel sounds are normal.      Palpations: Abdomen is soft.      Tenderness: There is no abdominal tenderness.   Musculoskeletal:         General: Normal range of motion.      Cervical back: Normal range of motion.      Right lower leg: No edema.      Left lower leg: No edema.   Skin:     General: Skin is warm and dry.      Capillary Refill: Capillary refill takes less than 2 seconds.      Findings: Lesion present.   Neurological:      General: No focal deficit present.      Mental Status: He is alert and oriented to person, place, and time.   Psychiatric:         Mood and Affect: Mood normal.         Behavior: Behavior normal.         Thought Content: Thought content normal.         Judgment: Judgment normal.     Results Review:  I have personally reviewed most recent lab results and radiology images and interpretations and agree with findings, most notably: BMP, CBC, magnesium and x-ray of knees and hip.    Results from last 7 days   Lab Units 11/27/24  0336   WBC 10*3/mm3 5.94   HEMOGLOBIN g/dL 10.8*   HEMATOCRIT % 32.6*   PLATELETS 10*3/mm3 183     Results from last 7 days   Lab Units 11/27/24  0336   SODIUM mmol/L 130*   POTASSIUM mmol/L 3.6   CHLORIDE mmol/L 92*   CO2 mmol/L 26.6   BUN mg/dL 21   CREATININE mg/dL 1.26   GLUCOSE mg/dL 173*   CALCIUM mg/dL 9.5   ALK PHOS U/L 85   ALT (SGPT) U/L 16   AST (SGOT) U/L 29     Estimated Creatinine Clearance: 35.9 mL/min (by C-G  formula based on SCr of 1.26 mg/dL).  Brief Urine Lab Results  (Last result in the past 365 days)        Color   Clarity   Blood   Leuk Est   Nitrite   Protein   CREAT   Urine HCG        11/26/24 1726 Yellow   Clear   Negative   Negative   Negative   Negative                   Microbiology Results (last 10 days)       ** No results found for the last 240 hours. **            ECG/EMG Results (most recent)       Procedure Component Value Units Date/Time    Telemetry Scan [[id number]] Resulted: 11/26/24     Updated: 11/27/24 0324    Telemetry Scan [[id number]] Resulted: 11/26/24     Updated: 11/27/24 0325                    XR Hip With or Without Pelvis 2 - 3 View Left    Result Date: 11/26/2024  Impression: Negative for acute osseous abnormality. Electronically Signed: Damian Keen MD  11/26/2024 5:50 PM EST  Workstation ID: [id number]    XR Knee 1 or 2 View Bilateral    Result Date: 11/26/2024  Impression: 1.Degenerative changes in both knees. 2.No fractures are identified. 3.Soft tissue swelling laterally on the left. Electronically Signed: Jose Cassidy MD  11/26/2024 4:18 PM EST  Workstation ID: [id number]       Estimated Creatinine Clearance: 35.9 mL/min (by C-G formula based on SCr of 1.26 mg/dL).    Assessment & Plan   Assessment/Plan       Active Hospital Problems    Diagnosis  POA    **Weakness [R53.1]  Yes      Resolved Hospital Problems   No resolved problems to display.       Weakness with fall  -Sodium: 132 addressed as below  -UA unremarkable  -X-ray of knee showed degenerative changes with no fractures and soft tissues swelling on the left  -X-ray of hip showed no acute abnormality  -PT/OT consulted recommending inpatient rehab  -Case management consulted  -UR recommends inpatient admission     Hypomagnesemia  -Magnesium: 1.3 on presentation  -Mag sulfate given in the ED     Hyponatremia (chronic)  -Sodium: 132, 130 (generally at baseline  -Monitor while admitted     Diabetes mellitus  -Moderately controlled          Lab Results   Component Value Date     GLUCOSE 173 (H) 11/27/2024     GLUCOSE 148 (H) 11/26/2024     GLUCOSE 274 (H) 06/24/2024     GLUCOSE 358 (H) 01/18/2023   -Hold metformin  -Continue Actos  -Correctional insulin ordered  -Diabetic diet  -Monitor before meals and at bedtime    Anemia  Lab Results   Component Value Date    HGB 10.8 (L) 11/27/2024    MCV 91.3 11/27/2024    MCHC 33.1 11/27/2024   -Monitor while admitted     Atrial fibrillation  -Continue amiodarone, propranolol and Eliquis     Hypertension  -Poorly controlled       BP Readings from Last 1 Encounters:   11/27/24 166/81   - Continue losartan and Lasix  - Monitor while admitted     Hyperlipidemia  -Statin     BPH  -Flomax          VTE Prophylaxis - Active VTE Prophylaxis  Pharmacologic:        Start     Dose Route Frequency Stop    11/27/24 0900  apixaban (ELIQUIS) tablet 5 mg         5 mg PO Every 12 Hours Scheduled --                  Mechanical:        Start        11/26/24 1906  Maintain Sequential Compression Device  Continuous                              CODE STATUS:    Code Status and Medical Interventions: CPR (Attempt to Resuscitate); Full Support   Ordered at: 11/26/24 1906     Code Status (Patient has no pulse and is not breathing):    CPR (Attempt to Resuscitate)     Medical Interventions (Patient has pulse or is breathing):    Full Support       This patient has been examined wearing personal protective equipment.     I discussed the patient's findings and my recommendations with patient and nursing staff.      Signature:Electronically signed by Jayjay Ross PA-C, 11/27/24, 3:33 PM EST.

## 2024-11-27 NOTE — CASE MANAGEMENT/SOCIAL WORK
Case Management/Social Work    Patient Name:  Ap Roe  YOB: 1928  MRN: [id number]  Admit Date:  11/26/2024      Placed request in [facility] basket for w/c van transport to [facility] in the morning.  Called EMS to confirm they received request and they did.  SC sent to  who will be here on Thanksgiadriana Shen, Hyacinth Preciado and Vonnie Wilde pt.'s nurse to inform them of this and to call [phone] in the morning for transport time.    Dianna Valentine RN    [address] [address]  Phone: [phone]  Fax: [phone]

## 2024-11-27 NOTE — PLAN OF CARE
Goal Outcome Evaluation:  Plan of Care Reviewed With: patient           Outcome Evaluation: 89-year-old male presented via EMS from home after a mechanical fall. Patient states he tripped going up the steps in his garage landing on his knees. He has abrasions to bilateral knees, but imaging negative. This date, pt AOx4 and sitting in chair on arrival. Wife and daughter also present. At baseline, pt lives with his spouse in a H with 2 ISMAEL. Daughter reports they will be getting railing installed, as this is where pt fell. He is normally IND with ADLs and functional mobility without AD, though reports he has recently aquired a rollator and cane. OT provided education regarding transfer tub bench for safety while bathing, which his daughter took interest in and reported that she would aquire. From chair level, patient requires Mod-Max A with lower body dressing. Sit to stand completed with Min A, but required multiple trials. Min A required for balance in standing and patient able to briefly march in place requiring Min A. He has good BUE ROM, but strength is limited and he is noted to have tremors which negtively impact his fine motor control. Dtr reports this is chronic and not Parkinson's. Overall, pt is well below his baseline level of independent, and unsafe to return home with only his aging wife present to assist. OT recommend acute IP rehab at the time of d/c. Will follow.    Anticipated Discharge Disposition (OT): inpatient rehabilitation facility

## 2024-11-27 NOTE — THERAPY EVALUATION
Patient Name: Ap Roe  : 1/15/1928    MRN: [id number]                              Today's Date: 2024       Admit Date: 2024    Visit Dx:     ICD-10-CM ICD-9-CM   1. Weakness  R53.1 780.79   2. Fall, initial encounter  W19.XXXA E888.9   3. Abrasion of knee, bilateral  S80.211A 916.0    S80.212A    4. Acute pain of both knees  M25.561 338.19    M25.562 719.46   5. Left hip pain  M25.552 719.45     Patient Active Problem List   Diagnosis    Weakness     Past Medical History:   Diagnosis Date    Arthritis     Diabetes 1.5, managed as type 2     Heart disease      Past Surgical History:   Procedure Laterality Date    CHOLECYSTECTOMY      HAND SURGERY      MELANOMA WIDE LOCAL EXCISION Right     cheek    PACEMAKER IMPLANTATION      SKIN GRAFT      scalp      General Information       Row Name 24 1425          OT Time and Intention    Document Type evaluation  -LS     Mode of Treatment occupational therapy  -LS     Patient Effort excellent  -       Row Name 24 1425          General Information    Patient Profile Reviewed yes  -LS     Prior Level of Function independent:;ADL's;all household mobility  Has a cane and rollator though he does not use them.  -LS     Existing Precautions/Restrictions fall  -LS     Barriers to Rehab hearing deficit  -       Row Name 24 1425          Living Environment    People in Home spouse  -       Row Name 24 1425          Home Main Entrance    Number of Stairs, Main Entrance two  -       Row Name 24 1425          Stairs Within Home, Primary    Stairs, Within Home, Primary Daughter reports railings soon to be installed  -       Row Name 24 1427          Cognition    Orientation Status (Cognition) oriented to;person;place;time;situation  -       Row Name 24 1425          Safety Issues/Impairments Affecting Functional Mobility    Impairments Affecting Function (Mobility) balance;endurance/activity tolerance;motor  planning;strength;coordination  -     Comment, Safety Issues/Impairments (Mobility) tremors in the BUEs, not PD per dtr  -               User Key  (r) = Recorded By, (t) = Taken By, (c) = Cosigned By      Initials Name Provider Type    Bill Jack OT Occupational Therapist                     Mobility/ADL's       Row Name 11/27/24 1429          Bed Mobility    Bed Mobility bed mobility (all) activities  -     Comment, (Bed Mobility) Not tested, in chair upon arrival  -       Row Name 11/27/24 1429          Transfers    Transfers sit-stand transfer  -       Row Name 11/27/24 1429          Sit-Stand Transfer    Sit-Stand [place] (Transfers) minimum assist (75% patient effort)  -     Comment, (Sit-Stand Transfer) hand held assist  -       Row Name 11/27/24 1429          Functional Mobility    Functional Mobility- Ind. Level minimum assist (75% patient effort)  -     Patient was able to Ambulate yes  -       Row Name 11/27/24 1429          Activities of Daily Living    BADL Assessment/Intervention lower body dressing  -       Row Name 11/27/24 1429          Lower Body Dressing Assessment/Training    [place] Level (Lower Body Dressing) lower body dressing skills;maximum assist (25% patient effort);socks  -     Position (Lower Body Dressing) supported sitting  -               User Key  (r) = Recorded By, (t) = Taken By, (c) = Cosigned By      Initials Name Provider Type    Bill Jack OT Occupational Therapist                   Obj/Interventions       Row Name 11/27/24 1431          Sensory Assessment (Somatosensory)    Sensory Assessment (Somatosensory) sensation intact  -       Row Name 11/27/24 1431          Vision Assessment/Intervention    Visual Impairment/Limitations WFL  -       Row Name 11/27/24 1431          Range of Motion Comprehensive    General Range of Motion bilateral upper extremity ROM WFL  -       Row Name 11/27/24 1431          Strength Comprehensive  (MMT)    Comment, General Manual Muscle Testing (MMT) Assessment BUE grossly 3+/5  -LS       Row Name 11/27/24 1431          Balance    Balance Assessment sitting static balance;standing static balance;standing dynamic balance;sitting dynamic balance  -LS     Static Sitting Balance independent  -LS     Dynamic Sitting Balance independent  -LS     Position, Sitting Balance supported;sitting in chair  -LS     Static Standing Balance minimal assist  -LS     Dynamic Standing Balance minimal assist  -LS     Position/Device Used, Standing Balance supported  -LS     Comment, Balance hand held assistance  -LS               User Key  (r) = Recorded By, (t) = Taken By, (c) = Cosigned By      Initials Name Provider Type    LS Bill Whitley OT Occupational Therapist                   Goals/Plan       Row Name 11/27/24 1445          Bed Mobility Goal 1 (OT)    Activity/Assistive Device (Bed Mobility Goal 1, OT) bed mobility activities, all  -LS     [place] Level/Cues Needed (Bed Mobility Goal 1, OT) modified independence  -LS     Time Frame (Bed Mobility Goal 1, OT) long term goal (LTG);2 weeks  -       Row Name 11/27/24 1449          Transfer Goal 1 (OT)    Activity/Assistive Device (Transfer Goal 1, OT) transfers, all  -LS     [place] Level/Cues Needed (Transfer Goal 1, OT) modified independence  -LS     Time Frame (Transfer Goal 1, OT) long term goal (LTG);2 weeks  -       Row Name 11/27/24 1443          Dressing Goal 1 (OT)    Activity/Device (Dressing Goal 1, OT) dressing skills, all  -LS     [place]/Cues Needed (Dressing Goal 1, OT) modified independence  -LS     Time Frame (Dressing Goal 1, OT) long term goal (LTG);2 weeks  -       Row Name 11/27/24 1442          Toileting Goal 1 (OT)    Activity/Device (Toileting Goal 1, OT) toileting skills, all  -LS     [place] Level/Cues Needed (Toileting Goal 1, OT) modified independence  -LS     Time Frame (Toileting Goal 1, OT) long term goal (LTG);2  weeks  -LS       Row Name 11/27/24 1445          Therapy Assessment/Plan (OT)    Planned Therapy Interventions (OT) activity tolerance training;BADL retraining;functional balance retraining;IADL retraining;occupation/activity based interventions;ROM/therapeutic exercise;strengthening exercise;transfer/mobility retraining;patient/caregiver education/training  -               User Key  (r) = Recorded By, (t) = Taken By, (c) = Cosigned By      Initials Name Provider Type    LS Bill Whitley OT Occupational Therapist                   Clinical Impression       Row Name 11/27/24 1435          Pain Assessment    Pretreatment Pain Rating 0/10 - no pain  -LS     Posttreatment Pain Rating 0/10 - no pain  -LS       Row Name 11/27/24 1433          Plan of Care Review    Plan of Care Reviewed With patient  -LS     Outcome Evaluation 89-year-old male presented via EMS from home after a mechanical fall. Patient states he tripped going up the steps in his garage landing on his knees. He has abrasions to bilateral knees, but imaging negative. This date, pt AOx4 and sitting in chair on arrival. Wife and daughter also present. At baseline, pt lives with his spouse in a [facility] with 2 ISMAEL. Daughter reports they will be getting railing installed, as this is where pt fell. He is normally IND with ADLs and functional mobility without AD, though reports he has recently aquired a rollator and cane. OT provided education regarding transfer tub bench for safety while bathing, which his daughter took interest in and reported that she would aquire. From chair level, patient requires Mod-Max A with lower body dressing. Sit to stand completed with Min A, but required multiple trials. Min A required for balance in standing and patient able to briefly march in place requiring Min A. He has good BUE ROM, but strength is limited and he is noted to have tremors which negtively impact his fine motor control. Dtr reports this is chronic and not  Parkinson's. Overall, pt is well below his baseline level of independent, and unsafe to return home with only his aging wife present to assist. OT recommend acute IP rehab at the time of d/c. Will follow.  -       Row Name 11/27/24 1435          Therapy Assessment/Plan (OT)    Rehab Potential (OT) good  -LS     Criteria for Skilled Therapeutic Interventions Met (OT) yes;skilled treatment is necessary  -LS     Therapy Frequency (OT) 5 times/wk  -LS     Predicted Duration of Therapy Intervention (OT) until dc  -LS       Row Name 11/27/24 1435          Therapy Plan Review/Discharge Plan (OT)    Anticipated Discharge Disposition (OT) inpatient rehabilitation facility  -       Row Name 11/27/24 1435          Vital Signs    O2 Delivery Pre Treatment room air  -LS     O2 Delivery Intra Treatment room air  -LS     O2 Delivery Post Treatment room air  -LS     Pre Patient Position Sitting  -LS     Intra Patient Position Standing  -LS     Post Patient Position Sitting  -LS       Row Name 11/27/24 1435          Positioning and Restraints    Pre-Treatment Position sitting in chair/recliner  -LS     Post Treatment Position chair  -LS     In Chair notified nsg;sitting;call light within reach;encouraged to call for assist;exit alarm on  -LS               User Key  (r) = Recorded By, (t) = Taken By, (c) = Cosigned By      Initials Name Provider Type    Bill Jack, NATHALIA Occupational Therapist                   Outcome Measures       Row Name 11/27/24 1449          How much help from another is currently needed...    Putting on and taking off regular lower body clothing? 2  -LS     Bathing (including washing, rinsing, and drying) 2  -LS     Toileting (which includes using toilet bed pan or urinal) 2  -LS     Putting on and taking off regular upper body clothing 3  -LS     Taking care of personal grooming (such as brushing teeth) 3  -LS     Eating meals 4  -LS     AM-PAC 6 Clicks Score (OT) 16  -       Row Name 11/27/24 3601  11/27/24 0820       How much help from another person do you currently need...    Turning from your back to your side while in flat bed without using bedrails? 3  -OD 4  -RR    Moving from lying on back to sitting on the side of a flat bed without bedrails? 3  -OD 4  -RR    Moving to and from a bed to a chair (including a wheelchair)? 2  -OD 3  -RR    Standing up from a chair using your arms (e.g., wheelchair, bedside chair)? 2  -OD 2  -RR    Climbing 3-5 steps with a railing? 1  -OD 2  -RR    To walk in hospital room? 2  -OD 2  -RR    AM-PAC 6 Clicks Score (PT) 13  -OD 17  -RR    Highest Level of Mobility Goal 4 --> Transfer to chair/commode  -OD 5 --> Static standing  -RR      Row Name 11/27/24 1445 11/27/24 1153       Functional Assessment    Outcome Measure Options AM-PAC 6 Clicks Daily Activity (OT)  -LS AM-PAC 6 Clicks Basic Mobility (PT)  -OD              User Key  (r) = Recorded By, (t) = Taken By, (c) = Cosigned By      Initials Name Provider Type    Bill Jack, OT Occupational Therapist    OD Brandy Vallecillo PT Physical Therapist    RR Vonnie Bowen, RN Registered Nurse                    Occupational Therapy Education       Title: PT OT SLP Therapies (Done)       Topic: Occupational Therapy (Done)       Point: ADL training (Done)       Description:   Instruct learner(s) on proper safety adaptation and remediation techniques during self care or transfers.   Instruct in proper use of assistive devices.                  Learning Progress Summary            Patient Acceptance, E,TB, VU by  at 11/27/2024 1446                      Point: Precautions (Done)       Description:   Instruct learner(s) on prescribed precautions during self-care and functional transfers.                  Learning Progress Summary            Patient Acceptance, E,TB, VU by  at 11/27/2024 1446                      Point: Body mechanics (Done)       Description:   Instruct learner(s) on proper positioning and spine alignment  during self-care, functional mobility activities and/or exercises.                  Learning Progress Summary            Patient Acceptance, E,TB, VU by  at 11/27/2024 0585                                      User Key       Initials Effective Dates Name Provider Type Discipline    EILEEN 09/22/22 -  Bill Whitley OT Occupational Therapist OT                  OT Recommendation and Plan  Planned Therapy Interventions (OT): activity tolerance training, BADL retraining, functional balance retraining, IADL retraining, occupation/activity based interventions, ROM/therapeutic exercise, strengthening exercise, transfer/mobility retraining, patient/caregiver education/training  Therapy Frequency (OT): 5 times/wk  Plan of Care Review  Plan of Care Reviewed With: patient  Outcome Evaluation: 89-year-old male presented via EMS from home after a mechanical fall. Patient states he tripped going up the steps in his garage landing on his knees. He has abrasions to bilateral knees, but imaging negative. This date, pt AOx4 and sitting in chair on arrival. Wife and daughter also present. At baseline, pt lives with his spouse in a [facility] with 2 ISMAEL. Daughter reports they will be getting railing installed, as this is where pt fell. He is normally IND with ADLs and functional mobility without AD, though reports he has recently aquired a rollator and cane. OT provided education regarding transfer tub bench for safety while bathing, which his daughter took interest in and reported that she would aquire. From chair level, patient requires Mod-Max A with lower body dressing. Sit to stand completed with Min A, but required multiple trials. Min A required for balance in standing and patient able to briefly march in place requiring Min A. He has good BUE ROM, but strength is limited and he is noted to have tremors which negtively impact his fine motor control. Dtr reports this is chronic and not Parkinson's. Overall, pt is well below his baseline  level of independent, and unsafe to return home with only his aging wife present to assist. OT recommend acute IP rehab at the time of d/c. Will follow.     Time Calculation:         Time Calculation- OT       Row Name 11/27/24 1446             Time Calculation- OT    OT Start Time 1202  -LS      OT Stop Time 1223  -LS      OT Time Calculation (min) 21 min  -LS      OT Received On 11/27/24  -      OT - Next Appointment 11/29/24  -      OT Goal Re-Cert Due Date 12/11/24  -         Untimed Charges    OT Eval/Re-eval Minutes 21  -LS         Total Minutes    Untimed Charges Total Minutes 21  -LS       Total Minutes 21  -LS                User Key  (r) = Recorded By, (t) = Taken By, (c) = Cosigned By      Initials Name Provider Type    Bill Jack OT Occupational Therapist                  Therapy Charges for Today       Code Description Service Date Service Provider Modifiers Qty    59688406373 HC OT EVAL MOD COMPLEXITY 4 11/27/2024 Bill Whitley OT GO 1                 Bill Whitley OT  11/27/2024

## 2024-11-27 NOTE — CONSULTS
"Diabetes Education  Assessment/Teaching    Patient Name:  Ap Roe  YOB: 1928  MRN: [id number]  Admit Date:  11/26/2024      Assessment Date:  11/27/2024  Flowsheet Row Most Recent Value   General Information     Referral From: MD order  [MD consult per database]   Height 172.7 cm (68\")   Height Method Stated   Weight 82.5 kg (181 lb 14.1 oz)   Weight Method Bed scale   Pregnancy Assessment    Diabetes History    What type of diabetes do you have? Type 2   Length of Diabetes Diagnosis 10 + years  [Patient was diagnosed in 1995]   Current DM knowledge fair   Do you test your blood sugar at home? yes   Frequency of checks once a week per MD's instructions   Meter type [employer] brand < a year old   Who performs the test? patient   Typical readings low 200s   Have you had high blood sugar? (>140mg/dl) yes   How often do you have high blood sugar? frequently   When was your last high blood sugar? Admission blood sugar 148   Education Preferences    What areas of diabetes would you like to learn about? avoiding high blood sugar, diabetes complications   Nutrition Information    Assessment Topics    Problem Solving - Assessment Needs education   Reducing Risk - Assessment Needs education   DM Goals    Problem Solving - Goal Today   Reducing Risk - Goal Today            Flowsheet Row Most Recent Value   DM Education Needs    Meter Has own   Meter Type Other (comment)  [[employer] brand]   Frequency of Testing Weekly   Medication Oral  [At home wife stated that patient takes Metformin 1000 mg BID and Actos 30 mg daily.]   Problem Solving Hyperglycemia, Signs, Symptoms, Treatment   Discharge Plan Home   Motivation Moderate   Teaching Method Discussion, Handouts   Patient Response Other (comment)  [Patient drowsy and unable to stay awake. Wife at bedside and answered questions.]              Other Comments:  A1c info sheet given with discussion on A1c target and healthy blood sugar range. Wife stated " that when patient was diagnosed he was 50 pounds heavier. Patient and wife started walking 2 miles a day and patient lost 50 pounds. Wife stated that they changed their eating habits. Wife stated that patient goes to physical therapy once a week and he fell yesterday when they tried to go to physical therapy which landed him in the hospital. Wife stated that patient's doctor is okay with patient's A1c and blood sugar results with patient being 89 years old. Wife has no further questions or concerns related to diabetes at this time.        Electronically signed by:  Savanna Laughlin RN  11/27/24 12:09 EST

## 2024-11-27 NOTE — PLAN OF CARE
Goal Outcome Evaluation:  Plan of Care Reviewed With: patient, spouse, child        Progress: no change  Outcome Evaluation: Ap Roe is a 89 y/o M who was admitted to [facility] on 11/26/24 after falling on his knees at home. Imaging hip and knee (-). Pt is AAOx1 and Tribe this date, able to report living in a [facility] with spouse and is IND with ADLs and mobility using cane. However pt is disoriented to situation, does not remember falling. Spouse present to help obtain PLOF. Spouse reports they live in a [facility] with 2 ISMAEL. Pt is current with Outpatient PT for weakness and balance. This date, pt demonstrates global BLE weakness, impaired sequencing and motor planning, and observed resting bilat hand tremor. Pt requires modAx2 and short, specific commands to achieve transferring bed>BSC and BSC>recliner. Pt appears far below baseline function and would benefit from IP rehab upon d/c to facilitate return to PLOF. PT will follow while admitted.    Anticipated Discharge Disposition (PT): inpatient rehabilitation facility

## 2024-11-27 NOTE — PAYOR COMM NOTE
"This is clinical for Luh Reo.    AUTHORIZATION PENDIN  PLEASE FAX OR CALL DETERMINATION TO CONTACT BELOW:   THANK YOU.      Elva Laurent, RN, CCM  Utilization Nurse  [address] [address]   [phone]  Fx [phone]     Luh Roe (89 y.o. Male)       Date of Birth   01/15/1928    Social Security Number       Address   [address] [place] [id number]    Home Phone   [phone]    MRN   [id number]       Rastafari   Gnosticist    Marital Status                               Admission Date   24    Admission Type   Emergency    Admitting Provider   Shaji Loredo MD    Attending Provider   Shaji Loredo MD    Department, Room/Bed   [facility] OBSERVATION, 114       Discharge Date       Discharge Disposition       Discharge Destination                                 Attending Provider: Shaji Loredo MD    Allergies: Celebrex [Celecoxib]    Isolation: None   Infection: None   Code Status: CPR    Ht: 172.7 cm (68\")   Wt: 82.5 kg (181 lb 14.1 oz)    Admission Cmt: None   Principal Problem: Weakness [R53.1]                   Active Insurance as of 2024       Primary Coverage       Payor Plan Insurance Group Employer/Plan Group    HUMANA MEDICARE REPLACEMENT HUMANA MED ADV GROUP [id number]       Payor Plan Address Payor Plan Phone Number Payor Plan Fax Number Effective Dates    [address] [address] [phone]  2018 - None Entered    Formerly [facility] [id number]         Subscriber Name Subscriber Birth Date Member ID       LUH ROE 1/15/1928 [id number]                     Emergency Contacts        (Rel.) Home Phone Work Phone Mobile Phone    MICHAEL MENDOZA (Daughter) -- -- [phone]    ARPITRILEY (Spouse) [phone] -- --    Joyce Marte (Daughter) [phone] -- --                 History & Physical        Hyacinth Preciado APRN at 24 [facility]8              [facility] " Services  History & Physical    Patient Name: Ap Roe  : 1/15/1928  MRN: [id number]  Primary Care Physician:  Elva Mota MD  Date of admission: 2024  Date and Time of Service: 2024 at 1709    Subjective      Chief Complaint: fall    History of Present Illness: Ap Roe is a 89 y.o. male with a CMH of diabetes, coronary artery disease who presented to [facility] on 2024 with a fall that occurred at home.  He said he was walking up the steps in his garage and fell landing on his knees.  He is also complaining of left hip pain. He did not hit his head.  On ED evaluation no fractures were identified. Patient was originally admitted under observation status.  PT and OT have evaluated this patient and they are recommending inpatient rehab. Hospitalist team to admit for further medical management.      Review of Systems   Constitutional:  Positive for fatigue.   Musculoskeletal:  Positive for arthralgias and gait problem.   Neurological:  Positive for weakness.       Personal History     Past Medical History:   Diagnosis Date    Arthritis     Diabetes 1.5, managed as type 2     Heart disease        Past Surgical History:   Procedure Laterality Date    CHOLECYSTECTOMY      HAND SURGERY      MELANOMA WIDE LOCAL EXCISION Right     cheek    PACEMAKER IMPLANTATION      SKIN GRAFT      scalp       Family History: family history is not on file. Otherwise pertinent FHx was reviewed and not pertinent to current issue.    Social History:  reports that he has never smoked. He has never used smokeless tobacco. He reports that he does not drink alcohol and does not use drugs.    Home Medications:  Prior to Admission Medications       Prescriptions Last Dose Informant Patient Reported? Taking?    amiodarone (PACERONE) 200 MG tablet 2024  Yes Yes    Take 1 tablet by mouth Daily.    Eliquis 5 MG tablet tablet 2024  Yes Yes    Take 1 tablet by mouth Every 12 (Twelve) Hours.     furosemide (LASIX) 20 MG tablet 11/26/2024  Yes Yes    Take 1 tablet by mouth Daily.    losartan (COZAAR) 100 MG tablet 11/26/2024  Yes Yes    Take 1 tablet by mouth Daily.    metFORMIN (GLUCOPHAGE) 1000 MG tablet 11/26/2024  Yes Yes    Take 1 tablet by mouth 2 (Two) Times a Day With Meals.    pioglitazone (ACTOS) 30 MG tablet 11/26/2024  Yes Yes    Take 1 tablet by mouth Daily.    propranolol (INDERAL) 80 MG tablet 11/26/2024  Yes Yes    Take 1 tablet by mouth Daily.    simvastatin (ZOCOR) 20 MG tablet 11/26/2024  Yes Yes    Take 1 tablet by mouth Daily.    tamsulosin (FLOMAX) 0.4 MG capsule 24 hr capsule 11/26/2024  Yes Yes    Take 1 capsule by mouth Daily.              Allergies:  Allergies   Allergen Reactions    Celebrex [Celecoxib] Hives       Objective      Vitals:   Temp:  [97.5 °F (36.4 °C)-97.8 °F (36.6 °C)] 97.8 °F (36.6 °C)  Heart Rate:  [60-61] 60  Resp:  [17-18] 17  BP: (120-166)/(65-99) 166/81  Body mass index is 27.65 kg/m².  Physical Exam  Vitals and nursing note reviewed.   Constitutional:       Appearance: Normal appearance.   HENT:      Head: Normocephalic and atraumatic.      Nose: Nose normal.      Mouth/Throat:      Mouth: Mucous membranes are moist.   Eyes:      Extraocular Movements: Extraocular movements intact.      Pupils: Pupils are equal, round, and reactive to light.   Cardiovascular:      Rate and Rhythm: Normal rate and regular rhythm.      Pulses: Normal pulses.   Pulmonary:      Effort: Pulmonary effort is normal.      Breath sounds: Normal breath sounds.   Abdominal:      General: Bowel sounds are normal.      Palpations: Abdomen is soft.   Musculoskeletal:         General: Normal range of motion.      Cervical back: Normal range of motion and neck supple.      Right lower leg: Edema present.      Left lower leg: Edema present.   Skin:     General: Skin is warm.      Capillary Refill: Capillary refill takes less than 2 seconds.      Findings: Lesion (left knee) present.    Neurological:      Mental Status: He is alert and oriented to person, place, and time.         Diagnostic Data:  Lab Results (last 24 hours)       Procedure Component Value Units Date/Time    POC Glucose Once [[id number]]  (Abnormal) Collected: 11/27/24 1627    Specimen: Blood Updated: 11/27/24 1628     Glucose 169 mg/dL      Comment: Serial Number: [id number]:  [id number]       POC Glucose 4x Daily Before Meals & at Bedtime [[id number]]  (Abnormal) Collected: 11/27/24 1142    Specimen: Blood Updated: 11/27/24 1144     Glucose 211 mg/dL      Comment: Serial Number: [id number]:  [id number]       POC Glucose 4x Daily Before Meals & at Bedtime [[id number]]  (Abnormal) Collected: 11/27/24 0733    Specimen: Blood Updated: 11/27/24 0735     Glucose 187 mg/dL      Comment: Serial Number: [id number]:  [id number]       Comprehensive Metabolic Panel [[id number]]  (Abnormal) Collected: 11/27/24 0336    Specimen: Blood from Arm, Right Updated: 11/27/24 0417     Glucose 173 mg/dL      BUN 21 mg/dL      Creatinine 1.26 mg/dL      Sodium 130 mmol/L      Potassium 3.6 mmol/L      Chloride 92 mmol/L      CO2 26.6 mmol/L      Calcium 9.5 mg/dL      Total Protein 6.5 g/dL      Albumin 3.7 g/dL      ALT (SGPT) 16 U/L      AST (SGOT) 29 U/L      Alkaline Phosphatase 85 U/L      Total Bilirubin 0.8 mg/dL      Globulin 2.8 gm/dL      A/G Ratio 1.3 g/dL      BUN/Creatinine Ratio 16.7     Anion Gap 11.4 mmol/L      eGFR 52.2 mL/min/1.73     Narrative:      GFR Normal >60  Chronic Kidney Disease <60  Kidney Failure <15    The GFR formula is only valid for adults with stable renal function between ages 18 and 70.    CBC & Differential [[id number]]  (Abnormal) Collected: 11/27/24 0336    Specimen: Blood from Arm, Right Updated: 11/27/24 0356    Narrative:      The following orders were created for panel order CBC & Differential.  Procedure                               Abnormality         Status                     ---------                                -----------         ------                     CBC Auto Differential[[id number]]        Abnormal            Final result                 Please view results for these tests on the individual orders.    CBC Auto Differential [[id number]]  (Abnormal) Collected: 11/27/24 0336    Specimen: Blood from Arm, Right Updated: 11/27/24 0356     WBC 5.94 10*3/mm3      RBC 3.57 10*6/mm3      Hemoglobin 10.8 g/dL      Hematocrit 32.6 %      MCV 91.3 fL      MCH 30.3 pg      MCHC 33.1 g/dL      RDW 14.0 %      RDW-SD 46.6 fl      MPV 7.9 fL      Platelets 183 10*3/mm3      Neutrophil % 58.0 %      Lymphocyte % 24.2 %      Monocyte % 16.5 %      Eosinophil % 0.5 %      Basophil % 0.3 %      Immature Grans % 0.5 %      Neutrophils, Absolute 3.44 10*3/mm3      Lymphocytes, Absolute 1.44 10*3/mm3      Monocytes, Absolute 0.98 10*3/mm3      Eosinophils, Absolute 0.03 10*3/mm3      Basophils, Absolute 0.02 10*3/mm3      Immature Grans, Absolute 0.03 10*3/mm3      nRBC 0.0 /100 WBC     Comprehensive Metabolic Panel [[id number]]  (Abnormal) Collected: 11/26/24 1732    Specimen: Blood Updated: 11/26/24 1824     Glucose 148 mg/dL      BUN 23 mg/dL      Creatinine 1.27 mg/dL      Sodium 132 mmol/L      Potassium 4.5 mmol/L      Chloride 93 mmol/L      CO2 24.7 mmol/L      Calcium 9.6 mg/dL      Total Protein 6.9 g/dL      Albumin 4.0 g/dL      ALT (SGPT) 16 U/L      AST (SGOT) 29 U/L      Alkaline Phosphatase 101 U/L      Total Bilirubin 0.8 mg/dL      Globulin 2.9 gm/dL      A/G Ratio 1.4 g/dL      BUN/Creatinine Ratio 18.1     Anion Gap 14.3 mmol/L      eGFR 51.7 mL/min/1.73     Narrative:      GFR Normal >60  Chronic Kidney Disease <60  Kidney Failure <15    The GFR formula is only valid for adults with stable renal function between ages 18 and 70.    Magnesium [[id number]]  (Abnormal) Collected: 11/26/24 1732    Specimen: Blood Updated: 11/26/24 1824     Magnesium 1.3 mg/dL     CBC & Differential [[id number]]   (Abnormal) Collected: 11/26/24 1732    Specimen: Blood Updated: 11/26/24 1743    Narrative:      The following orders were created for panel order CBC & Differential.  Procedure                               Abnormality         Status                     ---------                               -----------         ------                     CBC Auto Differential[[id number]]        Abnormal            Final result                 Please view results for these tests on the individual orders.    CBC Auto Differential [[id number]]  (Abnormal) Collected: 11/26/24 1732    Specimen: Blood Updated: 11/26/24 1743     WBC 6.39 10*3/mm3      RBC 3.65 10*6/mm3      Hemoglobin 11.3 g/dL      Hematocrit 33.8 %      MCV 92.6 fL      MCH 31.0 pg      MCHC 33.4 g/dL      RDW 14.2 %      RDW-SD 47.9 fl      MPV 7.7 fL      Platelets 190 10*3/mm3      Neutrophil % 68.2 %      Lymphocyte % 20.3 %      Monocyte % 10.6 %      Eosinophil % 0.2 %      Basophil % 0.2 %      Immature Grans % 0.5 %      Neutrophils, Absolute 4.36 10*3/mm3      Lymphocytes, Absolute 1.30 10*3/mm3      Monocytes, Absolute 0.68 10*3/mm3      Eosinophils, Absolute 0.01 10*3/mm3      Basophils, Absolute 0.01 10*3/mm3      Immature Grans, Absolute 0.03 10*3/mm3      nRBC 0.0 /100 WBC     Urinalysis With Microscopic If Indicated (No Culture) - Urine, Clean Catch [[id number]]  (Normal) Collected: 11/26/24 1726    Specimen: Urine, Clean Catch Updated: 11/26/24 1736     Color, UA Yellow     Appearance, UA Clear     pH, UA 5.5     Specific Gravity, UA 1.007     Glucose, UA Negative     Ketones, UA Negative     Bilirubin, UA Negative     Blood, UA Negative     Protein, UA Negative     Leuk Esterase, UA Negative     Nitrite, UA Negative     Urobilinogen, UA 1.0 E.U./dL    Narrative:      Urine microscopic not indicated.             Imaging Results (Last 24 Hours)       Procedure Component Value Units Date/Time    XR Hip With or Without Pelvis 2 - 3 View Left [[id number]]  Collected: 11/26/24 1748     Updated: 11/26/24 1752    Narrative:      XR HIP W OR WO PELVIS 2-3 VIEW LEFT    Date of Exam: 11/26/2024 5:09 PM EST    Indication: hip pain after fall    Comparison: None available.    Findings:  The proximal left femur is intact. The iliopectineal and ilioischial lines are intact bilaterally. Mild bilateral hip osteoarthritis. Multilevel disc narrowing in the visualized lower lumbar spine. Vascular calcifications noted.      Impression:      Impression:  Negative for acute osseous abnormality.        Electronically Signed: Damian Keen MD    11/26/2024 5:50 PM EST    Workstation ID: [id number]              Assessment & Plan        This is a 89 y.o. male with:    Active and Resolved Problems  Active Hospital Problems    Diagnosis  POA    **Weakness [R53.1]  Yes      Resolved Hospital Problems   No resolved problems to display.       Fall   Weakness  - X-ray of knee showed degenerative changes with no fractures and soft tissues swelling on the left  - X-ray of hip showed no acute abnormality  - fall precautions  - PT/OT consulted recommending inpatient rehab  - Pre-cert for [facility] rehab pending     Hypomagnesemia  - Magnesium: 1.3 on admit   - Mag sulfate given in the ED  - Repeat lab pending     Hyponatremia   - appears chronic   - Sodium: 132, 130 at baseline   -Monitor while admitted     Diabetes mellitus  -   - Hold metformin  - Continue Actos  - SSI ordered  - Diabetic diet     Anemia   - Hgb 10.8  - No overt s/sx of bleeding   - Continue to monitor CBC  - Transfuse if Hgb < 7    Atrial fibrillation  -Continue amiodarone, propranolol and Eliquis     Hypertension  - /81  - continue losartan    VTE Prophylaxis:  Pharmacologic & mechanical VTE prophylaxis orders are present.        The patient desires to be as follows:    CODE STATUS:    Code Status (Patient has no pulse and is not breathing): CPR (Attempt to Resuscitate)  Medical Interventions (Patient has pulse or is  "breathing): Full Support        Giuliana Roe, who can be contacted at [phone], is the designated person to make medical decisions on the patient's behalf if He is incapable of doing so. This was clarified with patient and/or next of kin on 2024 during the course of this H&P.    Admission Status:  I believe this patient meets inpatient status.    Expected Length of Stay: 1-2 days    PDMP and Medication Dispenses via Sidebar reviewed and consistent with patient reported medications.    I discussed the patient's findings and my recommendations with patient.      Signature:     This document has been electronically signed by DOMONIQUE Hernandez on 2024 17:09 EST   [facility] Hospitalist Team      Electronically signed by Hyacinth Preciado APRN at 24 1742       Jayjay Ross PA-C at 24 1531          [facility] H&P    Patient Name: Ap Roe  : 1/15/1928  MRN: [id number]  Primary Care Physician: Elva Mota MD  Date of admission: 2024     Patient Care Team:  Elva Mota MD as PCP - General (Internal Medicine)          Subjective  History Present Illness     Chief Complaint:   Chief Complaint   Patient presents with    Fall         History of Present Illness  Obtained from ED provider HPI on 2024:  Patient is a 89-year-old male presented via EMS from home after mechanical fall patient states he tripped going up the steps in his garage landing on his knees.  He has some abrasions noted on his knees bilaterally does report some pain in this region.  He denies pain elsewhere.  He denies any head injury or loss of consciousness.  He denies any lightheadedness or dizziness to cause the fall.  Denies any numbness or weakness of his legs.     24:  Patient and family with him confirms the HPI noted above reporting a fall the previous day without head trauma or loss of consciousness.  Patient reports that he \"missed a step\" but family do note that " he has been having some increased fatigue.  He denies any pain, dyspnea or other symptoms at the time of my exam.        ROS  Review of Systems   Constitutional: Negative.   HENT: Negative.     Eyes: Negative.    Cardiovascular: Negative.    Respiratory: Negative.     Skin: Negative.    Musculoskeletal:  Positive for falls and joint pain.   Gastrointestinal: Negative.    Genitourinary: Negative.    Neurological: Negative.    Psychiatric/Behavioral: Negative.           Personal History     Past Medical History:   Past Medical History:   Diagnosis Date    Arthritis     Diabetes 1.5, managed as type 2     Heart disease        Surgical History:      Past Surgical History:   Procedure Laterality Date    CHOLECYSTECTOMY      HAND SURGERY      MELANOMA WIDE LOCAL EXCISION Right     cheek    PACEMAKER IMPLANTATION      SKIN GRAFT      scalp           Family History: family history is not on file. Otherwise pertinent FHx was reviewed and unremarkable.     Social History:  reports that he has never smoked. He has never used smokeless tobacco. He reports that he does not drink alcohol and does not use drugs.      Medications:  Prior to Admission medications    Medication Sig Start Date End Date Taking? Authorizing Provider   amiodarone (PACERONE) 200 MG tablet Take 1 tablet by mouth Daily. 10/9/24  Yes Ayse Hawkins MD   Eliquis 5 MG tablet tablet Take 1 tablet by mouth Every 12 (Twelve) Hours. 10/5/24  Yes Ayse Hawkins MD   furosemide (LASIX) 20 MG tablet Take 1 tablet by mouth Daily.   Yes ProviderAyse MD   losartan (COZAAR) 100 MG tablet Take 1 tablet by mouth Daily. 10/4/24  Yes Ayse Hawkins MD   metFORMIN (GLUCOPHAGE) 1000 MG tablet Take 1 tablet by mouth 2 (Two) Times a Day With Meals.   Yes Ayse Hawkins MD   pioglitazone (ACTOS) 30 MG tablet Take 1 tablet by mouth Daily. 8/29/24  Yes Ayse Hawkins MD   propranolol (INDERAL) 80 MG tablet Take 1 tablet by mouth Daily.  10/8/24  Yes Provider, MD Ayse   simvastatin (ZOCOR) 20 MG tablet Take 1 tablet by mouth Daily. 10/4/24  Yes Provider, MD Ayse   tamsulosin (FLOMAX) 0.4 MG capsule 24 hr capsule Take 1 capsule by mouth Daily. 10/4/24  Yes Provider, Ayse, MD       Allergies:    Allergies   Allergen Reactions    Celebrex [Celecoxib] Hives       Objective  Objective     Vital Signs  Temp:  [97.5 °F (36.4 °C)-97.8 °F (36.6 °C)] 97.8 °F (36.6 °C)  Heart Rate:  [58-65] 60  Resp:  [17-18] 17  BP: (120-166)/(65-99) 166/81  SpO2:  [95 %-99 %] 98 %  on   ;   Device (Oxygen Therapy): room air  Body mass index is 27.65 kg/m².    Physical Exam  Vitals reviewed.   Constitutional:       General: He is not in acute distress.     Appearance: Normal appearance. He is normal weight. He is not ill-appearing, toxic-appearing or diaphoretic.   HENT:      Head: Normocephalic.      Right Ear: External ear normal.  Patient is very hard of hearing     Left Ear: External ear normal.      Nose: Nose normal.      Mouth/Throat:      Mouth: Mucous membranes are moist.   Eyes:      Extraocular Movements: Extraocular movements intact.   Cardiovascular:      Rate and Rhythm: Normal rate and regular rhythm.      Pulses: Normal pulses.      Heart sounds: Normal heart sounds.   Pulmonary:      Effort: Pulmonary effort is normal.      Breath sounds: Normal breath sounds.   Abdominal:      General: Bowel sounds are normal.      Palpations: Abdomen is soft.      Tenderness: There is no abdominal tenderness.   Musculoskeletal:         General: Normal range of motion.      Cervical back: Normal range of motion.      Right lower leg: No edema.      Left lower leg: No edema.   Skin:     General: Skin is warm and dry.      Capillary Refill: Capillary refill takes less than 2 seconds.      Findings: Lesion present.   Neurological:      General: No focal deficit present.      Mental Status: He is alert and oriented to person, place, and time.   Psychiatric:          Mood and Affect: Mood normal.         Behavior: Behavior normal.         Thought Content: Thought content normal.         Judgment: Judgment normal.     Results Review:  I have personally reviewed most recent lab results and radiology images and interpretations and agree with findings, most notably: BMP, CBC, magnesium and x-ray of knees and hip.    Results from last 7 days   Lab Units 11/27/24  0336   WBC 10*3/mm3 5.94   HEMOGLOBIN g/dL 10.8*   HEMATOCRIT % 32.6*   PLATELETS 10*3/mm3 183     Results from last 7 days   Lab Units 11/27/24  0336   SODIUM mmol/L 130*   POTASSIUM mmol/L 3.6   CHLORIDE mmol/L 92*   CO2 mmol/L 26.6   BUN mg/dL 21   CREATININE mg/dL 1.26   GLUCOSE mg/dL 173*   CALCIUM mg/dL 9.5   ALK PHOS U/L 85   ALT (SGPT) U/L 16   AST (SGOT) U/L 29     Estimated Creatinine Clearance: 35.9 mL/min (by C-G formula based on SCr of 1.26 mg/dL).  Brief Urine Lab Results  (Last result in the past 365 days)        Color   Clarity   Blood   Leuk Est   Nitrite   Protein   CREAT   Urine HCG        11/26/24 1726 Yellow   Clear   Negative   Negative   Negative   Negative                   Microbiology Results (last 10 days)       ** No results found for the last 240 hours. **            ECG/EMG Results (most recent)       Procedure Component Value Units Date/Time    Telemetry Scan [[id number]] Resulted: 11/26/24     Updated: 11/27/24 0324    Telemetry Scan [[id number]] Resulted: 11/26/24     Updated: 11/27/24 0325                    XR Hip With or Without Pelvis 2 - 3 View Left    Result Date: 11/26/2024  Impression: Negative for acute osseous abnormality. Electronically Signed: Damian Keen MD  11/26/2024 5:50 PM EST  Workstation ID: [id number]    XR Knee 1 or 2 View Bilateral    Result Date: 11/26/2024  Impression: 1.Degenerative changes in both knees. 2.No fractures are identified. 3.Soft tissue swelling laterally on the left. Electronically Signed: Jose Cassidy MD  11/26/2024 4:18 PM EST  Workstation ID:  [id number]       Estimated Creatinine Clearance: 35.9 mL/min (by C-G formula based on SCr of 1.26 mg/dL).    Assessment & Plan  Assessment/Plan       Active Hospital Problems    Diagnosis  POA    **Weakness [R53.1]  Yes      Resolved Hospital Problems   No resolved problems to display.       Weakness with fall  -Sodium: 132 addressed as below  -UA unremarkable  -X-ray of knee showed degenerative changes with no fractures and soft tissues swelling on the left  -X-ray of hip showed no acute abnormality  -PT/OT consulted recommending inpatient rehab  -Case management consulted  -UR recommends inpatient admission     Hypomagnesemia  -Magnesium: 1.3 on presentation  -Mag sulfate given in the ED     Hyponatremia (chronic)  -Sodium: 132, 130 (generally at baseline  -Monitor while admitted     Diabetes mellitus  -Moderately controlled         Lab Results   Component Value Date     GLUCOSE 173 (H) 11/27/2024     GLUCOSE 148 (H) 11/26/2024     GLUCOSE 274 (H) 06/24/2024     GLUCOSE 358 (H) 01/18/2023   -Hold metformin  -Continue Actos  -Correctional insulin ordered  -Diabetic diet  -Monitor before meals and at bedtime    Anemia  Lab Results   Component Value Date    HGB 10.8 (L) 11/27/2024    MCV 91.3 11/27/2024    MCHC 33.1 11/27/2024   -Monitor while admitted     Atrial fibrillation  -Continue amiodarone, propranolol and Eliquis     Hypertension  -Poorly controlled       BP Readings from Last 1 Encounters:   11/27/24 166/81   - Continue losartan and Lasix  - Monitor while admitted     Hyperlipidemia  -Statin     BPH  -Flomax          VTE Prophylaxis - Active VTE Prophylaxis  Pharmacologic:        Start     Dose Route Frequency Stop    11/27/24 0900  apixaban (ELIQUIS) tablet 5 mg         5 mg PO Every 12 Hours Scheduled --                  Mechanical:        Start        11/26/24 1906  Maintain Sequential Compression Device  Continuous                              CODE STATUS:    Code Status and Medical Interventions: CPR  (Attempt to Resuscitate); Full Support   Ordered at: 11/26/24 1906     Code Status (Patient has no pulse and is not breathing):    CPR (Attempt to Resuscitate)     Medical Interventions (Patient has pulse or is breathing):    Full Support       This patient has been examined wearing personal protective equipment.     I discussed the patient's findings and my recommendations with patient and nursing staff.      Signature:Electronically signed by Jayajy Ross PA-C, 11/27/24, 3:33 PM EST.                Electronically signed by Jayjay Ross PA-C at 11/27/24 1636          Emergency Department Notes        Lacey Dela Cruz PA at 11/26/24 1547       Attestation signed by Chuy Rick MD at 11/26/24 7568        SHARED APC NON FACE TO FACE: I performed a substantive part of the MDM during the patient's E/M visit. I personally made or approved the documented management plan and acknowledge its risk of complications.   Chuy Rick MD 11/26/2024 21:23 EST                         Subjective   History of Present Illness  Patient is a 89-year-old male presented via EMS from home after mechanical fall patient states he tripped going up the steps in his garage landing on his knees.  He has some abrasions noted on his knees bilaterally does report some pain in this region.  He denies pain elsewhere.  He denies any head injury or loss of consciousness.  He denies any lightheadedness or dizziness to cause the fall.  Denies any numbness or weakness of his legs.    History provided by:  Patient and spouse      Review of Systems   Constitutional:  Negative for fever.   Respiratory:  Negative for shortness of breath.    Cardiovascular:  Negative for chest pain.   Gastrointestinal:  Negative for nausea and vomiting.   Musculoskeletal:  Positive for arthralgias. Negative for joint swelling.   Skin:  Positive for wound.   Neurological:  Negative for weakness and numbness.       No past medical history on  file.    Allergies   Allergen Reactions    Celebrex [Celecoxib] Hives       No past surgical history on file.    No family history on file.    Social History     Socioeconomic History    Marital status:            Objective   Physical Exam  Vitals and nursing note reviewed.   Constitutional:       General: He is not in acute distress.     Appearance: Normal appearance. He is well-developed. He is not ill-appearing, toxic-appearing or diaphoretic.   HENT:      Head: Normocephalic and atraumatic.      Mouth/Throat:      Mouth: Mucous membranes are moist.      Pharynx: Oropharynx is clear.   Eyes:      Extraocular Movements: Extraocular movements intact.      Pupils: Pupils are equal, round, and reactive to light.   Cardiovascular:      Rate and Rhythm: Normal rate and regular rhythm.      Pulses: Normal pulses.      Heart sounds: No murmur heard.     No friction rub. No gallop.   Pulmonary:      Effort: Pulmonary effort is normal. No tachypnea or accessory muscle usage.      Breath sounds: Normal breath sounds. No decreased breath sounds, wheezing, rhonchi or rales.   Chest:      Chest wall: No mass, deformity, tenderness or crepitus.   Musculoskeletal:      Right hip: No deformity, tenderness or bony tenderness. Normal range of motion.      Left hip: No deformity, tenderness or bony tenderness. Normal range of motion.      Right upper leg: No bony tenderness.      Left upper leg: No bony tenderness.      Right knee: No swelling or deformity. Normal range of motion. Tenderness present.      Left knee: No swelling or deformity. Normal range of motion. Tenderness present.      Right lower leg: No bony tenderness.      Left lower leg: No bony tenderness.      Right ankle: Normal.      Left ankle: Normal.      Right foot: Normal.      Left foot: Normal.        Legs:    Skin:     General: Skin is warm.      Capillary Refill: Capillary refill takes less than 2 seconds.      Findings: No rash.   Neurological:       "Mental Status: He is alert and oriented to person, place, and time.   Psychiatric:         Mood and Affect: Mood normal.         Behavior: Behavior normal.         Procedures          ED Course  ED Course as of 11/26/24 2051 Tue Nov 26, 2024   1645 Daughter now at bedside states that when she was moving his leg around earlier he was complaining of some left hip pain.  He denied this on my exam but on repeat he does report it is a little tender in his left hip.  Will add on x-ray of hip [AA]   1645 Patient's daughter also states that he reported to her that he felt weak during the fall will add on basic labs  [AA]   1814 Tried to ambulate patient and he was very weak needed to person assist. This is not his baseline  [AA]      ED Course User Index  [AA] Lacey Dela Cruz PA    /66   Pulse 60   Temp 97.7 °F (36.5 °C) (Oral)   Resp 17   Ht 177.8 cm (70\")   Wt 80.7 kg (178 lb)   SpO2 97%   BMI 25.54 kg/m²   Medications   sodium chloride 0.9 % flush 10 mL (has no administration in time range)   sodium chloride 0.9 % flush 10 mL (has no administration in time range)   sodium chloride 0.9 % flush 10 mL (has no administration in time range)   sodium chloride 0.9 % infusion 40 mL (has no administration in time range)   sennosides-docusate (PERICOLACE) 8.6-50 MG per tablet 2 tablet (has no administration in time range)     And   polyethylene glycol (MIRALAX) packet 17 g (has no administration in time range)     And   bisacodyl (DULCOLAX) EC tablet 5 mg (has no administration in time range)     And   bisacodyl (DULCOLAX) suppository 10 mg (has no administration in time range)   bacitracin ointment 0.9 g (0.9 g Topical Given 11/26/24 1625)   magnesium sulfate 2g/50 mL (PREMIX) infusion (2 g Intravenous New Bag 11/26/24 1937)       XR Hip With or Without Pelvis 2 - 3 View Left   Final Result   Impression:   Negative for acute osseous abnormality.            Electronically Signed: Damian Keen MD     11/26/2024 " 5:50 PM EST     Workstation ID: [id number]      XR Knee 1 or 2 View Bilateral   Final Result   Impression:   1.Degenerative changes in both knees.   2.No fractures are identified.   3.Soft tissue swelling laterally on the left.            Electronically Signed: Jose Cassidy MD     11/26/2024 4:18 PM EST     Workstation ID: [id number]                                                           Medical Decision Making  Labs: As above  Radiology: XR Hip With or Without Pelvis 2 - 3 View Left   Final Result    Impression: negative      Electronically Signed: Damian Keen MD      11/26/2024 5:50 PM EST      Workstation ID: [id number]     XR Knee 1 or 2 View Bilateral   Final Result    Impression:    1.Degenerative changes in both knees.    2.No fractures are identified.    3.Soft tissue swelling laterally on the left.      Electronically Signed: Jose Cassidy MD      11/26/2024 4:18 PM EST      Workstation ID: [id number]     Disposition/Treatment:  Appropriate PPE was worn during exam and throughout all encounters with the patient.  Patient presents to the ED with reports of a fall earlier today.  Initially was complaining of some bilateral knee pain that was his only complaint later and ED stay patient's daughter came states he is actually been very weak this has been a progressive issue and they believe the weakness caused his fall.  She states he was complaining of some left hip pain to her as well on reassessment he is somewhat tender to palpation along his left hip added on x-ray of hip which showed no acute osseous abnormalities.  Labs were also obtained while in the ED which showed no signs of severe infection or dehydration.  Was found to have hyponatremia which is chronic and unchanged sodium today 132 magnesium was found to be low at 1.3 which was replaced while in the ED.  When we try to ambulate the patient in the ED he was not able to ambulate well not secondary to pain but due to weakness because of this patient  will be placed in observation unit for further PT OT evaluation in the morning.  Patient and family were in agreement with plan all questions were answered.    Problems Addressed:  Abrasion of knee, bilateral: complicated acute illness or injury  Acute pain of both knees: complicated acute illness or injury  Fall, initial encounter: complicated acute illness or injury  Left hip pain: complicated acute illness or injury  Weakness: complicated acute illness or injury    Amount and/or Complexity of Data Reviewed  Labs: ordered.  Radiology: ordered.    Risk  OTC drugs.  Prescription drug management.  Decision regarding hospitalization.        Final diagnoses:   Weakness   Fall, initial encounter   Abrasion of knee, bilateral   Acute pain of both knees   Left hip pain       ED Disposition  ED Disposition       ED Disposition   Decision to Admit    Condition   --    Comment   --               No follow-up provider specified.       Medication List      No changes were made to your prescriptions during this visit.            Lacey Dela Cruz PA  11/26/24 2051      Electronically signed by Chuy Rick MD at 11/26/24 2123       Operative/Procedure Notes (last 48 hours)  Notes from 11/25/24 1748 through 11/27/24 1748   No notes of this type exist for this encounter.       Physician Progress Notes (last 48 hours)  Notes from 11/25/24 1748 through 11/27/24 1748   No notes of this type exist for this encounter.       Consult Notes (last 48 hours)  Notes from 11/25/24 1748 through 11/27/24 1748   No notes of this type exist for this encounter.

## 2024-11-28 LAB
ANION GAP SERPL CALCULATED.3IONS-SCNC: 12.8 MMOL/L (ref 5–15)
BUN SERPL-MCNC: 21 MG/DL (ref 8–23)
BUN/CREAT SERPL: 17.2 (ref 7–25)
CALCIUM SPEC-SCNC: 9.2 MG/DL (ref 8.2–9.6)
CHLORIDE SERPL-SCNC: 94 MMOL/L (ref 98–107)
CO2 SERPL-SCNC: 25.2 MMOL/L (ref 22–29)
CREAT SERPL-MCNC: 1.22 MG/DL (ref 0.76–1.27)
EGFRCR SERPLBLD CKD-EPI 2021: 54.3 ML/MIN/1.73
GLUCOSE BLDC GLUCOMTR-MCNC: 163 MG/DL (ref 70–105)
GLUCOSE BLDC GLUCOMTR-MCNC: 223 MG/DL (ref 70–105)
GLUCOSE BLDC GLUCOMTR-MCNC: 225 MG/DL (ref 70–105)
GLUCOSE BLDC GLUCOMTR-MCNC: 239 MG/DL (ref 70–105)
GLUCOSE SERPL-MCNC: 136 MG/DL (ref 65–99)
MAGNESIUM SERPL-MCNC: 1.6 MG/DL (ref 1.7–2.3)
POTASSIUM SERPL-SCNC: 3.6 MMOL/L (ref 3.5–5.2)
SODIUM SERPL-SCNC: 132 MMOL/L (ref 136–145)
WHOLE BLOOD HOLD SPECIMEN: NORMAL

## 2024-11-28 PROCEDURE — 63710000001 INSULIN LISPRO (HUMAN) PER 5 UNITS: Performed by: PHYSICIAN ASSISTANT

## 2024-11-28 PROCEDURE — 82948 REAGENT STRIP/BLOOD GLUCOSE: CPT

## 2024-11-28 PROCEDURE — 83735 ASSAY OF MAGNESIUM: CPT

## 2024-11-28 PROCEDURE — 82948 REAGENT STRIP/BLOOD GLUCOSE: CPT | Performed by: PHYSICIAN ASSISTANT

## 2024-11-28 PROCEDURE — 80048 BASIC METABOLIC PNL TOTAL CA: CPT | Performed by: INTERNAL MEDICINE

## 2024-11-28 RX ORDER — FUROSEMIDE 20 MG/1
20 TABLET ORAL EVERY OTHER DAY
Status: DISCONTINUED | OUTPATIENT
Start: 2024-11-28 | End: 2024-12-03

## 2024-11-28 RX ORDER — FUROSEMIDE 20 MG/1
20 TABLET ORAL DAILY
Status: DISCONTINUED | OUTPATIENT
Start: 2024-11-28 | End: 2024-11-28

## 2024-11-28 RX ADMIN — APIXABAN 5 MG: 5 TABLET, FILM COATED ORAL at 20:45

## 2024-11-28 RX ADMIN — PIOGLITAZONE 30 MG: 30 TABLET ORAL at 08:59

## 2024-11-28 RX ADMIN — AMIODARONE HYDROCHLORIDE 200 MG: 200 TABLET ORAL at 09:00

## 2024-11-28 RX ADMIN — INSULIN LISPRO 3 UNITS: 100 INJECTION, SOLUTION INTRAVENOUS; SUBCUTANEOUS at 11:47

## 2024-11-28 RX ADMIN — INSULIN LISPRO 3 UNITS: 100 INJECTION, SOLUTION INTRAVENOUS; SUBCUTANEOUS at 17:50

## 2024-11-28 RX ADMIN — PROPRANOLOL HYDROCHLORIDE 80 MG: 20 TABLET ORAL at 08:59

## 2024-11-28 RX ADMIN — LOSARTAN POTASSIUM 100 MG: 50 TABLET, FILM COATED ORAL at 09:00

## 2024-11-28 RX ADMIN — ATORVASTATIN CALCIUM 10 MG: 10 TABLET ORAL at 09:00

## 2024-11-28 RX ADMIN — INSULIN LISPRO 3 UNITS: 100 INJECTION, SOLUTION INTRAVENOUS; SUBCUTANEOUS at 20:45

## 2024-11-28 RX ADMIN — APIXABAN 5 MG: 5 TABLET, FILM COATED ORAL at 09:00

## 2024-11-28 RX ADMIN — TAMSULOSIN HYDROCHLORIDE 0.4 MG: 0.4 CAPSULE ORAL at 09:00

## 2024-11-28 RX ADMIN — FUROSEMIDE 20 MG: 20 TABLET ORAL at 11:47

## 2024-11-28 RX ADMIN — INSULIN LISPRO 2 UNITS: 100 INJECTION, SOLUTION INTRAVENOUS; SUBCUTANEOUS at 07:23

## 2024-11-28 NOTE — NURSING NOTE
PT more restless, kept getting out of bed, pt wobbly and high falls risk, pt pulled out IV, bed linen changed, tried to contact the daughters to notify them that one on one sitter is necessary for safety, there was no answer, left voicemail. Refused heart monitor, Tae CNLINH sitting with patient.

## 2024-11-28 NOTE — CASE MANAGEMENT/SOCIAL WORK
Continued Stay Note  HAJA Crane     Patient Name: Ap Roe  MRN: [id number]  Today's Date: 11/28/2024    Admit Date: 11/26/2024    Plan: DC PLAN: [facility] accepted. Pending Precert (started per [facility]) No PASRR required. May need transport at discharge.       Discharge Plan       Row Name 11/28/24 0903       Plan    Plan DC PLAN: [facility] accepted. Pending Precert (started per [facility]) No PASRR required. May need transport at discharge.        Plan Comments Reached out to Eliseo with [facility] at [phone]. Precert is still pending.                    Expected Discharge Date and Time       Expected Discharge Date Expected Discharge Time    Nov 29, 2024           Kenna Shen RN   Case Management  [phone]

## 2024-11-28 NOTE — PROGRESS NOTES
[facility] MEDICINE SERVICE  DAILY PROGRESS NOTE    NAME: Ap Roe  : 1/15/1928  MRN: [id number]      LOS: 1 day     PROVIDER OF SERVICE: DOMONIQUE Yin    Chief Complaint: Weakness    Subjective:     Interval History:  History taken from: patient    Patient appears comfortable, asleep in recliner. Family at bedside.         Review of Systems:   Review of Systems   Respiratory:  Negative for shortness of breath.    Cardiovascular:  Negative for chest pain.   Neurological:  Negative for dizziness and headaches.       Objective:     Vital Signs  Temp:  [97.4 °F (36.3 °C)-98.8 °F (37.1 °C)] 97.6 °F (36.4 °C)  Heart Rate:  [60-63] 61  Resp:  [14-19] 19  BP: (128-166)/(56-81) 134/56   Body mass index is 28.59 kg/m².    Physical Exam  Physical Exam  General: 89 yo male, asleep, well nourished, no acute distress.  HENT: Normocephalic, normal hearing, moist oral mucosa, no scleral icterus.  Neck: Supple, non-tender, no carotid bruits, no JVD, no LAD.  Lungs: non-labored respiration.  Heart: RRR.  Abdomen: Soft, non-distended  Musculoskeletal: Normal range of motion and strength, no tenderness or swelling.  Skin: Skin is warm, dry and pink, no rashes or lesions.  Psychiatric: Cooperative, appropriate mood and affect.       Diagnostic Data    Results from last 7 days   Lab Units 24  0554 24  0336   WBC 10*3/mm3  --  5.94   HEMOGLOBIN g/dL  --  10.8*   HEMATOCRIT %  --  32.6*   PLATELETS 10*3/mm3  --  183   GLUCOSE mg/dL 136* 173*   CREATININE mg/dL 1.22 1.26   BUN mg/dL 21 21   SODIUM mmol/L 132* 130*   POTASSIUM mmol/L 3.6 3.6   AST (SGOT) U/L  --  29   ALT (SGPT) U/L  --  16   ALK PHOS U/L  --  85   BILIRUBIN mg/dL  --  0.8   ANION GAP mmol/L 12.8 11.4       XR Hip With or Without Pelvis 2 - 3 View Left    Result Date: 2024  Impression: Negative for acute osseous abnormality. Electronically Signed: Damian Keen MD  2024 5:50 PM EST  Workstation ID: [id number]    XR Knee 1 or 2  View Bilateral    Result Date: 11/26/2024  Impression: 1.Degenerative changes in both knees. 2.No fractures are identified. 3.Soft tissue swelling laterally on the left. Electronically Signed: Jose Cassidy MD  11/26/2024 4:18 PM EST  Workstation ID: [id number]       I reviewed the patient's new clinical results.    Assessment/Plan:     Active and Resolved Problems  Active Hospital Problems    Diagnosis  POA    **Weakness [R53.1]  Yes      Resolved Hospital Problems   No resolved problems to display.       Fall   Weakness  - X-ray of knee showed degenerative changes with no fractures and soft tissues swelling on the left  - X-ray of hip showed no acute abnormality  - fall precautions  - PT/OT consulted recommending inpatient rehab; PT/OT to continue working with patient  - Pre-cert for [facility] pending  - Patient was confused last night, required sitter to be at bedside. Family at bedside today, no sitter present.      Hypomagnesemia  - Magnesium: 1.3 on admit   - Mag sulfate given in the ED  - Repeat 1.6  - Family desires patient to be started on oral magnesium supplement, this is ordered as a patient supplied medication     Hyponatremia   - appears chronic   - Sodium: 132, 130 at baseline   - Monitor while admitted     Diabetes mellitus  -   - Hold metformin  - Continue Actos  - SSI ordered  - Diabetic diet     Anemia   - Hgb 10.8   - No overt s/sx of bleeding   - Continue to monitor CBC  - Transfuse if Hgb < 7  - Will do CBC in Am to continue to monitor     Atrial fibrillation  -Continue amiodarone, propranolol and Eliquis     Hypertension  - BP stable  - continue losartan    VTE Prophylaxis:  Pharmacologic & mechanical VTE prophylaxis orders are present.             Disposition Planning:     Barriers to Discharge:pre-cert pending, sitter free 24h  Anticipated Date of Discharge: 12/2/2024  Place of Discharge: [facility]      Time: 35 minutes     Code Status and Medical Interventions: CPR (Attempt to  Resuscitate); Full Support   Ordered at: 11/26/24 1906     Code Status (Patient has no pulse and is not breathing):    CPR (Attempt to Resuscitate)     Medical Interventions (Patient has pulse or is breathing):    Full Support       Signature: Electronically signed by DOMONIQUE Yin, 11/28/24, 09:54 EST.  [facility] Hospitalist Team     [Negative] : Heme/Lymph [As Noted in HPI] : as noted in HPI

## 2024-11-28 NOTE — CASE MANAGEMENT/SOCIAL WORK
Continued Stay Note  HAJA Crane     Patient Name: Ap Roe  MRN: [id number]  Today's Date: 11/28/2024    Admit Date: 11/26/2024    Plan: DC PLAN: Referral to [facility]. Will need Precert/ Will need PASRR. May need transport at Discharge.     Discharge Plan       Row Name 11/28/24 1531       Plan    Plan DC PLAN: Referral to [facility]. [facility]. Will need Precert/ Will need PASRR. May need transport at Discharge.        Patient/Family in Agreement with Plan yes    Plan Comments Recieved message that patient  no longer wants [facility]. Went and talked with patient and daughter at bedside, SNF list provided. DCP reports sent to [facility] and [facility] per patient family request. Messages sent to liasions, awaiting responses. Will need precert, will need PASRR.  [facility] updated.                      Expected Discharge Date and Time       Expected Discharge Date Expected Discharge Time    Nov 29, 2024               Kenna Shen RN    Case Management  [phone]

## 2024-11-28 NOTE — PLAN OF CARE
Goal Outcome Evaluation:  Plan of Care Reviewed With: patient, spouse, family   Alert with confusion to time and situation. Family remains at bedside. Plan awaiting placement     Progress: improving

## 2024-11-29 LAB
ANION GAP SERPL CALCULATED.3IONS-SCNC: 10.4 MMOL/L (ref 5–15)
BILIRUB UR QL STRIP: NEGATIVE
BUN SERPL-MCNC: 24 MG/DL (ref 8–23)
BUN/CREAT SERPL: 17.4 (ref 7–25)
CALCIUM SPEC-SCNC: 8.9 MG/DL (ref 8.2–9.6)
CHLORIDE SERPL-SCNC: 94 MMOL/L (ref 98–107)
CLARITY UR: CLEAR
CO2 SERPL-SCNC: 24.6 MMOL/L (ref 22–29)
COLOR UR: YELLOW
CREAT SERPL-MCNC: 1.38 MG/DL (ref 0.76–1.27)
DEPRECATED RDW RBC AUTO: 47 FL (ref 37–54)
EGFRCR SERPLBLD CKD-EPI 2021: 46.8 ML/MIN/1.73
ERYTHROCYTE [DISTWIDTH] IN BLOOD BY AUTOMATED COUNT: 14 % (ref 12.3–15.4)
GLUCOSE BLDC GLUCOMTR-MCNC: 201 MG/DL (ref 70–105)
GLUCOSE BLDC GLUCOMTR-MCNC: 269 MG/DL (ref 70–105)
GLUCOSE BLDC GLUCOMTR-MCNC: 287 MG/DL (ref 70–105)
GLUCOSE SERPL-MCNC: 108 MG/DL (ref 65–99)
GLUCOSE UR STRIP-MCNC: ABNORMAL MG/DL
HCT VFR BLD AUTO: 30.7 % (ref 37.5–51)
HGB BLD-MCNC: 10.3 G/DL (ref 13–17.7)
HGB UR QL STRIP.AUTO: NEGATIVE
KETONES UR QL STRIP: ABNORMAL
LEUKOCYTE ESTERASE UR QL STRIP.AUTO: NEGATIVE
MAGNESIUM SERPL-MCNC: 1.7 MG/DL (ref 1.7–2.3)
MCH RBC QN AUTO: 30.7 PG (ref 26.6–33)
MCHC RBC AUTO-ENTMCNC: 33.6 G/DL (ref 31.5–35.7)
MCV RBC AUTO: 91.6 FL (ref 79–97)
NITRITE UR QL STRIP: NEGATIVE
PH UR STRIP.AUTO: 6 [PH] (ref 5–8)
PLATELET # BLD AUTO: 200 10*3/MM3 (ref 140–450)
PMV BLD AUTO: 8.1 FL (ref 6–12)
POTASSIUM SERPL-SCNC: 4.1 MMOL/L (ref 3.5–5.2)
PROT UR QL STRIP: NEGATIVE
RBC # BLD AUTO: 3.35 10*6/MM3 (ref 4.14–5.8)
SODIUM SERPL-SCNC: 129 MMOL/L (ref 136–145)
SP GR UR STRIP: 1.02 (ref 1–1.03)
TSH SERPL DL<=0.05 MIU/L-ACNC: 4.73 UIU/ML (ref 0.27–4.2)
UROBILINOGEN UR QL STRIP: ABNORMAL
WBC NRBC COR # BLD AUTO: 5.09 10*3/MM3 (ref 3.4–10.8)

## 2024-11-29 PROCEDURE — 97116 GAIT TRAINING THERAPY: CPT

## 2024-11-29 PROCEDURE — 63710000001 INSULIN LISPRO (HUMAN) PER 5 UNITS: Performed by: PHYSICIAN ASSISTANT

## 2024-11-29 PROCEDURE — 85027 COMPLETE CBC AUTOMATED: CPT

## 2024-11-29 PROCEDURE — 84443 ASSAY THYROID STIM HORMONE: CPT | Performed by: FAMILY MEDICINE

## 2024-11-29 PROCEDURE — 97535 SELF CARE MNGMENT TRAINING: CPT

## 2024-11-29 PROCEDURE — 81003 URINALYSIS AUTO W/O SCOPE: CPT | Performed by: FAMILY MEDICINE

## 2024-11-29 PROCEDURE — 83735 ASSAY OF MAGNESIUM: CPT

## 2024-11-29 PROCEDURE — 82948 REAGENT STRIP/BLOOD GLUCOSE: CPT | Performed by: PHYSICIAN ASSISTANT

## 2024-11-29 PROCEDURE — 82948 REAGENT STRIP/BLOOD GLUCOSE: CPT

## 2024-11-29 PROCEDURE — 97530 THERAPEUTIC ACTIVITIES: CPT

## 2024-11-29 PROCEDURE — 82607 VITAMIN B-12: CPT | Performed by: FAMILY MEDICINE

## 2024-11-29 PROCEDURE — 80048 BASIC METABOLIC PNL TOTAL CA: CPT

## 2024-11-29 RX ADMIN — INSULIN LISPRO 3 UNITS: 100 INJECTION, SOLUTION INTRAVENOUS; SUBCUTANEOUS at 21:07

## 2024-11-29 RX ADMIN — PROPRANOLOL HYDROCHLORIDE 80 MG: 20 TABLET ORAL at 09:11

## 2024-11-29 RX ADMIN — AMIODARONE HYDROCHLORIDE 200 MG: 200 TABLET ORAL at 09:11

## 2024-11-29 RX ADMIN — INSULIN LISPRO 4 UNITS: 100 INJECTION, SOLUTION INTRAVENOUS; SUBCUTANEOUS at 18:28

## 2024-11-29 RX ADMIN — APIXABAN 5 MG: 5 TABLET, FILM COATED ORAL at 21:07

## 2024-11-29 RX ADMIN — PIOGLITAZONE 30 MG: 30 TABLET ORAL at 09:11

## 2024-11-29 RX ADMIN — TAMSULOSIN HYDROCHLORIDE 0.4 MG: 0.4 CAPSULE ORAL at 09:11

## 2024-11-29 RX ADMIN — ATORVASTATIN CALCIUM 10 MG: 10 TABLET ORAL at 09:11

## 2024-11-29 RX ADMIN — INSULIN LISPRO 4 UNITS: 100 INJECTION, SOLUTION INTRAVENOUS; SUBCUTANEOUS at 12:44

## 2024-11-29 RX ADMIN — APIXABAN 5 MG: 5 TABLET, FILM COATED ORAL at 09:11

## 2024-11-29 NOTE — THERAPY TREATMENT NOTE
Subjective: Pt agreeable to therapeutic plan of care.  Cognition: arousal/alertness: Alert and Attentive    Objective:     Precautions - High Fall Risk    Bed Mobility: N/A or Not attempted. Pt standing with family member and nursing staff upon arrival from chair, returned to chair end of treatment session.   Functional Transfers: CGA and Min-A with FWW to come to standing from toilet  Balance: with UE support and standing CGA and with rolling walker  Functional Ambulation: CGA and with rolling walker, requires increased time    Toileting: SBA and CGA   ADL Position: supported sitting  ADL Comments: toileting    Lower Body Dressing: Min-A  ADL Position: supported standing  ADL Comments: doff/don brief    Vitals: WNL    Pain: 0 VAS  Location: N/A  Interventions for pain: N/A  Education: Provided education on the importance of mobility in the acute care setting, Verbal/Tactile Cues, and ADL training    Assessment: Ap Roe presents with ADL impairments affecting function including balance, coordination, endurance / activity tolerance, range of motion (ROM), and strength. Pt motivated to engage in therapy session this date. Of note, pt [place] and wears hearing aids. Requires increased time to complete ADL tasks this date with significant verbal cues for task sequencing to doff brief and handwashing during toileting tasks. Demonstrated functioning below baseline abilities indicate the need for continued skilled intervention while inpatient. Tolerating session today without incident. Will continue to follow and progress as tolerated.     Plan/Recommendations:   High Intensity Therapy recommended post-acute care. This is recommended as therapy feels the patient would require 5-6 days per week, 2-3 hours per day. At this time, inpatient rehabilitation (acute rehab) would be the first choice and SNF would be second.    Pt desires Inpatient Rehabilitation placement at discharge. Pt cooperative; agreeable to therapeutic  recommendations and plan of care.     Modified [place]: N/A = No pre-op stroke/TIA    Post-Tx Position: Up in Chair, Alarms activated, and Call light and personal items within reach, family member in room.  PPE: gloves    Therapy Charges for Today       Code Description Service Date Service Provider Modifiers Qty    02640592747 HC OT SELF CARE/MGMT/TRAIN EA 15 MIN 11/29/2024 Wolf Andino OT GO 1    98919290022 HC OT THERAPEUTIC ACT EA 15 MIN 11/29/2024 Wolf Andino OT GO 1           Time Calculation- OT       Row Name 11/29/24 1529             Time Calculation- OT    OT Start Time 1421  -SP      OT Stop Time 1441  -SP      OT Time Calculation (min) 20 min  -SP      Total Timed Code Minutes- OT 20 minute(s)  -SP      OT Received On 11/29/24  -SP      OT - Next Appointment 12/02/24  -SP                User Key  (r) = Recorded By, (t) = Taken By, (c) = Cosigned By      Initials Name Provider Type    SP Wolf Andino, OT Occupational Therapist

## 2024-11-29 NOTE — THERAPY TREATMENT NOTE
Subjective: Pt agreeable to therapeutic plan of care.    Objective:     Precautions -  fall risk    Bed mobility - N/A or Not attempted.  Pt was standing in front of the chair with nursing and family.  Pt returned to chair after tx session  Transfers - CGA, Min-A, and with rolling walker sit to stand from toilet.   Pt needed cues for hand placement.   Ambulation - 30 + 30 feet CGA and with rolling walker  brief rest required before amb again.  Very slow mel, short step length and fair foot clearance    Vitals: WNL    Pain: 0 VAS      Education: Provided education on the importance of mobility in the acute care setting, Verbal/Tactile Cues, Transfer Training, and Gait Training    Assessment: Ap Roe presents with functional mobility impairments which indicate the need for skilled intervention. Tolerating session today without incident. Pt has made some progress with his mobility today.  Pt is very cooperative has good potential to recover his mobility with continued physical therapy. Will continue to follow and progress as tolerated.     Plan/Recommendations:   If medically appropriate, High Intensity Therapy recommended post-acute care. This is recommended as therapy feels the patient would require 5-6 days per week, 2-3 hours per day. At this time, inpatient rehabilitation  would be recommended.  Pt requires no DME at discharge.     Pt desires Inpatient Rehabilitation placement at discharge. Pt cooperative; agreeable to therapeutic recommendations and plan of care.     Basic Mobility 6-click:  Rollin = Total, A lot = 2, A little = 3; 4 = None  Supine>Sit:   1 = Total, A lot = 2, A little = 3; 4 = None   Sit>Stand with arms:  1 = Total, A lot = 2, A little = 3; 4 = None  Bed>Chair:   1 = Total, A lot = 2, A little = 3; 4 = None  Ambulate in room:  1 = Total, A lot = 2, A little = 3; 4 = None  3-5 Steps with railin = Total, A lot = 2, A little = 3; 4 = None  Score: 16    Modified [place]: 4  = Moderately severe disability (Unable to attend to own bodily needs without assistance, and unable to walk unassisted)     Post-Tx Position: Up in Chair, Alarms activated, and Call light and personal items within reach  PPE: gloves    Therapy Charges for Today       Code Description Service Date Service Provider Modifiers Qty    17011641181  GAIT TRAINING EA 15 MIN 11/29/2024 Elva Mcdonald, JOÃO GP 1    [id number]  PT THERAPEUTIC ACT EA 15 MIN 11/29/2024 Elva Mcdonald PTA GP 1           PT Charges       Row Name 11/29/24 1446             Time Calculation    Start Time 1421  -SC      Stop Time 1441  -SC      Time Calculation (min) 20 min  -SC      PT Received On 11/29/24  -SC      PT - Next Appointment 12/01/24  -SC         Time Calculation- PT    Total Timed Code Minutes- PT 20 minute(s)  -SC                User Key  (r) = Recorded By, (t) = Taken By, (c) = Cosigned By      Initials Name Provider Type    SC Elva Mcdonald PTA Physical Therapist Assistant

## 2024-11-29 NOTE — CASE MANAGEMENT/SOCIAL WORK
Continued Stay Note  HAJA Crane     Patient Name: Ap Roe  MRN: [id number]  Today's Date: 11/29/2024    Admit Date: 11/26/2024    Plan: [facility] accepted, Precert pending, PASRR approved   Discharge Plan       Row Name 11/29/24 1140       Plan    Plan [facility] accepted, Precert pending, PASRR approved    Plan Comments Met with patient, daughter and wife at bedside after they spoke with doctor. Family has decided to Stick with [facility] rehab after much discussion. Confirmed with Liaison Deborah at [facility] that patient Precert with [employer] is still pending. Daughter is also going to call insurance to see if they can get it to go through. After Speaking with Deborah at [facility] [employer] Precert department is closed today for the holiday.                      Erica Sahu RN

## 2024-11-29 NOTE — PROGRESS NOTES
[facility] MEDICINE SERVICE  DAILY PROGRESS NOTE    NAME: Ap Roe  : 1/15/1928  MRN: [id number]      LOS: 2 days     PROVIDER OF SERVICE: Twyla Johnson MD    Chief Complaint: Weakness    Subjective:     Interval History:  History taken from: patient    Patient otherwise appears comfortable.  He is confused this morning.  Wife as well as daughters are present at bedside.  No physical aggression noted.  Patient did have trouble sleeping overnight.        Review of Systems:   Review of Systems   Respiratory: Negative.     Cardiovascular: Negative.    Gastrointestinal: Negative.    Musculoskeletal: Negative.    Neurological:  Positive for weakness.   Psychiatric/Behavioral:  Positive for behavioral problems and confusion. The patient is nervous/anxious.        Objective:     Vital Signs  Temp:  [97.6 °F (36.4 °C)-98.3 °F (36.8 °C)] 98.3 °F (36.8 °C)  Heart Rate:  [60-82] 63  Resp:  [15-22] 17  BP: (107-171)/(48-76) 171/72   Body mass index is 28.59 kg/m².    Physical Exam  Physical Exam  Constitutional:       General: He is awake.      Appearance: Normal appearance. He is well-developed and well-groomed.   HENT:      Head: Normocephalic and atraumatic.      Nose: Nose normal.      Mouth/Throat:      Mouth: Mucous membranes are moist.      Pharynx: Oropharynx is clear.   Eyes:      Extraocular Movements: Extraocular movements intact.      Conjunctiva/sclera: Conjunctivae normal.      Pupils: Pupils are equal, round, and reactive to light.   Cardiovascular:      Rate and Rhythm: Normal rate and regular rhythm.      Pulses: Normal pulses.      Heart sounds: Normal heart sounds.   Pulmonary:      Effort: Pulmonary effort is normal.      Breath sounds: Normal breath sounds.   Abdominal:      General: Abdomen is flat. Bowel sounds are normal.      Palpations: Abdomen is soft.   Musculoskeletal:         General: Normal range of motion.      Cervical back: Normal range of motion and neck supple.      Right  lower leg: Edema present.      Left lower leg: Edema present.   Skin:     General: Skin is warm and dry.   Neurological:      General: No focal deficit present.      Mental Status: He is alert. Mental status is at baseline. He is disoriented.      Cranial Nerves: Cranial nerves 2-12 are intact.      Sensory: Sensation is intact.      Motor: Motor function is intact.   Psychiatric:         Mood and Affect: Mood normal.         Behavior: Behavior normal. Behavior is cooperative.            Diagnostic Data    Results from last 7 days   Lab Units 11/29/24  0044 11/28/24  0554 11/27/24  0336   WBC 10*3/mm3 5.09  --  5.94   HEMOGLOBIN g/dL 10.3*  --  10.8*   HEMATOCRIT % 30.7*  --  32.6*   PLATELETS 10*3/mm3 200  --  183   GLUCOSE mg/dL 108*   < > 173*   CREATININE mg/dL 1.38*   < > 1.26   BUN mg/dL 24*   < > 21   SODIUM mmol/L 129*   < > 130*   POTASSIUM mmol/L 4.1   < > 3.6   AST (SGOT) U/L  --   --  29   ALT (SGPT) U/L  --   --  16   ALK PHOS U/L  --   --  85   BILIRUBIN mg/dL  --   --  0.8   ANION GAP mmol/L 10.4   < > 11.4    < > = values in this interval not displayed.       No radiology results for the last day      I reviewed the patient's new clinical results.    Assessment/Plan:     Active and Resolved Problems  Active Hospital Problems    Diagnosis  POA    **Weakness [R53.1]  Yes      Resolved Hospital Problems   No resolved problems to display.     Status post fall at home  Generalized weakness  Hypomagnesemia   Diabetes mellitus  Anemia of chronic disease  Atrial fibrillation  Hypertension  Acute confusional state    Long discussion with patient as well as family members present at bedside.  Patient with increased confusion this morning.  Noted patient had significant sleep deprivation overnight and was unable to sleep well.  Will check urinalysis to rule out infection.  Noted white count normal.  Noted patient afebrile as well.  Patient denies any other respiratory symptoms at the present time.  At baseline  as per family members patient is alert and orient x 4 it is able to ambulate without use of assistive device.  If urine analysis is negative would trial low-dose trazodone to see if it would help with sleep while inpatient at the hospital.  Insurance currently pending approval for disposition to acute rehab facility.  Referral has been made for [facility].    Noted creatinine as well as sodium level.  Sodium level slowly dropping.  Will hold Lasix for now.  Recheck a.m. labs.  If further decline in sodium level may require nephrology evaluation.    If Insurance denies disposition to acute rehab facility family members are okay with the idea of patient being discharged home.  Patient has significant support at home as well as accessibility to outpatient labs for appropriate follow-up.    VTE Prophylaxis:  Pharmacologic & mechanical VTE prophylaxis orders are present.             Disposition Planning:     Barriers to Discharge: Insurance approval  Anticipated Date of Discharge: 11/30/2024  Place of Discharge: Rehab versus home pending progress and insurance approval      Time: 40 minutes     Code Status and Medical Interventions: No CPR (Do Not Attempt to Resuscitate); Full Support   Ordered at: 11/28/24 1024     Level Of Support Discussed With:    Next of Kin (If No Surrogate)     Code Status (Patient has no pulse and is not breathing):    No CPR (Do Not Attempt to Resuscitate)     Medical Interventions (Patient has pulse or is breathing):    Full Support       Signature: Electronically signed by Twyla Johnson MD, 11/29/24, 10:12 EST.  [facility] Hospitalist Team

## 2024-11-29 NOTE — PLAN OF CARE
Problem: Adult Inpatient Plan of Care  Goal: Plan of Care Review  Outcome: Progressing  Flowsheets (Taken 11/29/2024 0208)  Progress: improving  Outcome Evaluation: Patient transfering x1 assist with gait belt. Wife to be at bedside this shift. Patient calm and cooperative. Bed is in low position, call light is in reach, and bed alarm is active. Patient planning to DC to inpatient rehab.  Plan of Care Reviewed With:   patient   spouse  Goal: Patient-Specific Goal (Individualized)  Outcome: Progressing  Goal: Absence of Hospital-Acquired Illness or Injury  Outcome: Progressing  Intervention: Identify and Manage Fall Risk  Recent Flowsheet Documentation  Taken 11/29/2024 0045 by Carla Cat, RN  Safety Promotion/Fall Prevention: safety round/check completed  Taken 11/28/2024 2200 by Carla Cat RN  Safety Promotion/Fall Prevention: safety round/check completed  Taken 11/28/2024 2018 by Carla Cat RN  Safety Promotion/Fall Prevention: safety round/check completed  Intervention: Prevent Skin Injury  Recent Flowsheet Documentation  Taken 11/28/2024 2018 by Carla Cat, RN  Body Position: position changed independently  Skin Protection: transparent dressing maintained  Intervention: Prevent Infection  Recent Flowsheet Documentation  Taken 11/29/2024 0045 by Carla Cat, RN  Infection Prevention: hand hygiene promoted  Taken 11/28/2024 2200 by Carla Cat RN  Infection Prevention: rest/sleep promoted  Taken 11/28/2024 2018 by Carla Cat RN  Infection Prevention: rest/sleep promoted  Goal: Optimal Comfort and Wellbeing  Outcome: Progressing  Intervention: Provide Person-Centered Care  Recent Flowsheet Documentation  Taken 11/28/2024 2018 by Carla Cat RN  Trust Relationship/Rapport:   questions answered   questions encouraged  Goal: Readiness for Transition of Care  Outcome: Progressing   Goal Outcome Evaluation:  Plan of Care Reviewed With: patient, spouse        Progress:  improving  Outcome Evaluation: Patient transfering x1 assist with gait belt. Wife to be at bedside this shift. Patient calm and cooperative. Bed is in low position, call light is in reach, and bed alarm is active. Patient planning to DC to inpatient rehab.

## 2024-11-29 NOTE — NURSING NOTE
"I followed up with the patient and he still refused us checking his blood sugar and vitals stating \"That is exactly right.\" Patients wife stated \"He just had his blood sugar checked at 6am by a young girl.\" I could find no evidence in the patients chart.  "

## 2024-11-29 NOTE — PLAN OF CARE
Goal Outcome Evaluation:     Bed mobility - N/A or Not attempted.  Pt was standing in front of the chair with nursing and family.  Pt returned to chair after tx session  Transfers - CGA, Min-A, and with rolling walker sit to stand from toilet.   Pt needed cues for hand placement.   Ambulation - 30 + 30 feet CGA and with rolling walker  brief rest required before amb again.  Very slow mel, short step length and fair foot clearance         If medically appropriate, High Intensity Therapy recommended post-acute care. This is recommended as therapy feels the patient would require 5-6 days per week, 2-3 hours per day. At this time, inpatient rehabilitation  would be recommended.  Pt requires no DME at discharge.     Pt desires Inpatient Rehabilitation placement at discharge. Pt cooperative; agreeable to therapeutic recommendations and plan of care.

## 2024-11-29 NOTE — PLAN OF CARE
Assessment: Ap Roe presents with ADL impairments affecting function including balance, coordination, endurance / activity tolerance, range of motion (ROM), and strength. Pt motivated to engage in therapy session this date. Of note, pt [place] and wears hearing aids. Requires increased time to complete ADL tasks this date with significant verbal cues for task sequencing to doff brief and handwashing during toileting tasks. Demonstrated functioning below baseline abilities indicate the need for continued skilled intervention while inpatient. Tolerating session today without incident. Will continue to follow and progress as tolerated.      Plan/Recommendations:   High Intensity Therapy recommended post-acute care. This is recommended as therapy feels the patient would require 5-6 days per week, 2-3 hours per day. At this time, inpatient rehabilitation (acute rehab) would be the first choice and SNF would be second.

## 2024-11-30 LAB
ALBUMIN SERPL-MCNC: 3.3 G/DL (ref 3.5–5.2)
ALBUMIN/GLOB SERPL: 1.4 G/DL
ALP SERPL-CCNC: 75 U/L (ref 39–117)
ALT SERPL W P-5'-P-CCNC: 14 U/L (ref 1–41)
ANION GAP SERPL CALCULATED.3IONS-SCNC: 10.7 MMOL/L (ref 5–15)
AST SERPL-CCNC: 27 U/L (ref 1–40)
BASOPHILS # BLD AUTO: 0.01 10*3/MM3 (ref 0–0.2)
BASOPHILS NFR BLD AUTO: 0.2 % (ref 0–1.5)
BILIRUB SERPL-MCNC: 0.6 MG/DL (ref 0–1.2)
BUN SERPL-MCNC: 25 MG/DL (ref 8–23)
BUN/CREAT SERPL: 21.2 (ref 7–25)
CALCIUM SPEC-SCNC: 8.6 MG/DL (ref 8.2–9.6)
CHLORIDE SERPL-SCNC: 97 MMOL/L (ref 98–107)
CO2 SERPL-SCNC: 22.3 MMOL/L (ref 22–29)
CREAT SERPL-MCNC: 1.18 MG/DL (ref 0.76–1.27)
DEPRECATED RDW RBC AUTO: 48.6 FL (ref 37–54)
EGFRCR SERPLBLD CKD-EPI 2021: 56.5 ML/MIN/1.73
EOSINOPHIL # BLD AUTO: 0.06 10*3/MM3 (ref 0–0.4)
EOSINOPHIL NFR BLD AUTO: 1 % (ref 0.3–6.2)
ERYTHROCYTE [DISTWIDTH] IN BLOOD BY AUTOMATED COUNT: 14.1 % (ref 12.3–15.4)
GLOBULIN UR ELPH-MCNC: 2.3 GM/DL
GLUCOSE BLDC GLUCOMTR-MCNC: 154 MG/DL (ref 70–105)
GLUCOSE BLDC GLUCOMTR-MCNC: 223 MG/DL (ref 70–105)
GLUCOSE BLDC GLUCOMTR-MCNC: 235 MG/DL (ref 70–105)
GLUCOSE BLDC GLUCOMTR-MCNC: 252 MG/DL (ref 70–105)
GLUCOSE SERPL-MCNC: 141 MG/DL (ref 65–99)
HCT VFR BLD AUTO: 29.1 % (ref 37.5–51)
HGB BLD-MCNC: 9.9 G/DL (ref 13–17.7)
IMM GRANULOCYTES # BLD AUTO: 0.03 10*3/MM3 (ref 0–0.05)
IMM GRANULOCYTES NFR BLD AUTO: 0.5 % (ref 0–0.5)
LYMPHOCYTES # BLD AUTO: 1.55 10*3/MM3 (ref 0.7–3.1)
LYMPHOCYTES NFR BLD AUTO: 26.9 % (ref 19.6–45.3)
MAGNESIUM SERPL-MCNC: 1.7 MG/DL (ref 1.7–2.3)
MCH RBC QN AUTO: 31.9 PG (ref 26.6–33)
MCHC RBC AUTO-ENTMCNC: 34 G/DL (ref 31.5–35.7)
MCV RBC AUTO: 93.9 FL (ref 79–97)
MONOCYTES # BLD AUTO: 0.74 10*3/MM3 (ref 0.1–0.9)
MONOCYTES NFR BLD AUTO: 12.8 % (ref 5–12)
NEUTROPHILS NFR BLD AUTO: 3.38 10*3/MM3 (ref 1.7–7)
NEUTROPHILS NFR BLD AUTO: 58.6 % (ref 42.7–76)
NRBC BLD AUTO-RTO: 0 /100 WBC (ref 0–0.2)
PLATELET # BLD AUTO: 164 10*3/MM3 (ref 140–450)
PMV BLD AUTO: 7.8 FL (ref 6–12)
POTASSIUM SERPL-SCNC: 3.9 MMOL/L (ref 3.5–5.2)
PROT SERPL-MCNC: 5.6 G/DL (ref 6–8.5)
RBC # BLD AUTO: 3.1 10*6/MM3 (ref 4.14–5.8)
SODIUM SERPL-SCNC: 130 MMOL/L (ref 136–145)
VIT B12 BLD-MCNC: 414 PG/ML (ref 211–946)
WBC NRBC COR # BLD AUTO: 5.77 10*3/MM3 (ref 3.4–10.8)

## 2024-11-30 PROCEDURE — 82948 REAGENT STRIP/BLOOD GLUCOSE: CPT | Performed by: PHYSICIAN ASSISTANT

## 2024-11-30 PROCEDURE — 83735 ASSAY OF MAGNESIUM: CPT | Performed by: FAMILY MEDICINE

## 2024-11-30 PROCEDURE — 80053 COMPREHEN METABOLIC PANEL: CPT | Performed by: FAMILY MEDICINE

## 2024-11-30 PROCEDURE — 82948 REAGENT STRIP/BLOOD GLUCOSE: CPT

## 2024-11-30 PROCEDURE — 63710000001 INSULIN LISPRO (HUMAN) PER 5 UNITS: Performed by: PHYSICIAN ASSISTANT

## 2024-11-30 PROCEDURE — 85025 COMPLETE CBC W/AUTO DIFF WBC: CPT | Performed by: FAMILY MEDICINE

## 2024-11-30 RX ORDER — TRAZODONE HYDROCHLORIDE 50 MG/1
25 TABLET, FILM COATED ORAL NIGHTLY
Status: DISCONTINUED | OUTPATIENT
Start: 2024-11-30 | End: 2024-12-03

## 2024-11-30 RX ORDER — TRAZODONE HYDROCHLORIDE 50 MG/1
25 TABLET, FILM COATED ORAL NIGHTLY
Status: DISCONTINUED | OUTPATIENT
Start: 2024-11-30 | End: 2024-11-30

## 2024-11-30 RX ADMIN — INSULIN LISPRO 3 UNITS: 100 INJECTION, SOLUTION INTRAVENOUS; SUBCUTANEOUS at 20:16

## 2024-11-30 RX ADMIN — APIXABAN 5 MG: 5 TABLET, FILM COATED ORAL at 08:53

## 2024-11-30 RX ADMIN — APIXABAN 5 MG: 5 TABLET, FILM COATED ORAL at 20:16

## 2024-11-30 RX ADMIN — INSULIN LISPRO 3 UNITS: 100 INJECTION, SOLUTION INTRAVENOUS; SUBCUTANEOUS at 12:09

## 2024-11-30 RX ADMIN — LOSARTAN POTASSIUM 100 MG: 50 TABLET, FILM COATED ORAL at 08:52

## 2024-11-30 RX ADMIN — PROPRANOLOL HYDROCHLORIDE 80 MG: 20 TABLET ORAL at 08:53

## 2024-11-30 RX ADMIN — ATORVASTATIN CALCIUM 10 MG: 10 TABLET ORAL at 08:53

## 2024-11-30 RX ADMIN — INSULIN LISPRO 4 UNITS: 100 INJECTION, SOLUTION INTRAVENOUS; SUBCUTANEOUS at 17:21

## 2024-11-30 RX ADMIN — TAMSULOSIN HYDROCHLORIDE 0.4 MG: 0.4 CAPSULE ORAL at 08:53

## 2024-11-30 RX ADMIN — AMIODARONE HYDROCHLORIDE 200 MG: 200 TABLET ORAL at 08:53

## 2024-11-30 RX ADMIN — PIOGLITAZONE 30 MG: 30 TABLET ORAL at 08:52

## 2024-11-30 RX ADMIN — TRAZODONE HYDROCHLORIDE 25 MG: 50 TABLET ORAL at 20:16

## 2024-11-30 RX ADMIN — Medication 5 MG: at 03:17

## 2024-11-30 NOTE — NURSING NOTE
Pt is refusing to keep on the pulse Ox and cardiac monitor leads, pt is very confused and has to be re oriented about getting up without help. Pt refused nurse to get blood but did say he would give it when he wakes up.

## 2024-11-30 NOTE — PROGRESS NOTES
[facility] MEDICINE SERVICE  DAILY PROGRESS NOTE    NAME: Ap Roe  : 1/15/1928  MRN: [id number]      LOS: 3 days     PROVIDER OF SERVICE: Gus Norton MD    Chief Complaint: Weakness    Subjective:     Interval History:  History taken from: patient    Delirious but improving.  Daughter is at bedside and provides helpful information.      Review of Systems:   Review of Systems   Respiratory: Negative.     Cardiovascular: Negative.    Gastrointestinal: Negative.    Musculoskeletal: Negative.    Neurological:  Positive for weakness.   Psychiatric/Behavioral:  Positive for behavioral problems and confusion. The patient is nervous/anxious.        Objective:     Vital Signs  Temp:  [96.8 °F (36 °C)-97.9 °F (36.6 °C)] 96.8 °F (36 °C)  Heart Rate:  [60-63] 60  Resp:  [14-20] 17  BP: (111-126)/(50-64) 111/56   Body mass index is 28.59 kg/m².    Physical Exam  Physical Exam  Constitutional:       General: He is awake.      Appearance: Normal appearance. He is well-developed and well-groomed.   HENT:      Head: Normocephalic and atraumatic.      Nose: Nose normal.      Mouth/Throat:      Mouth: Mucous membranes are moist.      Pharynx: Oropharynx is clear.   Eyes:      Extraocular Movements: Extraocular movements intact.      Conjunctiva/sclera: Conjunctivae normal.      Pupils: Pupils are equal, round, and reactive to light.   Cardiovascular:      Rate and Rhythm: Normal rate and regular rhythm.      Pulses: Normal pulses.      Heart sounds: Normal heart sounds.   Pulmonary:      Effort: Pulmonary effort is normal.      Breath sounds: Normal breath sounds.   Abdominal:      General: Abdomen is flat. Bowel sounds are normal.      Palpations: Abdomen is soft.   Musculoskeletal:         General: Normal range of motion.      Cervical back: Normal range of motion and neck supple.      Right lower leg: Edema present.      Left lower leg: Edema present.   Skin:     General: Skin is warm and dry.   Neurological:       General: No focal deficit present.      Mental Status: He is alert. Mental status is at baseline. He is disoriented.      Cranial Nerves: Cranial nerves 2-12 are intact.      Sensory: Sensation is intact.      Motor: Motor function is intact.   Psychiatric:         Mood and Affect: Mood normal.         Behavior: Behavior normal. Behavior is cooperative.            Diagnostic Data    Results from last 7 days   Lab Units 11/30/24  0832   WBC 10*3/mm3 5.77   HEMOGLOBIN g/dL 9.9*   HEMATOCRIT % 29.1*   PLATELETS 10*3/mm3 164   GLUCOSE mg/dL 141*   CREATININE mg/dL 1.18   BUN mg/dL 25*   SODIUM mmol/L 130*   POTASSIUM mmol/L 3.9   AST (SGOT) U/L 27   ALT (SGPT) U/L 14   ALK PHOS U/L 75   BILIRUBIN mg/dL 0.6   ANION GAP mmol/L 10.7       No radiology results for the last day      I reviewed the patient's new clinical results.    Assessment/Plan:     Active and Resolved Problems  Active Hospital Problems    Diagnosis  POA    **Weakness [R53.1]  Yes      Resolved Hospital Problems   No resolved problems to display.     Status post fall at home  Generalized weakness  Hypomagnesemia   Diabetes mellitus  Anemia of chronic disease  Atrial fibrillation  Hypertension  Acute confusional state    Patient is quite delirious but improving per his family who is at bedside today.  We are hoping that he will be more awake and interactive later on in the morning at this afternoon.  We are going to schedule a small dose of trazodone for this evening to try to reset his sleep-wake cycles.  His normal self is alert and oriented x 4 he is quite functional at 89 and the goal with his family would be to return home with his 92-year-old wife.  Family is very helpful.    His labs are improving.  Hyponatremia is a chronic issue for him.  He is taking in a good amount of oral intake today which is helpful.  Continue the course.    VTE Prophylaxis:  Pharmacologic & mechanical VTE prophylaxis orders are present.             Disposition  Planning:     Barriers to Discharge: Insurance approval  Anticipated Date of Discharge: 12/1/2024  Place of Discharge: Rehab versus home pending progress and insurance approval      Time: 40 minutes     Code Status and Medical Interventions: No CPR (Do Not Attempt to Resuscitate); Full Support   Ordered at: 11/28/24 1024     Level Of Support Discussed With:    Next of Kin (If No Surrogate)     Code Status (Patient has no pulse and is not breathing):    No CPR (Do Not Attempt to Resuscitate)     Medical Interventions (Patient has pulse or is breathing):    Full Support       Signature: Electronically signed by Gus Norton MD, 11/30/24, 14:00 EST.  [facility] Hospitalist Team

## 2024-11-30 NOTE — PLAN OF CARE
Problem: Adult Inpatient Plan of Care  Goal: Plan of Care Review  Outcome: Progressing  Goal: Patient-Specific Goal (Individualized)  Outcome: Progressing  Goal: Absence of Hospital-Acquired Illness or Injury  Outcome: Progressing  Intervention: Identify and Manage Fall Risk  Recent Flowsheet Documentation  Taken 11/30/2024 1400 by Hermila Abdalla RN  Safety Promotion/Fall Prevention: safety round/check completed  Taken 11/30/2024 1200 by Hermila Abdalla RN  Safety Promotion/Fall Prevention: safety round/check completed  Taken 11/30/2024 1000 by Hermila Abdalla RN  Safety Promotion/Fall Prevention: safety round/check completed  Taken 11/30/2024 0800 by Hermila Abdalla RN  Safety Promotion/Fall Prevention: safety round/check completed  Intervention: Prevent Skin Injury  Recent Flowsheet Documentation  Taken 11/30/2024 0800 by Hermila Abdalla RN  Skin Protection:   pectin skin barriers applied   incontinence pads utilized  Goal: Optimal Comfort and Wellbeing  Outcome: Progressing  Goal: Readiness for Transition of Care  Outcome: Progressing     Problem: Skin Injury Risk Increased  Goal: Skin Health and Integrity  Outcome: Progressing  Intervention: Optimize Skin Protection  Recent Flowsheet Documentation  Taken 11/30/2024 0800 by Hermila Abdalla RN  Skin Protection:   pectin skin barriers applied   incontinence pads utilized     Problem: Fall Injury Risk  Goal: Absence of Fall and Fall-Related Injury  Outcome: Progressing  Intervention: Promote Injury-Free Environment  Recent Flowsheet Documentation  Taken 11/30/2024 1400 by Hermila Abdalla RN  Safety Promotion/Fall Prevention: safety round/check completed  Taken 11/30/2024 1200 by Hermila Abdalla RN  Safety Promotion/Fall Prevention: safety round/check completed  Taken 11/30/2024 1000 by Hermila bAdalla RN  Safety Promotion/Fall Prevention: safety round/check completed  Taken 11/30/2024 0800 by Hermila Abdalla RN  Safety Promotion/Fall Prevention: safety  round/check completed     Problem: Confusion Acute  Goal: Optimal Cognitive Function  Outcome: Progressing   Goal Outcome Evaluation:

## 2024-12-01 LAB
GLUCOSE BLDC GLUCOMTR-MCNC: 162 MG/DL (ref 70–105)
GLUCOSE BLDC GLUCOMTR-MCNC: 200 MG/DL (ref 70–105)
GLUCOSE BLDC GLUCOMTR-MCNC: 273 MG/DL (ref 70–105)
GLUCOSE BLDC GLUCOMTR-MCNC: 274 MG/DL (ref 70–105)

## 2024-12-01 PROCEDURE — 63710000001 INSULIN LISPRO (HUMAN) PER 5 UNITS: Performed by: PHYSICIAN ASSISTANT

## 2024-12-01 PROCEDURE — 97116 GAIT TRAINING THERAPY: CPT

## 2024-12-01 PROCEDURE — 82948 REAGENT STRIP/BLOOD GLUCOSE: CPT | Performed by: PHYSICIAN ASSISTANT

## 2024-12-01 PROCEDURE — 82948 REAGENT STRIP/BLOOD GLUCOSE: CPT

## 2024-12-01 PROCEDURE — 97530 THERAPEUTIC ACTIVITIES: CPT

## 2024-12-01 RX ADMIN — ATORVASTATIN CALCIUM 10 MG: 10 TABLET ORAL at 08:15

## 2024-12-01 RX ADMIN — AMIODARONE HYDROCHLORIDE 200 MG: 200 TABLET ORAL at 08:15

## 2024-12-01 RX ADMIN — INSULIN LISPRO 2 UNITS: 100 INJECTION, SOLUTION INTRAVENOUS; SUBCUTANEOUS at 08:15

## 2024-12-01 RX ADMIN — Medication 5 MG: at 22:08

## 2024-12-01 RX ADMIN — TRAZODONE HYDROCHLORIDE 25 MG: 50 TABLET ORAL at 22:09

## 2024-12-01 RX ADMIN — INSULIN LISPRO 4 UNITS: 100 INJECTION, SOLUTION INTRAVENOUS; SUBCUTANEOUS at 18:04

## 2024-12-01 RX ADMIN — APIXABAN 5 MG: 5 TABLET, FILM COATED ORAL at 08:15

## 2024-12-01 RX ADMIN — TAMSULOSIN HYDROCHLORIDE 0.4 MG: 0.4 CAPSULE ORAL at 08:15

## 2024-12-01 RX ADMIN — LOSARTAN POTASSIUM 100 MG: 50 TABLET, FILM COATED ORAL at 08:15

## 2024-12-01 RX ADMIN — APIXABAN 5 MG: 5 TABLET, FILM COATED ORAL at 22:09

## 2024-12-01 RX ADMIN — INSULIN LISPRO 4 UNITS: 100 INJECTION, SOLUTION INTRAVENOUS; SUBCUTANEOUS at 22:08

## 2024-12-01 RX ADMIN — PIOGLITAZONE 30 MG: 30 TABLET ORAL at 08:42

## 2024-12-01 RX ADMIN — INSULIN LISPRO 3 UNITS: 100 INJECTION, SOLUTION INTRAVENOUS; SUBCUTANEOUS at 11:52

## 2024-12-01 NOTE — PLAN OF CARE
Problem: Adult Inpatient Plan of Care  Goal: Plan of Care Review  Outcome: Progressing  Goal: Patient-Specific Goal (Individualized)  Outcome: Progressing  Goal: Absence of Hospital-Acquired Illness or Injury  Outcome: Progressing  Intervention: Identify and Manage Fall Risk  Recent Flowsheet Documentation  Taken 12/1/2024 1600 by Giselle Coronado RN  Safety Promotion/Fall Prevention: safety round/check completed  Taken 12/1/2024 1400 by Giselle Coronado RN  Safety Promotion/Fall Prevention: safety round/check completed  Taken 12/1/2024 1200 by Giselle Coronado RN  Safety Promotion/Fall Prevention: safety round/check completed  Taken 12/1/2024 1000 by Giselle Coronado RN  Safety Promotion/Fall Prevention: safety round/check completed  Taken 12/1/2024 0800 by Giselle Coronado RN  Safety Promotion/Fall Prevention: safety round/check completed  Goal: Optimal Comfort and Wellbeing  Outcome: Progressing  Goal: Readiness for Transition of Care  Outcome: Progressing     Problem: Skin Injury Risk Increased  Goal: Skin Health and Integrity  Outcome: Progressing     Problem: Fall Injury Risk  Goal: Absence of Fall and Fall-Related Injury  Outcome: Progressing  Intervention: Promote Injury-Free Environment  Recent Flowsheet Documentation  Taken 12/1/2024 1600 by Giselle Coronado RN  Safety Promotion/Fall Prevention: safety round/check completed  Taken 12/1/2024 1400 by Giselle Coronado RN  Safety Promotion/Fall Prevention: safety round/check completed  Taken 12/1/2024 1200 by Giselle Coronado RN  Safety Promotion/Fall Prevention: safety round/check completed  Taken 12/1/2024 1000 by Giselle Coronado RN  Safety Promotion/Fall Prevention: safety round/check completed  Taken 12/1/2024 0800 by Giselle Coronado RN  Safety Promotion/Fall Prevention: safety round/check completed     Problem: Confusion Acute  Goal: Optimal Cognitive Function  Outcome:  Progressing   Goal Outcome Evaluation:

## 2024-12-01 NOTE — PLAN OF CARE
Goal Outcome Evaluation:      No new concerns overnight. Wife remains at bedside. No c/o discomfort this evening. No s/s distress. 1 assist for transfers.  Call light in reach.   Refusing to were telemetry at this time.

## 2024-12-01 NOTE — PLAN OF CARE
Assessment: Ap Roe presents with functional mobility impairments which indicate the need for skilled intervention. Tolerating session today without incident. Patient did better than expected and was able to amb in room and get into chair with CGA.Wife very encouraging and supportive. Plans on acute rehab at [place].Will continue to follow and progress as tolerated.

## 2024-12-01 NOTE — PROGRESS NOTES
.    [facility] MEDICINE SERVICE  DAILY PROGRESS NOTE    NAME: Ap Roe  : 1/15/1928  MRN: [id number]      LOS: 4 days     PROVIDER OF SERVICE: Elli Hadley MD    Chief Complaint: Weakness    Subjective:     Interval History:  History taken from: patient    Seen and examined at bedside, was brushing his teeth and denies any complaints.  Daughter present at bedside providing detailed history, wanting him to be discharged to [facility] tomorrow.        Review of Systems:   Review of Systems negative except as mentioned above    Objective:     Vital Signs  Temp:  [97.4 °F (36.3 °C)-98.2 °F (36.8 °C)] 97.6 °F (36.4 °C)  Heart Rate:  [60-64] 64  Resp:  [16-19] 16  BP: ()/(40-58) 120/58   Body mass index is 28.59 kg/m².    Physical Exam  Physical Exam  Constitutional:       General: He is awake.      Appearance: Normal appearance. He is well-developed and well-groomed.   HENT:      Head: Normocephalic and atraumatic.      Nose: Nose normal.      Mouth/Throat:      Mouth: Mucous membranes are moist.      Pharynx: Oropharynx is clear.   Eyes:      Extraocular Movements: Extraocular movements intact.      Conjunctiva/sclera: Conjunctivae normal.      Pupils: Pupils are equal, round, and reactive to light.   Cardiovascular:      Rate and Rhythm: Normal rate and regular rhythm.      Pulses: Normal pulses.      Heart sounds: Normal heart sounds.   Pulmonary:      Effort: Pulmonary effort is normal.      Breath sounds: Normal breath sounds.   Abdominal:      General: Abdomen is flat. Bowel sounds are normal.      Palpations: Abdomen is soft.   Musculoskeletal:         General: Normal range of motion.      Cervical back: Normal range of motion and neck supple.      Right lower leg: Edema present.      Left lower leg: Edema present.   Skin:     General: Skin is warm and dry.   Neurological:      General: No focal deficit present.      Mental Status: He is alert. Mental status is at baseline. He is disoriented.       Cranial Nerves: Cranial nerves 2-12 are intact.      Sensory: Sensation is intact.      Motor: Motor function is intact.   Psychiatric:         Mood and Affect: Mood normal.         Behavior: Behavior normal. Behavior is cooperative.      Diagnostic Data    Results from last 7 days   Lab Units 11/30/24  0832   WBC 10*3/mm3 5.77   HEMOGLOBIN g/dL 9.9*   HEMATOCRIT % 29.1*   PLATELETS 10*3/mm3 164   GLUCOSE mg/dL 141*   CREATININE mg/dL 1.18   BUN mg/dL 25*   SODIUM mmol/L 130*   POTASSIUM mmol/L 3.9   AST (SGOT) U/L 27   ALT (SGPT) U/L 14   ALK PHOS U/L 75   BILIRUBIN mg/dL 0.6   ANION GAP mmol/L 10.7       No radiology results for the last day      I reviewed the patient's new clinical results.    Assessment/Plan:     Active and Resolved Problems  Active Hospital Problems    Diagnosis  POA    **Weakness [R53.1]  Yes      Resolved Hospital Problems   No resolved problems to display.       Status post fall at home  Generalized weakness  Hypomagnesemia   Diabetes mellitus  Anemia of chronic disease  Atrial fibrillation  Hypertension  Acute confusional state     His normal self is alert and oriented x 4 he is quite functional at 89 and the goal with his family would be to return home with his 92-year-old wife.  Family is very helpful.     His labs are improving.  Hyponatremia is a chronic issue for him.  He is taking in a good amount of oral intake today which is helpful.  Continue the course.     VTE Prophylaxis:  Pharmacologic & mechanical VTE prophylaxis orders are present.     Disposition Planning:      Barriers to Discharge: Insurance approval  Anticipated Date of Discharge: 12/2/2024  Place of Discharge: Rehab versus home pending progress and insurance approval        Time: 40 minutes       Code Status and Medical Interventions: No CPR (Do Not Attempt to Resuscitate); Full Support   Ordered at: 11/28/24 1024     Level Of Support Discussed With:    Next of Kin (If No Surrogate)     Code Status (Patient has no pulse  and is not breathing):    No CPR (Do Not Attempt to Resuscitate)     Medical Interventions (Patient has pulse or is breathing):    Full Support       Signature: Electronically signed by Elli Hadley MD, 12/01/24, 12:03 EST.  [facility] [place], operated by [facility] Hospitalist Team

## 2024-12-01 NOTE — THERAPY TREATMENT NOTE
Subjective: Pt agreeable to therapeutic plan of care. Patient not wanting to participate but wife encouraged him to get oob and into chair    Objective:     Precautions - falls, [place]    Bed mobility - Min-A  Transfers - CGA and with rolling walker  Ambulation - 20 and 30 feet CGA and with rolling walker    Vitals: WNL    Pain: 0 VAS     Education: Provided education on the importance of mobility in the acute care setting, Verbal/Tactile Cues, Transfer Training, and Gait Training    Assessment: Ap Roe presents with functional mobility impairments which indicate the need for skilled intervention. Tolerating session today without incident. Patient did better than expected and was able to amb in room and get into chair with CGA.Wife very encouraging and supportive. Plans on acute rehab at [place].Will continue to follow and progress as tolerated.     Plan/Recommendations:   If medically appropriate, High Intensity Therapy recommended post-acute care. This is recommended as therapy feels the patient would require 5-6 days per week, 2-3 hours per day. At this time, inpatient rehabilitation (acute rehab) would be the first choice and SNF would be second. Pt requires no DME at discharge.     Pt desires Inpatient Rehabilitation placement at discharge. Pt cooperative; agreeable to therapeutic recommendations and plan of care.         Basic Mobility 6-click:  Rollin = Total, A lot = 2, A little = 3; 4 = None  Supine>Sit:   1 = Total, A lot = 2, A little = 3; 4 = None   Sit>Stand with arms:  1 = Total, A lot = 2, A little = 3; 4 = None  Bed>Chair:   1 = Total, A lot = 2, A little = 3; 4 = None  Ambulate in room:  1 = Total, A lot = 2, A little = 3; 4 = None  3-5 Steps with railin = Total, A lot = 2, A little = 3; 4 = None  Score: 16      Post-Tx Position: Up in Chair, Alarms activated, and Call light and personal items within reach  PPE: gloves    Therapy Charges for Today       Code Description Service Date  Service Provider Modifiers Qty    36147527578 HC GAIT TRAINING EA 15 MIN 12/1/2024 America Rock PTA GP 1    08308672537 HC PT THERAPEUTIC ACT EA 15 MIN 12/1/2024 America Rock PTA GP 1           PT Charges       Row Name 12/01/24 1504             Time Calculation    Start Time 0730  -      Stop Time 0750  -      Time Calculation (min) 20 min  -      PT Received On 12/01/24  -      PT - Next Appointment 12/02/24  -         Time Calculation- PT    Total Timed Code Minutes- PT 20 minute(s)  -                User Key  (r) = Recorded By, (t) = Taken By, (c) = Cosigned By      Initials Name Provider Type     America Rock PTA Physical Therapist Assistant

## 2024-12-02 LAB
ALBUMIN SERPL-MCNC: 3.4 G/DL (ref 3.5–5.2)
ALBUMIN/GLOB SERPL: 1.4 G/DL
ALP SERPL-CCNC: 102 U/L (ref 39–117)
ALT SERPL W P-5'-P-CCNC: 16 U/L (ref 1–41)
ANION GAP SERPL CALCULATED.3IONS-SCNC: 8.8 MMOL/L (ref 5–15)
AST SERPL-CCNC: 34 U/L (ref 1–40)
BILIRUB SERPL-MCNC: 0.5 MG/DL (ref 0–1.2)
BUN SERPL-MCNC: 33 MG/DL (ref 8–23)
BUN/CREAT SERPL: 21.4 (ref 7–25)
CALCIUM SPEC-SCNC: 8.9 MG/DL (ref 8.2–9.6)
CHLORIDE SERPL-SCNC: 95 MMOL/L (ref 98–107)
CO2 SERPL-SCNC: 19.2 MMOL/L (ref 22–29)
CREAT SERPL-MCNC: 1.54 MG/DL (ref 0.76–1.27)
EGFRCR SERPLBLD CKD-EPI 2021: 41 ML/MIN/1.73
GLOBULIN UR ELPH-MCNC: 2.5 GM/DL
GLUCOSE BLDC GLUCOMTR-MCNC: 167 MG/DL (ref 70–105)
GLUCOSE BLDC GLUCOMTR-MCNC: 184 MG/DL (ref 70–105)
GLUCOSE BLDC GLUCOMTR-MCNC: 199 MG/DL (ref 70–105)
GLUCOSE BLDC GLUCOMTR-MCNC: 322 MG/DL (ref 70–105)
GLUCOSE SERPL-MCNC: 276 MG/DL (ref 65–99)
MAGNESIUM SERPL-MCNC: 2.1 MG/DL (ref 1.7–2.3)
POTASSIUM SERPL-SCNC: 5.3 MMOL/L (ref 3.5–5.2)
PROT SERPL-MCNC: 5.9 G/DL (ref 6–8.5)
SODIUM SERPL-SCNC: 123 MMOL/L (ref 136–145)

## 2024-12-02 PROCEDURE — 82948 REAGENT STRIP/BLOOD GLUCOSE: CPT

## 2024-12-02 PROCEDURE — 83880 ASSAY OF NATRIURETIC PEPTIDE: CPT | Performed by: INTERNAL MEDICINE

## 2024-12-02 PROCEDURE — 97535 SELF CARE MNGMENT TRAINING: CPT

## 2024-12-02 PROCEDURE — 80053 COMPREHEN METABOLIC PANEL: CPT | Performed by: FAMILY MEDICINE

## 2024-12-02 PROCEDURE — 83735 ASSAY OF MAGNESIUM: CPT

## 2024-12-02 PROCEDURE — 82948 REAGENT STRIP/BLOOD GLUCOSE: CPT | Performed by: PHYSICIAN ASSISTANT

## 2024-12-02 PROCEDURE — 63710000001 INSULIN LISPRO (HUMAN) PER 5 UNITS: Performed by: PHYSICIAN ASSISTANT

## 2024-12-02 PROCEDURE — 97110 THERAPEUTIC EXERCISES: CPT

## 2024-12-02 RX ADMIN — PROPRANOLOL HYDROCHLORIDE 80 MG: 20 TABLET ORAL at 08:13

## 2024-12-02 RX ADMIN — TAMSULOSIN HYDROCHLORIDE 0.4 MG: 0.4 CAPSULE ORAL at 08:13

## 2024-12-02 RX ADMIN — INSULIN LISPRO 2 UNITS: 100 INJECTION, SOLUTION INTRAVENOUS; SUBCUTANEOUS at 20:40

## 2024-12-02 RX ADMIN — PIOGLITAZONE 30 MG: 30 TABLET ORAL at 08:17

## 2024-12-02 RX ADMIN — APIXABAN 5 MG: 5 TABLET, FILM COATED ORAL at 08:13

## 2024-12-02 RX ADMIN — AMIODARONE HYDROCHLORIDE 200 MG: 200 TABLET ORAL at 08:13

## 2024-12-02 RX ADMIN — ATORVASTATIN CALCIUM 10 MG: 10 TABLET ORAL at 08:13

## 2024-12-02 RX ADMIN — APIXABAN 5 MG: 5 TABLET, FILM COATED ORAL at 20:40

## 2024-12-02 RX ADMIN — LOSARTAN POTASSIUM 100 MG: 50 TABLET, FILM COATED ORAL at 08:13

## 2024-12-02 RX ADMIN — INSULIN LISPRO 5 UNITS: 100 INJECTION, SOLUTION INTRAVENOUS; SUBCUTANEOUS at 17:19

## 2024-12-02 RX ADMIN — INSULIN LISPRO 2 UNITS: 100 INJECTION, SOLUTION INTRAVENOUS; SUBCUTANEOUS at 08:14

## 2024-12-02 NOTE — PLAN OF CARE
Goal Outcome Evaluation:  Plan of Care Reviewed With: patient, spouse, durable power of , child        Progress: no change  Outcome Evaluation: PAatient family has been at bedside all shift. Daughter is very attentive to patients need.Pt hoping to discharge tomorrow. A& O x4

## 2024-12-02 NOTE — PAYOR COMM NOTE
"CLINICAL UPDATE 12/02/2024:        AUTH # [id number]   Luh Roe (89 y.o. Male) 01/15/1928      REMAINS IN-HOUSE         AUTHORIZATION PENDING:   PLEASE CALL OR FAX DETERMINATION TO CONTACT BELOW. THANK YOU.        Elizabeth Amezcua, RN MSN  /UR  [facility]  [phone] office  [phone] fax  [email or web address]    [facility]  NPI: [id number]  Tax: [id number]          Luh Roe (89 y.o. Male)       Date of Birth   01/15/1928    Social Security Number       Address   [address] [place] [facility]    Home Phone   [phone]    MRN   [id number]       Congregation   [employer]    Marital Status                               Admission Date   11/26/24    Admission Type   Emergency    Admitting Provider   Shaji Loredo MD    Attending Provider   Twyla Johnson MD    Department, Room/Bed   [facility] MEDICAL INPATIENT, 312/1       Discharge Date       Discharge Disposition       Discharge Destination                                 Attending Provider: Twyla Johnson MD    Allergies: Celebrex [Celecoxib]    Isolation: None   Infection: None   Code Status: No CPR    Ht: 172.7 cm (68\")   Wt: 85.3 kg (188 lb 0.8 oz)    Admission Cmt: None   Principal Problem: Weakness [R53.1]                   Active Insurance as of 11/26/2024       Primary Coverage       Payor Plan Insurance Group Employer/Plan Group    HUMANA MEDICARE REPLACEMENT HUMANA MED ADV GROUP [id number]       Payor Plan Address Payor Plan Phone Number Payor Plan Fax Number Effective Dates    [address] [address] [phone]  1/1/2018 - None Entered    [facility] [id number]         Subscriber Name Subscriber Birth Date Member ID       LUH ROE 1/15/1928 [id number]                     Emergency Contacts        (Rel.) Home Phone Work Phone Mobile Phone    ROSASMICHAEL KENDRICK (Daughter) -- -- [phone]    ARPITRILEY (Spouse) [phone] -- --    Joyce Marte " (Daughter) [phone] -- --              Operative/Procedure Notes (last 5 days)  Notes from 24 1236 through 24 1236   No notes of this type exist for this encounter.          Physician Progress Notes (last 5 days)        Twyla Johnson MD at 24 [facility]              [facility] MEDICINE SERVICE  DAILY PROGRESS NOTE    NAME: Ap Roe  : 1/15/1928  MRN: [id number]      LOS: 5 days     PROVIDER OF SERVICE: Twyla Johnson MD    Chief Complaint: Weakness    Subjective:     Interval History:  History taken from: patient    Patient otherwise feeling well this morning.  Daughter is present at bedside.  Patient denies any chest pain or shortness of breath.  He is agreeable to idea of going to a rehab facility.        Review of Systems:   Review of Systems   Constitutional: Negative.    Respiratory: Negative.     Cardiovascular: Negative.    Gastrointestinal: Negative.    Musculoskeletal: Negative.    Neurological: Negative.    Psychiatric/Behavioral: Negative.         Objective:     Vital Signs  Temp:  [97.3 °F (36.3 °C)-98.1 °F (36.7 °C)] 97.3 °F (36.3 °C)  Heart Rate:  [60-77] 60  Resp:  [14-22] 16  BP: (102-124)/(43-62) 104/51   Body mass index is 28.59 kg/m².    Physical Exam  Physical Exam  Constitutional:       General: He is awake.      Appearance: Normal appearance. He is well-developed and well-groomed.   HENT:      Head: Normocephalic and atraumatic.      Nose: Nose normal.      Mouth/Throat:      Mouth: Mucous membranes are moist.      Pharynx: Oropharynx is clear.   Eyes:      Extraocular Movements: Extraocular movements intact.      Conjunctiva/sclera: Conjunctivae normal.      Pupils: Pupils are equal, round, and reactive to light.   Cardiovascular:      Rate and Rhythm: Normal rate and regular rhythm.      Pulses: Normal pulses.      Heart sounds: Normal heart sounds.   Pulmonary:      Effort: Pulmonary effort is normal.      Breath sounds: Normal breath sounds.   Abdominal:       General: Abdomen is flat. Bowel sounds are normal.      Palpations: Abdomen is soft.   Musculoskeletal:         General: Normal range of motion.      Cervical back: Normal range of motion and neck supple.      Right lower leg: No edema.      Left lower leg: No edema.   Skin:     General: Skin is warm and dry.   Neurological:      General: No focal deficit present.      Mental Status: He is alert and oriented to person, place, and time. Mental status is at baseline.      Cranial Nerves: Cranial nerves 2-12 are intact.      Sensory: Sensation is intact.      Motor: Motor function is intact.   Psychiatric:         Mood and Affect: Mood normal.         Behavior: Behavior normal. Behavior is cooperative.         Thought Content: Thought content normal.         Judgment: Judgment normal.            Diagnostic Data    Results from last 7 days   Lab Units 11/30/24  0832   WBC 10*3/mm3 5.77   HEMOGLOBIN g/dL 9.9*   HEMATOCRIT % 29.1*   PLATELETS 10*3/mm3 164   GLUCOSE mg/dL 141*   CREATININE mg/dL 1.18   BUN mg/dL 25*   SODIUM mmol/L 130*   POTASSIUM mmol/L 3.9   AST (SGOT) U/L 27   ALT (SGPT) U/L 14   ALK PHOS U/L 75   BILIRUBIN mg/dL 0.6   ANION GAP mmol/L 10.7       No radiology results for the last day      I reviewed the patient's new clinical results.    Assessment/Plan:     Active and Resolved Problems  Active Hospital Problems    Diagnosis  POA    **Weakness [R53.1]  Yes      Resolved Hospital Problems   No resolved problems to display.     Status post fall at home  Generalized weakness  Hypomagnesemia   Diabetes mellitus  Anemia of chronic disease  Atrial fibrillation  Hypertension  Acute confusional state  Hyponatremia, chronic      Patient is otherwise doing well today.  He is currently alert and orient x 4.  Noted plans for disposition and/or discharge planning to acute rehab.  Await insurance approval.  Family declines any further laboratory studies.  This will be conducted at the rehab facility in the next 2 to  3 days.  For now patient appears medically stable for discharge planning.  Lasix remains on hold.      VTE Prophylaxis:  Pharmacologic & mechanical VTE prophylaxis orders are present.             Disposition Planning:     Barriers to Discharge: Insurance approval  Anticipated Date of Discharge: 12/3/2024  Place of Discharge: Acute rehab      Time: 45 minutes     Code Status and Medical Interventions: No CPR (Do Not Attempt to Resuscitate); Full Support   Ordered at: 24 1024     Level Of Support Discussed With:    Next of Kin (If No Surrogate)     Code Status (Patient has no pulse and is not breathing):    No CPR (Do Not Attempt to Resuscitate)     Medical Interventions (Patient has pulse or is breathing):    Full Support       Signature: Electronically signed by Twyla Johnson MD, 24, 11:41 EST.  [facility] Hospitalist Team      Electronically signed by Twyla Johnson MD at 24 1143       Jomar, Elli PATEL MD at 24 1203          .    [facility] MEDICINE SERVICE  DAILY PROGRESS NOTE    NAME: Ap Roe  : 1/15/1928  MRN: [id number]      LOS: 4 days     PROVIDER OF SERVICE: Elli Hadley MD    Chief Complaint: Weakness    Subjective:     Interval History:  History taken from: patient    Seen and examined at bedside, was brushing his teeth and denies any complaints.  Daughter present at bedside providing detailed history, wanting him to be discharged to [facility] tomorrow.        Review of Systems:   Review of Systems negative except as mentioned above    Objective:     Vital Signs  Temp:  [97.4 °F (36.3 °C)-98.2 °F (36.8 °C)] 97.6 °F (36.4 °C)  Heart Rate:  [60-64] 64  Resp:  [16-19] 16  BP: ()/(40-58) 120/58   Body mass index is 28.59 kg/m².    Physical Exam  Physical Exam  Constitutional:       General: He is awake.      Appearance: Normal appearance. He is well-developed and well-groomed.   HENT:      Head: Normocephalic and atraumatic.      Nose: Nose normal.       Mouth/Throat:      Mouth: Mucous membranes are moist.      Pharynx: Oropharynx is clear.   Eyes:      Extraocular Movements: Extraocular movements intact.      Conjunctiva/sclera: Conjunctivae normal.      Pupils: Pupils are equal, round, and reactive to light.   Cardiovascular:      Rate and Rhythm: Normal rate and regular rhythm.      Pulses: Normal pulses.      Heart sounds: Normal heart sounds.   Pulmonary:      Effort: Pulmonary effort is normal.      Breath sounds: Normal breath sounds.   Abdominal:      General: Abdomen is flat. Bowel sounds are normal.      Palpations: Abdomen is soft.   Musculoskeletal:         General: Normal range of motion.      Cervical back: Normal range of motion and neck supple.      Right lower leg: Edema present.      Left lower leg: Edema present.   Skin:     General: Skin is warm and dry.   Neurological:      General: No focal deficit present.      Mental Status: He is alert. Mental status is at baseline. He is disoriented.      Cranial Nerves: Cranial nerves 2-12 are intact.      Sensory: Sensation is intact.      Motor: Motor function is intact.   Psychiatric:         Mood and Affect: Mood normal.         Behavior: Behavior normal. Behavior is cooperative.      Diagnostic Data    Results from last 7 days   Lab Units 11/30/24  0832   WBC 10*3/mm3 5.77   HEMOGLOBIN g/dL 9.9*   HEMATOCRIT % 29.1*   PLATELETS 10*3/mm3 164   GLUCOSE mg/dL 141*   CREATININE mg/dL 1.18   BUN mg/dL 25*   SODIUM mmol/L 130*   POTASSIUM mmol/L 3.9   AST (SGOT) U/L 27   ALT (SGPT) U/L 14   ALK PHOS U/L 75   BILIRUBIN mg/dL 0.6   ANION GAP mmol/L 10.7       No radiology results for the last day      I reviewed the patient's new clinical results.    Assessment/Plan:     Active and Resolved Problems  Active Hospital Problems    Diagnosis  POA    **Weakness [R53.1]  Yes      Resolved Hospital Problems   No resolved problems to display.       Status post fall at home  Generalized weakness  Hypomagnesemia    Diabetes mellitus  Anemia of chronic disease  Atrial fibrillation  Hypertension  Acute confusional state     His normal self is alert and oriented x 4 he is quite functional at 89 and the goal with his family would be to return home with his 92-year-old wife.  Family is very helpful.     His labs are improving.  Hyponatremia is a chronic issue for him.  He is taking in a good amount of oral intake today which is helpful.  Continue the course.     VTE Prophylaxis:  Pharmacologic & mechanical VTE prophylaxis orders are present.     Disposition Planning:      Barriers to Discharge: Insurance approval  Anticipated Date of Discharge: 2024  Place of Discharge: Rehab versus home pending progress and insurance approval        Time: 40 minutes       Code Status and Medical Interventions: No CPR (Do Not Attempt to Resuscitate); Full Support   Ordered at: 24 1024     Level Of Support Discussed With:    Next of Kin (If No Surrogate)     Code Status (Patient has no pulse and is not breathing):    No CPR (Do Not Attempt to Resuscitate)     Medical Interventions (Patient has pulse or is breathing):    Full Support       Signature: Electronically signed by Elli Hadley MD, 24, 12:03 [facility] Hospitalist Team      Electronically signed by Elli Hadley MD at 24 1205       Gus Norton MD at 24 1400              [facility] MEDICINE SERVICE  DAILY PROGRESS NOTE    NAME: Ap Roe  : 1/15/1928  MRN: [id number]      LOS: 3 days     PROVIDER OF SERVICE: Gus Norton MD    Chief Complaint: Weakness    Subjective:     Interval History:  History taken from: patient    Delirious but improving.  Daughter is at bedside and provides helpful information.      Review of Systems:   Review of Systems   Respiratory: Negative.     Cardiovascular: Negative.    Gastrointestinal: Negative.    Musculoskeletal: Negative.    Neurological:  Positive for weakness.   Psychiatric/Behavioral:   Positive for behavioral problems and confusion. The patient is nervous/anxious.        Objective:     Vital Signs  Temp:  [96.8 °F (36 °C)-97.9 °F (36.6 °C)] 96.8 °F (36 °C)  Heart Rate:  [60-63] 60  Resp:  [14-20] 17  BP: (111-126)/(50-64) 111/56   Body mass index is 28.59 kg/m².    Physical Exam  Physical Exam  Constitutional:       General: He is awake.      Appearance: Normal appearance. He is well-developed and well-groomed.   HENT:      Head: Normocephalic and atraumatic.      Nose: Nose normal.      Mouth/Throat:      Mouth: Mucous membranes are moist.      Pharynx: Oropharynx is clear.   Eyes:      Extraocular Movements: Extraocular movements intact.      Conjunctiva/sclera: Conjunctivae normal.      Pupils: Pupils are equal, round, and reactive to light.   Cardiovascular:      Rate and Rhythm: Normal rate and regular rhythm.      Pulses: Normal pulses.      Heart sounds: Normal heart sounds.   Pulmonary:      Effort: Pulmonary effort is normal.      Breath sounds: Normal breath sounds.   Abdominal:      General: Abdomen is flat. Bowel sounds are normal.      Palpations: Abdomen is soft.   Musculoskeletal:         General: Normal range of motion.      Cervical back: Normal range of motion and neck supple.      Right lower leg: Edema present.      Left lower leg: Edema present.   Skin:     General: Skin is warm and dry.   Neurological:      General: No focal deficit present.      Mental Status: He is alert. Mental status is at baseline. He is disoriented.      Cranial Nerves: Cranial nerves 2-12 are intact.      Sensory: Sensation is intact.      Motor: Motor function is intact.   Psychiatric:         Mood and Affect: Mood normal.         Behavior: Behavior normal. Behavior is cooperative.            Diagnostic Data    Results from last 7 days   Lab Units 11/30/24  0832   WBC 10*3/mm3 5.77   HEMOGLOBIN g/dL 9.9*   HEMATOCRIT % 29.1*   PLATELETS 10*3/mm3 164   GLUCOSE mg/dL 141*   CREATININE mg/dL 1.18   BUN  mg/dL 25*   SODIUM mmol/L 130*   POTASSIUM mmol/L 3.9   AST (SGOT) U/L 27   ALT (SGPT) U/L 14   ALK PHOS U/L 75   BILIRUBIN mg/dL 0.6   ANION GAP mmol/L 10.7       No radiology results for the last day      I reviewed the patient's new clinical results.    Assessment/Plan:     Active and Resolved Problems  Active Hospital Problems    Diagnosis  POA    **Weakness [R53.1]  Yes      Resolved Hospital Problems   No resolved problems to display.     Status post fall at home  Generalized weakness  Hypomagnesemia   Diabetes mellitus  Anemia of chronic disease  Atrial fibrillation  Hypertension  Acute confusional state    Patient is quite delirious but improving per his family who is at bedside today.  We are hoping that he will be more awake and interactive later on in the morning at this afternoon.  We are going to schedule a small dose of trazodone for this evening to try to reset his sleep-wake cycles.  His normal self is alert and oriented x 4 he is quite functional at 89 and the goal with his family would be to return home with his 92-year-old wife.  Family is very helpful.    His labs are improving.  Hyponatremia is a chronic issue for him.  He is taking in a good amount of oral intake today which is helpful.  Continue the course.    VTE Prophylaxis:  Pharmacologic & mechanical VTE prophylaxis orders are present.             Disposition Planning:     Barriers to Discharge: Insurance approval  Anticipated Date of Discharge: 12/1/2024  Place of Discharge: Rehab versus home pending progress and insurance approval      Time: 40 minutes     Code Status and Medical Interventions: No CPR (Do Not Attempt to Resuscitate); Full Support   Ordered at: 11/28/24 1024     Level Of Support Discussed With:    Next of Kin (If No Surrogate)     Code Status (Patient has no pulse and is not breathing):    No CPR (Do Not Attempt to Resuscitate)     Medical Interventions (Patient has pulse or is breathing):    Full Support       Signature:  Electronically signed by Gus Norton MD, 24, 14:[facility]      Electronically signed by Gus Norton MD at 24 1401       Twyla Johnson MD at 24 1012              [facility] MEDICINE SERVICE  DAILY PROGRESS NOTE    NAME: Ap Roe  : 1/15/1928  MRN: [id number]      LOS: 2 days     PROVIDER OF SERVICE: Twyla Johnson MD    Chief Complaint: Weakness    Subjective:     Interval History:  History taken from: patient    Patient otherwise appears comfortable.  He is confused this morning.  Wife as well as daughters are present at bedside.  No physical aggression noted.  Patient did have trouble sleeping overnight.        Review of Systems:   Review of Systems   Respiratory: Negative.     Cardiovascular: Negative.    Gastrointestinal: Negative.    Musculoskeletal: Negative.    Neurological:  Positive for weakness.   Psychiatric/Behavioral:  Positive for behavioral problems and confusion. The patient is nervous/anxious.        Objective:     Vital Signs  Temp:  [97.6 °F (36.4 °C)-98.3 °F (36.8 °C)] 98.3 °F (36.8 °C)  Heart Rate:  [60-82] 63  Resp:  [15-22] 17  BP: (107-171)/(48-76) 171/72   Body mass index is 28.59 kg/m².    Physical Exam  Physical Exam  Constitutional:       General: He is awake.      Appearance: Normal appearance. He is well-developed and well-groomed.   HENT:      Head: Normocephalic and atraumatic.      Nose: Nose normal.      Mouth/Throat:      Mouth: Mucous membranes are moist.      Pharynx: Oropharynx is clear.   Eyes:      Extraocular Movements: Extraocular movements intact.      Conjunctiva/sclera: Conjunctivae normal.      Pupils: Pupils are equal, round, and reactive to light.   Cardiovascular:      Rate and Rhythm: Normal rate and regular rhythm.      Pulses: Normal pulses.      Heart sounds: Normal heart sounds.   Pulmonary:      Effort: Pulmonary effort is normal.      Breath sounds: Normal breath sounds.   Abdominal:       General: Abdomen is flat. Bowel sounds are normal.      Palpations: Abdomen is soft.   Musculoskeletal:         General: Normal range of motion.      Cervical back: Normal range of motion and neck supple.      Right lower leg: Edema present.      Left lower leg: Edema present.   Skin:     General: Skin is warm and dry.   Neurological:      General: No focal deficit present.      Mental Status: He is alert. Mental status is at baseline. He is disoriented.      Cranial Nerves: Cranial nerves 2-12 are intact.      Sensory: Sensation is intact.      Motor: Motor function is intact.   Psychiatric:         Mood and Affect: Mood normal.         Behavior: Behavior normal. Behavior is cooperative.            Diagnostic Data    Results from last 7 days   Lab Units 11/29/24  0044 11/28/24  0554 11/27/24  0336   WBC 10*3/mm3 5.09  --  5.94   HEMOGLOBIN g/dL 10.3*  --  10.8*   HEMATOCRIT % 30.7*  --  32.6*   PLATELETS 10*3/mm3 200  --  183   GLUCOSE mg/dL 108*   < > 173*   CREATININE mg/dL 1.38*   < > 1.26   BUN mg/dL 24*   < > 21   SODIUM mmol/L 129*   < > 130*   POTASSIUM mmol/L 4.1   < > 3.6   AST (SGOT) U/L  --   --  29   ALT (SGPT) U/L  --   --  16   ALK PHOS U/L  --   --  85   BILIRUBIN mg/dL  --   --  0.8   ANION GAP mmol/L 10.4   < > 11.4    < > = values in this interval not displayed.       No radiology results for the last day      I reviewed the patient's new clinical results.    Assessment/Plan:     Active and Resolved Problems  Active Hospital Problems    Diagnosis  POA    **Weakness [R53.1]  Yes      Resolved Hospital Problems   No resolved problems to display.     Status post fall at home  Generalized weakness  Hypomagnesemia   Diabetes mellitus  Anemia of chronic disease  Atrial fibrillation  Hypertension  Acute confusional state    Long discussion with patient as well as family members present at bedside.  Patient with increased confusion this morning.  Noted patient had significant sleep deprivation overnight  and was unable to sleep well.  Will check urinalysis to rule out infection.  Noted white count normal.  Noted patient afebrile as well.  Patient denies any other respiratory symptoms at the present time.  At baseline as per family members patient is alert and orient x 4 it is able to ambulate without use of assistive device.  If urine analysis is negative would trial low-dose trazodone to see if it would help with sleep while inpatient at the hospital.  Insurance currently pending approval for disposition to acute rehab facility.  Referral has been made for [facility].    Noted creatinine as well as sodium level.  Sodium level slowly dropping.  Will hold Lasix for now.  Recheck a.m. labs.  If further decline in sodium level may require nephrology evaluation.    If Insurance denies disposition to acute rehab facility family members are okay with the idea of patient being discharged home.  Patient has significant support at home as well as accessibility to outpatient labs for appropriate follow-up.    VTE Prophylaxis:  Pharmacologic & mechanical VTE prophylaxis orders are present.             Disposition Planning:     Barriers to Discharge: Insurance approval  Anticipated Date of Discharge: 11/30/2024  Place of Discharge: Rehab versus home pending progress and insurance approval      Time: 40 minutes     Code Status and Medical Interventions: No CPR (Do Not Attempt to Resuscitate); Full Support   Ordered at: 11/28/24 1024     Level Of Support Discussed With:    Next of Kin (If No Surrogate)     Code Status (Patient has no pulse and is not breathing):    No CPR (Do Not Attempt to Resuscitate)     Medical Interventions (Patient has pulse or is breathing):    Full Support       Signature: Electronically signed by Twyla Johnson MD, 11/29/24, 10:12 EST.  [facility] Hospitalist Team      Electronically signed by Twyla Johnson MD at 11/29/24 1017       Elva Jain APRN at 11/28/24 0955       Attestation signed by Alex  Twyla BOYD MD at 24 1026    I have reviewed this documentation and agree.  Long discussion regards to CODE STATUS with family and was present bedside.  Family members acknowledge and stated the patient is a DNR.                    [facility] MEDICINE SERVICE  DAILY PROGRESS NOTE    NAME: Ap Roe  : 1/15/1928  MRN: [id number]      LOS: 1 day     PROVIDER OF SERVICE: DOMONIQUE Yin    Chief Complaint: Weakness    Subjective:     Interval History:  History taken from: patient    Patient appears comfortable, asleep in recliner. Family at bedside.         Review of Systems:   Review of Systems   Respiratory:  Negative for shortness of breath.    Cardiovascular:  Negative for chest pain.   Neurological:  Negative for dizziness and headaches.       Objective:     Vital Signs  Temp:  [97.4 °F (36.3 °C)-98.8 °F (37.1 °C)] 97.6 °F (36.4 °C)  Heart Rate:  [60-63] 61  Resp:  [14-19] 19  BP: (128-166)/(56-81) 134/56   Body mass index is 28.59 kg/m².    Physical Exam  Physical Exam  General: 89 yo male, asleep, well nourished, no acute distress.  HENT: Normocephalic, normal hearing, moist oral mucosa, no scleral icterus.  Neck: Supple, non-tender, no carotid bruits, no JVD, no LAD.  Lungs: non-labored respiration.  Heart: RRR.  Abdomen: Soft, non-distended  Musculoskeletal: Normal range of motion and strength, no tenderness or swelling.  Skin: Skin is warm, dry and pink, no rashes or lesions.  Psychiatric: Cooperative, appropriate mood and affect.       Diagnostic Data    Results from last 7 days   Lab Units 24  0554 24  0336   WBC 10*3/mm3  --  5.94   HEMOGLOBIN g/dL  --  10.8*   HEMATOCRIT %  --  32.6*   PLATELETS 10*3/mm3  --  183   GLUCOSE mg/dL 136* 173*   CREATININE mg/dL 1.22 1.26   BUN mg/dL 21 21   SODIUM mmol/L 132* 130*   POTASSIUM mmol/L 3.6 3.6   AST (SGOT) U/L  --  29   ALT (SGPT) U/L  --  16   ALK PHOS U/L  --  85   BILIRUBIN mg/dL  --  0.8   ANION GAP mmol/L 12.8 11.4       XR  Hip With or Without Pelvis 2 - 3 View Left    Result Date: 11/26/2024  Impression: Negative for acute osseous abnormality. Electronically Signed: Damian Keen MD  11/26/2024 5:50 PM EST  Workstation ID: [id number]    XR Knee 1 or 2 View Bilateral    Result Date: 11/26/2024  Impression: 1.Degenerative changes in both knees. 2.No fractures are identified. 3.Soft tissue swelling laterally on the left. Electronically Signed: Jose Cassidy MD  11/26/2024 4:18 PM EST  Workstation ID: [id number]       I reviewed the patient's new clinical results.    Assessment/Plan:     Active and Resolved Problems  Active Hospital Problems    Diagnosis  POA    **Weakness [R53.1]  Yes      Resolved Hospital Problems   No resolved problems to display.       Fall   Weakness  - X-ray of knee showed degenerative changes with no fractures and soft tissues swelling on the left  - X-ray of hip showed no acute abnormality  - fall precautions  - PT/OT consulted recommending inpatient rehab; PT/OT to continue working with patient  - Pre-cert for [facility] rehab pending  - Patient was confused last night, required sitter to be at bedside. Family at bedside today, no sitter present.      Hypomagnesemia  - Magnesium: 1.3 on admit   - Mag sulfate given in the ED  - Repeat 1.6  - Family desires patient to be started on oral magnesium supplement, this is ordered as a patient supplied medication     Hyponatremia   - appears chronic   - Sodium: 132, 130 at baseline   - Monitor while admitted     Diabetes mellitus  -   - Hold metformin  - Continue Actos  - SSI ordered  - Diabetic diet     Anemia   - Hgb 10.8   - No overt s/sx of bleeding   - Continue to monitor CBC  - Transfuse if Hgb < 7  - Will do CBC in Am to continue to monitor     Atrial fibrillation  -Continue amiodarone, propranolol and Eliquis     Hypertension  - BP stable  - continue losartan    VTE Prophylaxis:  Pharmacologic & mechanical VTE prophylaxis orders are present.             Disposition  Planning:     Barriers to Discharge:pre-cert pending, sitter free 24h  Anticipated Date of Discharge: 12/2/2024  Place of Discharge: [facility] rehab      Time: 35 minutes     Code Status and Medical Interventions: CPR (Attempt to Resuscitate); Full Support   Ordered at: 11/26/24 1906     Code Status (Patient has no pulse and is not breathing):    CPR (Attempt to Resuscitate)     Medical Interventions (Patient has pulse or is breathing):    Full Support       Signature: Electronically signed by DOMONIQUE Yin, 11/28/24, 09:54 EST.  [facility] Hospitalist Team      Electronically signed by Twyla Johnson MD at 11/28/24 1026       Consult Notes (last 5 days)  Notes from 11/27/24 1236 through 12/02/24 1236   No notes of this type exist for this encounter.

## 2024-12-02 NOTE — PROGRESS NOTES
[facility] MEDICINE SERVICE  DAILY PROGRESS NOTE    NAME: Ap Roe  : 1/15/1928  MRN: [id number]      LOS: 5 days     PROVIDER OF SERVICE: Twyla Johnson MD    Chief Complaint: Weakness    Subjective:     Interval History:  History taken from: patient    Patient otherwise feeling well this morning.  Daughter is present at bedside.  Patient denies any chest pain or shortness of breath.  He is agreeable to idea of going to a rehab facility.        Review of Systems:   Review of Systems   Constitutional: Negative.    Respiratory: Negative.     Cardiovascular: Negative.    Gastrointestinal: Negative.    Musculoskeletal: Negative.    Neurological: Negative.    Psychiatric/Behavioral: Negative.         Objective:     Vital Signs  Temp:  [97.3 °F (36.3 °C)-98.1 °F (36.7 °C)] 97.3 °F (36.3 °C)  Heart Rate:  [60-77] 60  Resp:  [14-22] 16  BP: (102-124)/(43-62) 104/51   Body mass index is 28.59 kg/m².    Physical Exam  Physical Exam  Constitutional:       General: He is awake.      Appearance: Normal appearance. He is well-developed and well-groomed.   HENT:      Head: Normocephalic and atraumatic.      Nose: Nose normal.      Mouth/Throat:      Mouth: Mucous membranes are moist.      Pharynx: Oropharynx is clear.   Eyes:      Extraocular Movements: Extraocular movements intact.      Conjunctiva/sclera: Conjunctivae normal.      Pupils: Pupils are equal, round, and reactive to light.   Cardiovascular:      Rate and Rhythm: Normal rate and regular rhythm.      Pulses: Normal pulses.      Heart sounds: Normal heart sounds.   Pulmonary:      Effort: Pulmonary effort is normal.      Breath sounds: Normal breath sounds.   Abdominal:      General: Abdomen is flat. Bowel sounds are normal.      Palpations: Abdomen is soft.   Musculoskeletal:         General: Normal range of motion.      Cervical back: Normal range of motion and neck supple.      Right lower leg: No edema.      Left lower leg: No edema.   Skin:      General: Skin is warm and dry.   Neurological:      General: No focal deficit present.      Mental Status: He is alert and oriented to person, place, and time. Mental status is at baseline.      Cranial Nerves: Cranial nerves 2-12 are intact.      Sensory: Sensation is intact.      Motor: Motor function is intact.   Psychiatric:         Mood and Affect: Mood normal.         Behavior: Behavior normal. Behavior is cooperative.         Thought Content: Thought content normal.         Judgment: Judgment normal.            Diagnostic Data    Results from last 7 days   Lab Units 11/30/24  0832   WBC 10*3/mm3 5.77   HEMOGLOBIN g/dL 9.9*   HEMATOCRIT % 29.1*   PLATELETS 10*3/mm3 164   GLUCOSE mg/dL 141*   CREATININE mg/dL 1.18   BUN mg/dL 25*   SODIUM mmol/L 130*   POTASSIUM mmol/L 3.9   AST (SGOT) U/L 27   ALT (SGPT) U/L 14   ALK PHOS U/L 75   BILIRUBIN mg/dL 0.6   ANION GAP mmol/L 10.7       No radiology results for the last day      I reviewed the patient's new clinical results.    Assessment/Plan:     Active and Resolved Problems  Active Hospital Problems    Diagnosis  POA    **Weakness [R53.1]  Yes      Resolved Hospital Problems   No resolved problems to display.     Status post fall at home  Generalized weakness  Hypomagnesemia   Diabetes mellitus  Anemia of chronic disease  Atrial fibrillation  Hypertension  Acute confusional state  Hyponatremia, chronic      Patient is otherwise doing well today.  He is currently alert and orient x 4.  Noted plans for disposition and/or discharge planning to acute rehab.  Await insurance approval.  Family declines any further laboratory studies.  This will be conducted at the rehab facility in the next 2 to 3 days.  For now patient appears medically stable for discharge planning.  Lasix remains on hold.      VTE Prophylaxis:  Pharmacologic & mechanical VTE prophylaxis orders are present.             Disposition Planning:     Barriers to Discharge: Insurance approval  Anticipated  Date of Discharge: 12/3/2024  Place of Discharge: Acute rehab      Time: 45 minutes     Code Status and Medical Interventions: No CPR (Do Not Attempt to Resuscitate); Full Support   Ordered at: 11/28/24 1024     Level Of Support Discussed With:    Next of Kin (If No Surrogate)     Code Status (Patient has no pulse and is not breathing):    No CPR (Do Not Attempt to Resuscitate)     Medical Interventions (Patient has pulse or is breathing):    Full Support       Signature: Electronically signed by Twyla Johnson MD, 12/02/24, 11:41 EST.  [facility] Hospitalist Team

## 2024-12-02 NOTE — CASE MANAGEMENT/SOCIAL WORK
Continued Stay Note  HAJA Crane     Patient Name: Ap Roe  MRN: [id number]  Today's Date: 12/2/2024    Admit Date: 11/26/2024    Plan: [facility] (accepted.) No PASRR required (PASRR approved if needed for SNF.) Precert denied. P2P scheduled 12/3/2024 at 11:00am/   Discharge Plan       Row Name 12/02/24 1705       Plan    Plan [facility] (accepted.) No PASRR required (PASRR approved if needed for SNF.) Precert denied. P2P scheduled 12/3/2024 at 11:00am/    Plan Comments DC barriers: pending rehab placement.      Row Name 12/02/24 1700       Plan    Plan Comments CM contacted by daughter Joyce multiple times throughtout the day concerning pt's pending discharge to [facility] rehab. Precert to [facility] denied and P2P scheduled for 11:00am tomorrow 12/3/2024. Daughter udated throughout day. Daughter insistant on pt discharging to [facility] for rehab, and wants it done immediately- CM informed daughter that P2P would likely be declined and that she needed to have a backup plan if pt could not discharge to [facility].                 Nuzhat Us RN     Office phone: [phone]  Office fax: [phone]

## 2024-12-02 NOTE — THERAPY TREATMENT NOTE
Subjective: Pt agreeable to therapeutic plan of care.  Cognition: oriented to Person  Pt's dtr reports he is very tired, had a bad night where he did not sleep, but pt agreeable to session.    Objective:     Precautions falls risk    Functional Transfers: N/A or Not attempted. Pt reports he felt too weak to attempt to stand, unable to scoot to edge of chair.      Grooming: SBA  ADL Position: supported sitting  ADL Comments: MOD A for initiation, SBA after    Therapeutic Exercise - 10 Reps B UE and B LE AROM supported sitting / chair    Vitals: WNL    Pain: 0 VAS      Education: Provided education on the importance of mobility in the acute care setting, ADL training, and Transfer Training      Assessment: Ap Roe presents with ADL impairments affecting function including endurance / activity tolerance and strength. Demonstrated functioning below baseline abilities indicate the need for continued skilled intervention while inpatient. Attempted sit to stand t/f however pt unable to scoot forward with MAX A and reports he feels too weak to stand.  Pt completed grooming tasks with MOD A for initiation then SBA.  Pt completed BUE and BLE AROM with minimal assist. Tolerating session today without incident. Will continue to follow and progress as tolerated.     Plan/Recommendations:   High Intensity Therapy recommended post-acute care. This is recommended as therapy feels the patient would require 5-6 days per week, 2-3 hours per day. At this time, inpatient rehabilitation (acute rehab) would be the first choice and SNF would be second.. Pt requires no DME at discharge.     Pt desires Inpatient Rehabilitation placement at discharge. Pt cooperative; agreeable to therapeutic recommendations and plan of care.     Modified Abigail: N/A = No pre-op stroke/TIA    Post-Tx Position: Up in Chair, Staff Present, Alarms activated, and Call light and personal items within reach  PPE: gloves    Therapy Charges for Today       Code  Description Service Date Service Provider Modifiers Qty    60611901137  OT SELF CARE/MGMT/TRAIN EA 15 MIN 12/2/2024 Charles East OT GO 1    [id number]  OT THER PROC EA 15 MIN 12/2/2024 Charles East OT GO 1           Time Calculation- OT       Row Name 12/02/24 1132             Time Calculation- OT    OT Start Time 1051  -MM      OT Stop Time 1114  -MM      OT Time Calculation (min) 23 min  -MM      Total Timed Code Minutes- OT 23 minute(s)  -MM      OT Received On 12/02/24  -MM      OT - Next Appointment 12/03/24  -MM                User Key  (r) = Recorded By, (t) = Taken By, (c) = Cosigned By      Initials Name Provider Type    Charles Macdonald OT Occupational Therapist

## 2024-12-02 NOTE — PLAN OF CARE
Goal Outcome Evaluation:   Pt pleasant and cooperative with POC. Pt's spouse bedside. No reports of acute events overnight. Pt remains stable, and is an assist x1 with walker to chair/bedside commode. Pt alert to person, place, but not time.

## 2024-12-02 NOTE — CASE MANAGEMENT/SOCIAL WORK
Continued Stay Note  HAJA Crane     Patient Name: Ap Roe  MRN: [id number]  Today's Date: 12/2/2024    Admit Date: 11/26/2024    Plan: [facility] (accepted.) No PASRR required (PASRR approved if needed for SNF.) Precert denied. P2P scheduled 12/3/2024 at 11:00am/   Discharge Plan       Row Name 12/02/24 1705       Plan    Plan [facility] (accepted.) No PASRR required (PASRR approved if needed for SNF.) Precert denied. P2P scheduled 12/3/2024 at 11:00am/    Plan Comments DC barriers: pending rehab placement.                     Nuzhat Us RN     Office phone: [phone]  Office fax: [phone]

## 2024-12-02 NOTE — PLAN OF CARE
Goal Outcome Evaluation:   Ap Roe presents with ADL impairments affecting function including endurance / activity tolerance and strength. Demonstrated functioning below baseline abilities indicate the need for continued skilled intervention while inpatient. Attempted sit to stand t/f however pt unable to scoot forward with MAX A and reports he feels too weak to stand.  Pt completed grooming tasks with MOD A for initiation then SBA.  Pt completed BUE and BLE AROM with minimal assist. Tolerating session today without incident. Will continue to follow and progress as tolerated.

## 2024-12-02 NOTE — NURSING NOTE
Pt's wife is at bedside. Pt's wife refused to have Pt's labs drawn. Wife stated that unless it's absolutely critical, that she did not want Pt stuck. Pt also does not have IV access. Wife also stated that she does not want new IV placed.

## 2024-12-03 LAB
GLUCOSE BLDC GLUCOMTR-MCNC: 179 MG/DL (ref 70–105)
GLUCOSE BLDC GLUCOMTR-MCNC: 189 MG/DL (ref 70–105)
GLUCOSE BLDC GLUCOMTR-MCNC: 217 MG/DL (ref 70–105)
GLUCOSE BLDC GLUCOMTR-MCNC: 229 MG/DL (ref 70–105)
NT-PROBNP SERPL-MCNC: 1612 PG/ML (ref 0–1800)

## 2024-12-03 PROCEDURE — 97535 SELF CARE MNGMENT TRAINING: CPT

## 2024-12-03 PROCEDURE — 25010000002 BUMETANIDE PER 0.5 MG: Performed by: INTERNAL MEDICINE

## 2024-12-03 PROCEDURE — 63710000001 INSULIN LISPRO (HUMAN) PER 5 UNITS: Performed by: PHYSICIAN ASSISTANT

## 2024-12-03 PROCEDURE — 97112 NEUROMUSCULAR REEDUCATION: CPT | Performed by: PHYSICAL THERAPIST

## 2024-12-03 PROCEDURE — 97530 THERAPEUTIC ACTIVITIES: CPT

## 2024-12-03 PROCEDURE — 97530 THERAPEUTIC ACTIVITIES: CPT | Performed by: PHYSICAL THERAPIST

## 2024-12-03 PROCEDURE — 97116 GAIT TRAINING THERAPY: CPT | Performed by: PHYSICAL THERAPIST

## 2024-12-03 PROCEDURE — 82948 REAGENT STRIP/BLOOD GLUCOSE: CPT

## 2024-12-03 PROCEDURE — 82948 REAGENT STRIP/BLOOD GLUCOSE: CPT | Performed by: PHYSICIAN ASSISTANT

## 2024-12-03 RX ORDER — FUROSEMIDE 20 MG/1
20 TABLET ORAL EVERY OTHER DAY
Status: CANCELLED | OUTPATIENT
Start: 2024-12-04

## 2024-12-03 RX ORDER — BUMETANIDE 0.25 MG/ML
1 INJECTION, SOLUTION INTRAMUSCULAR; INTRAVENOUS ONCE
Status: COMPLETED | OUTPATIENT
Start: 2024-12-03 | End: 2024-12-03

## 2024-12-03 RX ORDER — FUROSEMIDE 20 MG/1
20 TABLET ORAL DAILY
Status: DISCONTINUED | OUTPATIENT
Start: 2024-12-03 | End: 2024-12-04

## 2024-12-03 RX ORDER — LOSARTAN POTASSIUM 50 MG/1
50 TABLET ORAL DAILY
Status: DISCONTINUED | OUTPATIENT
Start: 2024-12-04 | End: 2024-12-05

## 2024-12-03 RX ADMIN — BUMETANIDE 1 MG: 0.25 INJECTION INTRAMUSCULAR; INTRAVENOUS at 15:57

## 2024-12-03 RX ADMIN — INSULIN LISPRO 2 UNITS: 100 INJECTION, SOLUTION INTRAVENOUS; SUBCUTANEOUS at 12:46

## 2024-12-03 RX ADMIN — PIOGLITAZONE 30 MG: 30 TABLET ORAL at 12:25

## 2024-12-03 RX ADMIN — INSULIN LISPRO 3 UNITS: 100 INJECTION, SOLUTION INTRAVENOUS; SUBCUTANEOUS at 20:34

## 2024-12-03 RX ADMIN — TAMSULOSIN HYDROCHLORIDE 0.4 MG: 0.4 CAPSULE ORAL at 08:29

## 2024-12-03 RX ADMIN — SODIUM ZIRCONIUM CYCLOSILICATE 10 G: 10 POWDER, FOR SUSPENSION ORAL at 17:30

## 2024-12-03 RX ADMIN — APIXABAN 5 MG: 5 TABLET, FILM COATED ORAL at 08:30

## 2024-12-03 RX ADMIN — ATORVASTATIN CALCIUM 10 MG: 10 TABLET ORAL at 08:29

## 2024-12-03 RX ADMIN — Medication 10 ML: at 20:34

## 2024-12-03 RX ADMIN — FUROSEMIDE 20 MG: 20 TABLET ORAL at 12:25

## 2024-12-03 RX ADMIN — APIXABAN 5 MG: 5 TABLET, FILM COATED ORAL at 20:34

## 2024-12-03 RX ADMIN — INSULIN LISPRO 3 UNITS: 100 INJECTION, SOLUTION INTRAVENOUS; SUBCUTANEOUS at 08:29

## 2024-12-03 RX ADMIN — AMIODARONE HYDROCHLORIDE 200 MG: 200 TABLET ORAL at 08:29

## 2024-12-03 NOTE — THERAPY TREATMENT NOTE
Subjective: Pt agreeable to therapeutic plan of care.  No new reports.      Objective:     Precautions - fall risk     Bed mobility - N/A or Not attempted.  Pt up in chair.   Transfers - Min-A STS and chair to/from BSC  Ambulation - 40 feet x2 (seated rest break required in between due to fatigue/weakness CGA and with rolling walker (2nd therapist pushing chair behind).   Static standing balance: CGA with use of RW  Dynamic standing balance: CGA with use of RW      Vitals: WNL  BP in sittin/53  BP in standin/51    Pain: 0 VAS   Location: N/A  Intervention for pain: N/A    Education: Provided education on the importance of mobility in the acute care setting and Verbal/Tactile Cues    Assessment: Ap Roe presents with functional mobility impairments which indicate the need for skilled intervention. Tolerating session today without incident. This date pt was able to increase his ambulation distance ambulating 40 feet x2. Pt required 1 seated rest break this date during ambulation.  BP stable.  Pt required min A for sit to stand.  Required assist with donning/doffing brief and socks.  If pt is approved for IRF, treatment will need to be broken down into smaller increments due to his low activity tolerance.  He did have notable fatigue when transferring to BSC from chair post ambulation requiring min A and cues for proper hand placement for safety. Balance improved with use of RW; however, balance still an issue as he is at risk for falls.  Gait speed reduced as well increasing risk for falls. Goals are not yet met, but progress is being made towards them. Will continue to follow and progress as tolerated.     Plan/Recommendations:   If medically appropriate, High Intensity Therapy recommended post-acute care. This is recommended as therapy feels the patient would require 5-6 days per week, 2-3 hours per day. At this time, inpatient rehabilitation (acute rehab) would be the first choice and SNF would  be second. Pt requires no DME at discharge.     Pt desires Inpatient Rehabilitation placement at discharge. Pt cooperative; agreeable to therapeutic recommendations and plan of care.       Basic Mobility 6-click:  Rollin = Total, A lot = 2, A little = 3; 4 = None  Supine>Sit:   1 = Total, A lot = 2, A little = 3; 4 = None   Sit>Stand with arms:  1 = Total, A lot = 2, A little = 3; 4 = None  Bed>Chair:   1 = Total, A lot = 2, A little = 3; 4 = None  Ambulate in room:  1 = Total, A lot = 2, A little = 3; 4 = None  3-5 Steps with railin = Total, A lot = 2, A little = 3; 4 = None  Score: 17    Post-Tx Position: Up in Chair, Alarms activated, and Call light and personal items within reach, daughter present, notified nursing   PPE: gloves    Therapy Charges for Today       Code Description Service Date Service Provider Modifiers Qty    96351861521 HC GAIT TRAINING EA 15 MIN 12/3/2024 Riddhi Vega, PT  1    78712437227 HC PT THERAPEUTIC ACT EA 15 MIN 12/3/2024 Riddhi Vega, PT  1    [id number]  PT NEUROMUSC RE EDUCATION EA 15 MIN 12/3/2024 Riddhi Vega, PT GP 1           PT Charges       Row Name 24 1010             Time Calculation    Start Time 0904  -EJ      Stop Time 0934  -EJ      Time Calculation (min) 30 min  -EJ      PT Received On 24  -EJ      PT - Next Appointment 24  -EJ         Time Calculation- PT    Total Timed Code Minutes- PT 30 minute(s)  -EJ                User Key  (r) = Recorded By, (t) = Taken By, (c) = Cosigned By      Initials Name Provider Type    Riddhi Reyes, PT Physical Therapist

## 2024-12-03 NOTE — PROGRESS NOTES
[facility] MEDICINE SERVICE  DAILY PROGRESS NOTE    NAME: Ap Roe  : 1/15/1928  MRN: [id number]      LOS: 6 days     PROVIDER OF SERVICE: Twyla Johnson MD    Chief Complaint: Weakness    Subjective:     Interval History:  History taken from: patient    Patient appears otherwise comfortable.  He is up and eating breakfast at the present time.  Wife and daughter present at bedside.  He slept well overnight.        Review of Systems:   Review of Systems   Constitutional: Negative.    Respiratory: Negative.     Cardiovascular: Negative.    Gastrointestinal: Negative.    Musculoskeletal: Negative.    Neurological: Negative.    Psychiatric/Behavioral: Negative.         Objective:     Vital Signs  Temp:  [97.3 °F (36.3 °C)-97.6 °F (36.4 °C)] 97.4 °F (36.3 °C)  Heart Rate:  [60-64] 60  Resp:  [16-18] 18  BP: (104-127)/(42-59) 110/42   Body mass index is 28.59 kg/m².    Physical Exam  Physical Exam  Constitutional:       General: He is awake.      Appearance: Normal appearance. He is well-developed and well-groomed.   HENT:      Head: Normocephalic and atraumatic.      Nose: Nose normal.      Mouth/Throat:      Mouth: Mucous membranes are moist.      Pharynx: Oropharynx is clear.   Eyes:      Extraocular Movements: Extraocular movements intact.      Conjunctiva/sclera: Conjunctivae normal.      Pupils: Pupils are equal, round, and reactive to light.   Cardiovascular:      Rate and Rhythm: Normal rate and regular rhythm.      Pulses: Normal pulses.      Heart sounds: Normal heart sounds.   Pulmonary:      Effort: Pulmonary effort is normal.      Breath sounds: Normal breath sounds.   Abdominal:      General: Abdomen is flat. Bowel sounds are normal.      Palpations: Abdomen is soft.   Musculoskeletal:         General: Normal range of motion.      Cervical back: Normal range of motion and neck supple.      Right lower leg: No edema.      Left lower leg: No edema.   Skin:     General: Skin is warm and dry.    Neurological:      General: No focal deficit present.      Mental Status: He is alert and oriented to person, place, and time. Mental status is at baseline.      Cranial Nerves: Cranial nerves 2-12 are intact.      Sensory: Sensation is intact.      Motor: Motor function is intact.      Comments: Bilateral hand tremors noted.   Psychiatric:         Mood and Affect: Mood normal.         Behavior: Behavior normal. Behavior is cooperative.         Thought Content: Thought content normal.         Judgment: Judgment normal.            Diagnostic Data    Results from last 7 days   Lab Units 12/02/24  1717 11/30/24  0832   WBC 10*3/mm3  --  5.77   HEMOGLOBIN g/dL  --  9.9*   HEMATOCRIT %  --  29.1*   PLATELETS 10*3/mm3  --  164   GLUCOSE mg/dL 276* 141*   CREATININE mg/dL 1.54* 1.18   BUN mg/dL 33* 25*   SODIUM mmol/L 123* 130*   POTASSIUM mmol/L 5.3* 3.9   AST (SGOT) U/L 34 27   ALT (SGPT) U/L 16 14   ALK PHOS U/L 102 75   BILIRUBIN mg/dL 0.5 0.6   ANION GAP mmol/L 8.8 10.7       No radiology results for the last day      I reviewed the patient's new clinical results.    Assessment/Plan:     Active and Resolved Problems  Active Hospital Problems    Diagnosis  POA    **Weakness [R53.1]  Yes      Resolved Hospital Problems   No resolved problems to display.     Status post fall at home  Generalized weakness  Hypomagnesemia   Diabetes mellitus  Anemia of chronic disease  Atrial fibrillation  Hypertension  Acute confusional state  Hyponatremia, chronic--with worsening this hospital admission  Acute kidney injury      Noted laboratory studies from yesterday.  Will consult nephrology.  Lasix remains on hold for now.  Discussed plan of care with patient's daughter present at bedside.  Patient would likely benefit from rehab at time of discharge.  He was fairly active prior to being admitted.    Etiology for hyponatremia remains unclear.  Will discuss with nephrology if CT imaging warranted.      VTE Prophylaxis:  Pharmacologic  & mechanical VTE prophylaxis orders are present.             Disposition Planning:     Barriers to Discharge: Insurance approval, improvement of hyponatremia/acute kidney injury  Anticipated Date of Discharge: 12/4/24  Place of Discharge: Rehab      Time: 35 minutes     Code Status and Medical Interventions: No CPR (Do Not Attempt to Resuscitate); Full Support   Ordered at: 11/28/24 1024     Level Of Support Discussed With:    Next of Kin (If No Surrogate)     Code Status (Patient has no pulse and is not breathing):    No CPR (Do Not Attempt to Resuscitate)     Medical Interventions (Patient has pulse or is breathing):    Full Support       Signature: Electronically signed by Twyla Johnson MD, 12/03/24, 10:01 EST.  [facility] Hospitalist Team

## 2024-12-03 NOTE — THERAPY TREATMENT NOTE
Subjective: Pt agreeable to therapeutic plan of care.  Cognition: oriented to Person, Place, Time, and Situation    Objective:     Precautions - Falls risk    Bed Mobility: N/A or Not attempted. In chair upon arrival  Functional Transfers: Min-A and with rolling walker     Balance: supported, static, dynamic, and standing CGA and with rolling walker  Functional Ambulation: CGA and with rolling walker 40 feet x2, seated rest break taken after first bout. Verbal cues required for walker navigation.     Lower Body Dressing: Max-A  ADL Position: supported sitting on BSC  ADL Comments: Socks and brief soiled, requiring a change into fresh. Pt very fatigued at this point and unable to provide much assistance to therapist.     Toileting: Max-A  ADL Position: supported sitting  ADL Comments: Assisted to BSC to urinate in seated position, however was not situated properly and missed the commode, urinating on floor and requiring total assist for cleanup.       Vitals: /53 and 123/51    Pain: 0 VAS  Location: NA  Interventions for pain: N/A  Education: Provided education on the importance of mobility in the acute care setting, Verbal/Tactile Cues, ADL training, Transfer Training, and Energy conservation strategies      Assessment: Ap Roe presents with ADL impairments affecting function including balance, coordination, endurance / activity tolerance, and strength. Patient motivated to participate in OT treatment this date. In chair upon arrival. BP assessed in seated and standing to rule out OH, vitals stable as noted above. CGA initially required to come to standing with RW, and pt able to ambulate 40 feet with RW and CGA, followed closely with a chair in case of need to sit abruptly. After 40 feet, a seated rest break was taken then he was able to ambulate an additional 40 feet back to his room albeit slowly and with min verbal cues. Upon return to chair, pt requested assistance to BSC due to urge to urinate.  Min A provided for this transfer as it was without RW. Pt had soiled brief then missed BSC when urinating, soiling socks. Max A required for changing soiled lower body clothing for clean, and dependent for cleaning floor after soiling with urine. Min A then needed again for transfer back to chair. After this session, he was very fatigued. If attending acute IP rehab, OT feels therapy bouts will need to be broken into shorter increments to build up to 3 hours a day via several short therapy sessions. Demonstrated functioning below baseline abilities indicate the need for continued skilled intervention while inpatient. Tolerating session today without incident. Will continue to follow and progress as tolerated.     Plan/Recommendations:   High Intensity Therapy recommended post-acute care. This is recommended as therapy feels the patient would require 5-6 days per week, 2-3 hours per day. At this time, inpatient rehabilitation (acute rehab) would be the first choice and SNF would be second.. Pt requires no DME at discharge.     Pt desires Inpatient Rehabilitation placement at discharge. Pt cooperative; agreeable to therapeutic recommendations and plan of care.     Modified [place]: N/A = No pre-op stroke/TIA    Post-Tx Position: Up in Chair, Alarms activated, and Call light and personal items within reach  PPE: gloves    Therapy Charges for Today       Code Description Service Date Service Provider Modifiers Qty    37609205421  OT THERAPEUTIC ACT EA 15 MIN 12/3/2024 Bill Whitley OT GO 2    [id number]  OT SELF CARE/MGMT/TRAIN EA 15 MIN 12/3/2024 Bill Whitley OT GO 1           Time Calculation- OT       Row Name 12/03/24 1000             Time Calculation- OT    OT Start Time 0900  -      OT Stop Time 0933  -      OT Time Calculation (min) 33 min  -      Total Timed Code Minutes- OT 33 minute(s)  -      OT Received On 12/03/24  -      OT - Next Appointment 12/04/24  -                User Key  (r) =  Recorded By, (t) = Taken By, (c) = Cosigned By      Initials Name Provider Type    Bill Jack, OT Occupational Therapist

## 2024-12-03 NOTE — PLAN OF CARE
Goal Outcome Evaluation:        Assessment: Ap Roe presents with functional mobility impairments which indicate the need for skilled intervention. Tolerating session today without incident. This date pt was able to increase his ambulation distance ambulating 40 feet x2. Pt required 1 seated rest break this date during ambulation.  BP stable.  Pt required min A for sit to stand.  Required assist with donning/doffing brief and socks.  If pt is approved for IRF, treatment will need to be broken down into smaller increments due to his low activity tolerance.  He did have notable fatigue when transferring to [facility] from chair post ambulation requiring min A and cues for proper hand placement for safety. Balance improved with use of RW; however, balance still an issue as he is at risk for falls.  Gait speed reduced as well increasing risk for falls. Goals are not yet met, but progress is being made towards them. Will continue to follow and progress as tolerated.     Plan/Recommendations:   If medically appropriate, High Intensity Therapy recommended post-acute care. This is recommended as therapy feels the patient would require 5-6 days per week, 2-3 hours per day. At this time, inpatient rehabilitation (acute rehab) would be the first choice and SNF would be second. Pt requires no DME at discharge.     Pt desires Inpatient Rehabilitation placement at discharge. Pt cooperative; agreeable to therapeutic recommendations and plan of care.

## 2024-12-03 NOTE — CONSULTS
INITIAL CONSULT NOTE      Patient Name: Ap Roe  : 1/15/1928  MRN: [id number]  Primary Care Physician: Elva Mota MD  Date of admission: 2024    Patient Care Team:  Elva Mota MD as PCP - General (Internal Medicine)        Reason for Consult:       Hyponatremia hyperkalemia and GI  Subjective   History of Present Illness:   Chief Complaint:   Chief Complaint   Patient presents with    Fall     HISTORY:  Ap Roe is a 89 y.o. male with past medical history of atrial fibrillation, diastolic heart failure, grade 1, history of diabetes, coronary artery disease, history of chronic hyponatremia,. who presents with fall as he landed on his knees.  With some hip pain.  Sodium level was 130 at the time of admission dropped to 123 today creatinine that was around 1.2 at baseline today 1.5.  Patient has significant peripheral edema.  Has 2.  Grade 1 diastolic dysfunctions.  Potassium level is also slightly on the high at 5.3.  Patient is extremely lethargic got a detailed history from patient daughter who is a physician and knows about her father condition in detail          Review of systems:  All other review of system unremarkable at this time  Constitutional: No fever, no chills, no lethargy, no weakness.  HEENT:  No headache, otalgia, itchy eyes, nasal discharge or sore throat.  Cardiac:  No chest pain, dyspnea, orthopnea or PND.  Chest:              No cough, phlegm or wheezing.  Abdomen:  No abdominal pain, nausea or vomiting.  Neuro:  No focal weakness, abnormal movements or seizure-like activity.  :   No hematuria, no pyuria, no dysuria, no flank pain.  ROS was otherwise negative except as mentioned in the [place].       Personal History:     Past Medical History:   Past Medical History:   Diagnosis Date    Arthritis     Diabetes 1.5, managed as type 2     Heart disease        Surgical History:      Past Surgical History:   Procedure Laterality Date    CHOLECYSTECTOMY      HAND  SURGERY      MELANOMA WIDE LOCAL EXCISION Right     cheek    PACEMAKER IMPLANTATION      SKIN GRAFT      scalp       Family History: family history is not on file. Otherwise pertinent FHx was reviewed and unremarkable.     Social History:  reports that he has never smoked. He has never used smokeless tobacco. He reports that he does not drink alcohol and does not use drugs.    Medications:  Prior to Admission medications    Medication Sig Start Date End Date Taking? Authorizing Provider   amiodarone (PACERONE) 200 MG tablet Take 1 tablet by mouth Daily. 10/9/24  Yes Ayse Hawkins MD   Eliquis 5 MG tablet tablet Take 1 tablet by mouth Every 12 (Twelve) Hours. 10/5/24  Yes Ayse Hawkins MD   furosemide (LASIX) 20 MG tablet Take 1 tablet by mouth Daily.   Yes Ayse Hawkins MD   losartan (COZAAR) 100 MG tablet Take 1 tablet by mouth Daily. 10/4/24  Yes Ayse Hawkins MD   metFORMIN (GLUCOPHAGE) 1000 MG tablet Take 1 tablet by mouth 2 (Two) Times a Day With Meals.   Yes Ayse Hawkins MD   pioglitazone (ACTOS) 30 MG tablet Take 1 tablet by mouth Daily. 8/29/24  Yes Ayse Hawkins MD   propranolol (INDERAL) 80 MG tablet Take 1 tablet by mouth Daily. 10/8/24  Yes Ayse Hawkins MD   simvastatin (ZOCOR) 20 MG tablet Take 1 tablet by mouth Daily. 10/4/24  Yes Ayse Hawkins MD   tamsulosin (FLOMAX) 0.4 MG capsule 24 hr capsule Take 1 capsule by mouth Daily. 10/4/24  Yes Ayse Hawkins MD     Scheduled Meds:amiodarone, 200 mg, Oral, Daily  apixaban, 5 mg, Oral, Q12H  atorvastatin, 10 mg, Oral, Daily  [Held by provider] furosemide, 20 mg, Oral, Every Other Day  insulin lispro, 2-7 Units, Subcutaneous, 4x Daily AC & at Bedtime  losartan, 100 mg, Oral, Daily  pioglitazone, 30 mg, Oral, Daily  propranolol, 80 mg, Oral, Daily  sodium chloride, 10 mL, Intravenous, Q12H  tamsulosin, 0.4 mg, Oral, Daily  traZODone, 25 mg, Oral, Nightly      Continuous Infusions:   PRN  Meds:  senna-docusate sodium **AND** polyethylene glycol **AND** bisacodyl **AND** bisacodyl    dextrose    dextrose    glucagon (human recombinant)    Magnesium Standard Dose Replacement - Follow Nurse / BPA Driven Protocol    melatonin    [COMPLETED] Insert Peripheral IV **AND** sodium chloride    sodium chloride    sodium chloride  Allergies:    Allergies   Allergen Reactions    Celebrex [Celecoxib] Hives       Objective   Exam:     Vital Signs  Temp:  [97.3 °F (36.3 °C)-97.6 °F (36.4 °C)] 97.4 °F (36.3 °C)  Heart Rate:  [60-64] 60  Resp:  [16-18] 18  BP: (104-127)/(42-59) 110/42  SpO2:  [92 %-100 %] 98 %  on   ;   Device (Oxygen Therapy): room air  Body mass index is 28.59 kg/m².  EXAM  General: Elderly white male in no acute distress.    Head:      Normocephalic and atraumatic.    Eyes:      PERRL/EOM intact, conjunctivae and sclerae clear without nystagmus.    Neck:      No masses, thyromegaly,  trachea central   Lungs:    Clear bilaterally to auscultation.    Heart:      Regular rate and rhythm, no murmur no gallop  Abd:        Soft, nontender, not distended, bowel sounds positive, no shifting dullness.  Msk:        No deformity or scoliosis noted of thoracic or lumbar spine.    Pulses:   Pulses normal in all 4 extremities.    Extremities:        No cyanosis or clubbing--+2 edema.    Neuro:    No focal deficits.   alert oriented x3  Skin:       Intact without lesions or rashes.    Psych:    Alert and cooperative; normal mood and affect; normal attention span       Results Review:  I have personally reviewed most recent Data :  BMP @LABNT(creatinine:10)  CBC    Results from last 7 days   Lab Units 11/30/24  0832 11/29/24  0044 11/27/24  0336 11/26/24  1732   WBC 10*3/mm3 5.77 5.09 5.94 6.39   HEMOGLOBIN g/dL 9.9* 10.3* 10.8* 11.3*   PLATELETS 10*3/mm3 164 200 183 190     CMP   Results from last 7 days   Lab Units 12/02/24  1717 11/30/24  0832 11/29/24  0044 11/28/24  0554 11/27/24  0336 11/26/24  173    SODIUM mmol/L 123* 130* 129* 132* 130* 132*   POTASSIUM mmol/L 5.3* 3.9 4.1 3.6 3.6 4.5   CHLORIDE mmol/L 95* 97* 94* 94* 92* 93*   CO2 mmol/L 19.2* 22.3 24.6 25.2 26.6 24.7   BUN mg/dL 33* 25* 24* 21 21 23   CREATININE mg/dL 1.54* 1.18 1.38* 1.22 1.26 1.27   GLUCOSE mg/dL 276* 141* 108* 136* 173* 148*   ALBUMIN g/dL 3.4* 3.3*  --   --  3.7 4.0   BILIRUBIN mg/dL 0.5 0.6  --   --  0.8 0.8   ALK PHOS U/L 102 75  --   --  85 101   AST (SGOT) U/L 34 27  --   --  29 29   ALT (SGPT) U/L 16 14  --   --  16 16     ABG      XR Hip With or Without Pelvis 2 - 3 View Left    Result Date: 11/26/2024  Impression: Negative for acute osseous abnormality. Electronically Signed: Damian Keen MD  11/26/2024 5:50 PM EST  Workstation ID: [id number]    XR Knee 1 or 2 View Bilateral    Result Date: 11/26/2024  Impression: 1.Degenerative changes in both knees. 2.No fractures are identified. 3.Soft tissue swelling laterally on the left. Electronically Signed: Jose Cassidy MD  11/26/2024 4:18 PM EST  Workstation ID: [id number]           Assessment & Plan   Assessment and Plan:         Weakness    ASSESSMENT:  Acute kidney injury: Likely related to angiotensin receptor blocker and may be some cardiorenal syndrome specially with the presence of diastolic dysfunctions  Congestive heart failure with grade 1 diastolic dysfunction  History of hypertension  History of diabetes mellitus  Atrial fibrillation          PLAN :     Patient started on Bumex 1 mg x 1 as patient has significant edema and with some crackles in the lungs has grade 1 diastolic dysfunctions  Hyponatremia hyperkalemia some increasing creatinine also related to cardiorenal syndrome with some volume overload  Will give a dose of Lokelma for hyperkalemia  Follow-up with repeat lab around 4 PM  If sodium level improving okay to be discharged  1 dose of urea to improve urine output and sodium  Manage blood sugar aggressively that also contributing to some hyponatremia corrected sodium  may be around 1 25-1 26 today  I am decreasing losartan to 50 mg a day as blood pressure is slightly soft and patient potassium is high and creatinine is high  Thank you for letting me participate      Darrell Perez MD  [facility]  12/3/2024  10:11 EST

## 2024-12-03 NOTE — PLAN OF CARE
Goal Outcome Evaluation:              Outcome Evaluation: Pt wife has been at bedside all night,  pt family has refused iv placement and labs,  also they request that pt not be woke up for vitals, no other issues at this time

## 2024-12-03 NOTE — CASE MANAGEMENT/SOCIAL WORK
"Continued Stay Note   Royce     Patient Name: Ap Roe  MRN: [id number]  Today's Date: 12/3/2024    Admit Date: 11/26/2024    Plan: [facility] (accepted. P2P 12/3 resulted in denial. Appeal to be started by [facility] 12/4. SNF choices pending.   Discharge Plan       Row Name 12/03/24 1426       Plan    Plan [facility] (accepted. P2P 12/3 resulted in denial. Appeal to be started by [facility] 12/4. SNF choices pending.    Plan Comments  received notification that P2P on 12/3 resulted in denial for [facility]. Family aware and requesting appeal from [facility] (to be started on 12/4 by [facility] due to [facility] merger on 12/3.) Family made aware that this appeal would likely lead to a denial and choices for SNF placement at discharge would need to be obtained for rehab at discharge. [facility] and all [facility] buildings denied by [employer]  for \"pt refusal of care and family refusal to many things regarding his care as noted.\" Awaiting SNF choices from daughter Joyce.             Nuzhat Us RN      Office phone: [phone]  Office fax: [phone]   "

## 2024-12-03 NOTE — NURSING NOTE
Pt is resting in recliner with eyes closed. Daughter at bedside keeps telling Pt to wake up, open his eyes. Pt b/p 110/42, held losartan and propranolol. Daughter at bedside wants propranolol given. Explained to her that order parameters are not met. Pt's diastolic b/p too low (below 60) Daughter stated that propranolol is prescribed for Pt's tremors, not for his b/p. Daughter requested I message Dr. Johnson. Messaged MD regarding holding propranolol.

## 2024-12-03 NOTE — PLAN OF CARE
Goal Outcome Evaluation:              Problem: Adult Inpatient Plan of Care  Goal: Plan of Care Review  Outcome: Progressing  Goal: Patient-Specific Goal (Individualized)  Outcome: Progressing  Goal: Absence of Hospital-Acquired Illness or Injury  Outcome: Progressing  Intervention: Identify and Manage Fall Risk  Recent Flowsheet Documentation  Taken 12/3/2024 1519 by Jessenia Alfonso RN  Safety Promotion/Fall Prevention:   activity supervised   assistive device/personal items within reach   clutter free environment maintained   fall prevention program maintained   nonskid shoes/slippers when out of bed   room organization consistent   safety round/check completed  Intervention: Prevent Skin Injury  Recent Flowsheet Documentation  Taken 12/3/2024 1519 by Jessenia Alfonso RN  Body Position: position changed independently  Intervention: Prevent and Manage VTE (Venous Thromboembolism) Risk  Recent Flowsheet Documentation  Taken 12/3/2024 1519 by Jessenia Alfonso RN  VTE Prevention/Management:   SCDs (sequential compression devices) off   patient refused intervention  Intervention: Prevent Infection  Recent Flowsheet Documentation  Taken 12/3/2024 1519 by Jessenia Alfonso RN  Infection Prevention:   hand hygiene promoted   personal protective equipment utilized   rest/sleep promoted   single patient room provided   environmental surveillance performed  Goal: Optimal Comfort and Wellbeing  Outcome: Progressing  Intervention: Monitor Pain and Promote Comfort  Recent Flowsheet Documentation  Taken 12/3/2024 1519 by Jessenia Alfonso RN  Pain Management Interventions: pain management plan reviewed with patient/caregiver  Intervention: Provide Person-Centered Care  Recent Flowsheet Documentation  Taken 12/3/2024 1519 by Jessenia Alfonso RN  Trust Relationship/Rapport:   care explained   choices provided  Goal: Readiness for Transition of Care  Outcome: Progressing     Problem: Fall Injury Risk  Goal: Absence of Fall and  Fall-Related Injury  Outcome: Progressing  Intervention: Identify and Manage Contributors  Recent Flowsheet Documentation  Taken 12/3/2024 1519 by Jessenia Alfonso RN  Medication Review/Management: medications reviewed  Self-Care Promotion:   independence encouraged   BADL personal objects within reach  Intervention: Promote Injury-Free Environment  Recent Flowsheet Documentation  Taken 12/3/2024 1519 by Jessenia Alfonso RN  Safety Promotion/Fall Prevention:   activity supervised   assistive device/personal items within reach   clutter free environment maintained   fall prevention program maintained   nonskid shoes/slippers when out of bed   room organization consistent   safety round/check completed     Problem: Confusion Acute  Goal: Optimal Cognitive Function  Outcome: Progressing  Intervention: Minimize Contributing Factors  Recent Flowsheet Documentation  Taken 12/3/2024 1519 by Jessenia Alfonso RN  Reorientation Measures:   clock in view   calendar in view  Communication Support Strategies: active listening utilized

## 2024-12-03 NOTE — PLAN OF CARE
Assessment: Ap Roe presents with ADL impairments affecting function including balance, coordination, endurance / activity tolerance, and strength. Patient motivated to participate in OT treatment this date. In chair upon arrival. BP assessed in seated and standing to rule out [place], vitals stable as noted above. CGA initially required to come to standing with RW, and pt able to ambulate 40 feet with RW and CGA, followed closely with a chair in case of need to sit abruptly. After 40 feet, a seated rest break was taken then he was able to ambulate an additional 40 feet back to his room albeit slowly and with min verbal cues. Upon return to chair, pt requested assistance to BSC due to urge to urinate. Min A provided for this transfer as it was without RW. Pt had soiled brief then missed BSC when urinating, soiling socks. Max A required for changing soiled lower body clothing for clean, and dependent for cleaning floor after soiling with urine. Min A then needed again for transfer back to chair. After this session, he was very fatigued. If attending acute IP rehab, OT feels therapy bouts will need to be broken into shorter increments to build up to 3 hours a day via several short therapy sessions. Demonstrated functioning below baseline abilities indicate the need for continued skilled intervention while inpatient. Tolerating session today without incident. Will continue to follow and progress as tolerated.      Plan/Recommendations:   High Intensity Therapy recommended post-acute care. This is recommended as therapy feels the patient would require 5-6 days per week, 2-3 hours per day. At this time, inpatient rehabilitation (acute rehab) would be the first choice and SNF would be second.. Pt requires no DME at discharge.      Pt desires Inpatient Rehabilitation placement at discharge. Pt cooperative; agreeable to therapeutic recommendations and plan of care.

## 2024-12-03 NOTE — NURSING NOTE
Pt's daughter came in to pt's room at 0730, turned on lights on in room and woke Pt up for breakfast. Pt's daughter then came to nurses station and asked that Pt be put in chair. Explained to daughter that This nurse would find aide and we would help transfer Pt to chair as soon as possible.

## 2024-12-04 ENCOUNTER — APPOINTMENT (OUTPATIENT)
Dept: CARDIOLOGY | Facility: HOSPITAL | Age: 89
DRG: 947 | End: 2024-12-04
Payer: MEDICARE

## 2024-12-04 LAB
ANION GAP SERPL CALCULATED.3IONS-SCNC: 10.9 MMOL/L (ref 5–15)
BH CV ECHO MEAS - AO MAX PG: 14.4 MMHG
BH CV ECHO MEAS - AO MEAN PG: 7 MMHG
BH CV ECHO MEAS - AO V2 MAX: 190 CM/SEC
BH CV ECHO MEAS - AO V2 VTI: 41.8 CM
BH CV ECHO MEAS - AVA(I,D): 2.43 CM2
BH CV ECHO MEAS - EDV(MOD-SP4): 71.2 ML
BH CV ECHO MEAS - EF(MOD-BP): 51 %
BH CV ECHO MEAS - EF(MOD-SP4): 51.4 %
BH CV ECHO MEAS - ESV(MOD-SP4): 34.6 ML
BH CV ECHO MEAS - LAT PEAK E' VEL: 11.2 CM/SEC
BH CV ECHO MEAS - LV DIASTOLIC VOL/BSA (35-75): 36.8 CM2
BH CV ECHO MEAS - LV MAX PG: 7.2 MMHG
BH CV ECHO MEAS - LV MEAN PG: 4 MMHG
BH CV ECHO MEAS - LV SYSTOLIC VOL/BSA (12-30): 17.9 CM2
BH CV ECHO MEAS - LV V1 MAX: 134 CM/SEC
BH CV ECHO MEAS - LV V1 VTI: 29.3 CM
BH CV ECHO MEAS - LVOT AREA: 3.5 CM2
BH CV ECHO MEAS - LVOT DIAM: 2.1 CM
BH CV ECHO MEAS - MED PEAK E' VEL: 6.9 CM/SEC
BH CV ECHO MEAS - MV A MAX VEL: 151 CM/SEC
BH CV ECHO MEAS - MV DEC SLOPE: 200 CM/SEC2
BH CV ECHO MEAS - MV DEC TIME: 0.27 SEC
BH CV ECHO MEAS - MV E MAX VEL: 104 CM/SEC
BH CV ECHO MEAS - MV E/A: 0.69
BH CV ECHO MEAS - MV MAX PG: 8.8 MMHG
BH CV ECHO MEAS - MV MEAN PG: 3 MMHG
BH CV ECHO MEAS - MV P1/2T: 146.2 MSEC
BH CV ECHO MEAS - MV V2 VTI: 47.9 CM
BH CV ECHO MEAS - MVA(P1/2T): 1.51 CM2
BH CV ECHO MEAS - MVA(VTI): 2.12 CM2
BH CV ECHO MEAS - PA ACC TIME: 0.11 SEC
BH CV ECHO MEAS - PA V2 MAX: 113 CM/SEC
BH CV ECHO MEAS - RAP SYSTOLE: 3 MMHG
BH CV ECHO MEAS - RV MAX PG: 3 MMHG
BH CV ECHO MEAS - RV V1 MAX: 87 CM/SEC
BH CV ECHO MEAS - RV V1 VTI: 20 CM
BH CV ECHO MEAS - RVSP: 27 MMHG
BH CV ECHO MEAS - SV(LVOT): 101.5 ML
BH CV ECHO MEAS - SV(MOD-SP4): 36.6 ML
BH CV ECHO MEAS - SVI(LVOT): 52.4 ML/M2
BH CV ECHO MEAS - SVI(MOD-SP4): 18.9 ML/M2
BH CV ECHO MEAS - TAPSE (>1.6): 2.14 CM
BH CV ECHO MEAS - TR MAX PG: 24 MMHG
BH CV ECHO MEAS - TR MAX VEL: 245 CM/SEC
BH CV ECHO MEASUREMENTS AVERAGE E/E' RATIO: 11.49
BH CV XLRA - TDI S': 10.4 CM/SEC
BUN SERPL-MCNC: 34 MG/DL (ref 8–23)
BUN/CREAT SERPL: 23.3 (ref 7–25)
CALCIUM SPEC-SCNC: 9.3 MG/DL (ref 8.2–9.6)
CHLORIDE SERPL-SCNC: 95 MMOL/L (ref 98–107)
CO2 SERPL-SCNC: 22.1 MMOL/L (ref 22–29)
CREAT SERPL-MCNC: 1.46 MG/DL (ref 0.76–1.27)
DEPRECATED RDW RBC AUTO: 49 FL (ref 37–54)
EGFRCR SERPLBLD CKD-EPI 2021: 43.7 ML/MIN/1.73
ERYTHROCYTE [DISTWIDTH] IN BLOOD BY AUTOMATED COUNT: 14.4 % (ref 12.3–15.4)
GLUCOSE BLDC GLUCOMTR-MCNC: 171 MG/DL (ref 70–105)
GLUCOSE BLDC GLUCOMTR-MCNC: 183 MG/DL (ref 70–105)
GLUCOSE BLDC GLUCOMTR-MCNC: 233 MG/DL (ref 70–105)
GLUCOSE BLDC GLUCOMTR-MCNC: 241 MG/DL (ref 70–105)
GLUCOSE SERPL-MCNC: 175 MG/DL (ref 65–99)
HCT VFR BLD AUTO: 31.2 % (ref 37.5–51)
HGB BLD-MCNC: 10.6 G/DL (ref 13–17.7)
LEFT ATRIUM VOLUME INDEX: 36.1 ML/M2
MAGNESIUM SERPL-MCNC: 1.7 MG/DL (ref 1.7–2.3)
MCH RBC QN AUTO: 31.7 PG (ref 26.6–33)
MCHC RBC AUTO-ENTMCNC: 34 G/DL (ref 31.5–35.7)
MCV RBC AUTO: 93.4 FL (ref 79–97)
PHOSPHATE SERPL-MCNC: 4.1 MG/DL (ref 2.5–4.5)
PLATELET # BLD AUTO: 184 10*3/MM3 (ref 140–450)
PMV BLD AUTO: 8 FL (ref 6–12)
POTASSIUM SERPL-SCNC: 4.8 MMOL/L (ref 3.5–5.2)
RBC # BLD AUTO: 3.34 10*6/MM3 (ref 4.14–5.8)
SINUS: 2.6 CM
SODIUM SERPL-SCNC: 128 MMOL/L (ref 136–145)
STJ: 1.7 CM
WBC NRBC COR # BLD AUTO: 5.67 10*3/MM3 (ref 3.4–10.8)

## 2024-12-04 PROCEDURE — 93306 TTE W/DOPPLER COMPLETE: CPT

## 2024-12-04 PROCEDURE — 63710000001 INSULIN LISPRO (HUMAN) PER 5 UNITS: Performed by: PHYSICIAN ASSISTANT

## 2024-12-04 PROCEDURE — 80048 BASIC METABOLIC PNL TOTAL CA: CPT | Performed by: NURSE PRACTITIONER

## 2024-12-04 PROCEDURE — 83735 ASSAY OF MAGNESIUM: CPT | Performed by: NURSE PRACTITIONER

## 2024-12-04 PROCEDURE — 97530 THERAPEUTIC ACTIVITIES: CPT

## 2024-12-04 PROCEDURE — 93306 TTE W/DOPPLER COMPLETE: CPT | Performed by: INTERNAL MEDICINE

## 2024-12-04 PROCEDURE — 97116 GAIT TRAINING THERAPY: CPT

## 2024-12-04 PROCEDURE — 82948 REAGENT STRIP/BLOOD GLUCOSE: CPT | Performed by: PHYSICIAN ASSISTANT

## 2024-12-04 PROCEDURE — 84100 ASSAY OF PHOSPHORUS: CPT | Performed by: NURSE PRACTITIONER

## 2024-12-04 PROCEDURE — 85027 COMPLETE CBC AUTOMATED: CPT | Performed by: NURSE PRACTITIONER

## 2024-12-04 PROCEDURE — 82948 REAGENT STRIP/BLOOD GLUCOSE: CPT

## 2024-12-04 PROCEDURE — 97112 NEUROMUSCULAR REEDUCATION: CPT

## 2024-12-04 PROCEDURE — 97110 THERAPEUTIC EXERCISES: CPT

## 2024-12-04 RX ORDER — FUROSEMIDE 20 MG/1
20 TABLET ORAL
Status: DISCONTINUED | OUTPATIENT
Start: 2024-12-04 | End: 2024-12-05

## 2024-12-04 RX ADMIN — FUROSEMIDE 20 MG: 20 TABLET ORAL at 18:13

## 2024-12-04 RX ADMIN — ATORVASTATIN CALCIUM 10 MG: 10 TABLET ORAL at 09:24

## 2024-12-04 RX ADMIN — TAMSULOSIN HYDROCHLORIDE 0.4 MG: 0.4 CAPSULE ORAL at 09:24

## 2024-12-04 RX ADMIN — INSULIN LISPRO 3 UNITS: 100 INJECTION, SOLUTION INTRAVENOUS; SUBCUTANEOUS at 11:41

## 2024-12-04 RX ADMIN — PIOGLITAZONE 30 MG: 30 TABLET ORAL at 09:21

## 2024-12-04 RX ADMIN — INSULIN LISPRO 2 UNITS: 100 INJECTION, SOLUTION INTRAVENOUS; SUBCUTANEOUS at 18:13

## 2024-12-04 RX ADMIN — FUROSEMIDE 20 MG: 20 TABLET ORAL at 09:20

## 2024-12-04 RX ADMIN — INSULIN LISPRO 3 UNITS: 100 INJECTION, SOLUTION INTRAVENOUS; SUBCUTANEOUS at 20:59

## 2024-12-04 RX ADMIN — PROPRANOLOL HYDROCHLORIDE 80 MG: 20 TABLET ORAL at 09:24

## 2024-12-04 RX ADMIN — AMIODARONE HYDROCHLORIDE 200 MG: 200 TABLET ORAL at 09:21

## 2024-12-04 RX ADMIN — APIXABAN 5 MG: 5 TABLET, FILM COATED ORAL at 20:59

## 2024-12-04 RX ADMIN — LOSARTAN POTASSIUM 50 MG: 50 TABLET, FILM COATED ORAL at 09:21

## 2024-12-04 RX ADMIN — APIXABAN 5 MG: 5 TABLET, FILM COATED ORAL at 09:24

## 2024-12-04 NOTE — PROGRESS NOTES
[facility] MEDICINE SERVICE  DAILY PROGRESS NOTE    NAME: Ap Roe  : 1/15/1928  MRN: [id number]      LOS: 7 days     PROVIDER OF SERVICE: Twyla Johnson MD    Chief Complaint: Weakness    Subjective:     Interval History:  History taken from: patient    Patient is otherwise doing well this morning.  Comfortable.  No acute overnight events.        Review of Systems:   Review of Systems   Constitutional: Negative.    Respiratory: Negative.     Cardiovascular: Negative.    Gastrointestinal: Negative.    Musculoskeletal: Negative.    Neurological: Negative.    Psychiatric/Behavioral: Negative.         Objective:     Vital Signs  Temp:  [97.5 °F (36.4 °C)-97.7 °F (36.5 °C)] 97.5 °F (36.4 °C)  Heart Rate:  [60-68] 68  Resp:  [16-20] 16  BP: (104-141)/(52-65) 141/65   Body mass index is 26.85 kg/m².    Physical Exam  Physical Exam  Constitutional:       General: He is awake.      Appearance: Normal appearance. He is well-developed and well-groomed.   HENT:      Head: Normocephalic and atraumatic.      Nose: Nose normal.      Mouth/Throat:      Mouth: Mucous membranes are moist.      Pharynx: Oropharynx is clear.   Eyes:      Extraocular Movements: Extraocular movements intact.      Conjunctiva/sclera: Conjunctivae normal.      Pupils: Pupils are equal, round, and reactive to light.   Cardiovascular:      Rate and Rhythm: Normal rate and regular rhythm.      Pulses: Normal pulses.      Heart sounds: Normal heart sounds.   Pulmonary:      Effort: Pulmonary effort is normal.      Breath sounds: Normal breath sounds.   Abdominal:      General: Abdomen is flat. Bowel sounds are normal.      Palpations: Abdomen is soft.   Musculoskeletal:         General: Normal range of motion.      Cervical back: Normal range of motion and neck supple.      Right lower leg: No edema.      Left lower leg: No edema.   Skin:     General: Skin is warm and dry.   Neurological:      General: No focal deficit present.      Mental  Status: He is alert and oriented to person, place, and time. Mental status is at baseline.      Cranial Nerves: Cranial nerves 2-12 are intact.      Sensory: Sensation is intact.      Motor: Motor function is intact.   Psychiatric:         Mood and Affect: Mood normal.         Behavior: Behavior normal. Behavior is cooperative.         Thought Content: Thought content normal.         Judgment: Judgment normal.            Diagnostic Data    Results from last 7 days   Lab Units 12/04/24  0301 12/02/24  1717   WBC 10*3/mm3 5.67  --    HEMOGLOBIN g/dL 10.6*  --    HEMATOCRIT % 31.2*  --    PLATELETS 10*3/mm3 184  --    GLUCOSE mg/dL 175* 276*   CREATININE mg/dL 1.46* 1.54*   BUN mg/dL 34* 33*   SODIUM mmol/L 128* 123*   POTASSIUM mmol/L 4.8 5.3*   AST (SGOT) U/L  --  34   ALT (SGPT) U/L  --  16   ALK PHOS U/L  --  102   BILIRUBIN mg/dL  --  0.5   ANION GAP mmol/L 10.9 8.8       No radiology results for the last day      I reviewed the patient's new clinical results.    Assessment/Plan:     Active and Resolved Problems  Active Hospital Problems    Diagnosis  POA    **Weakness [R53.1]  Yes      Resolved Hospital Problems   No resolved problems to display.   Status post fall at home  Generalized weakness  Hypomagnesemia   Diabetes mellitus  Anemia of chronic disease  Atrial fibrillation  Hypertension  Acute confusional state  Hyponatremia, chronic--with worsening this hospital admission  Acute kidney injury       Noted laboratory studies.  Sodium is risen to 128.  Nephrology following.  Bumex dosage given yesterday.  Patient has now been resumed back on Lasix.  Echocardiogram conducted this morning.  Results currently pending.    Confusion is now resolved.  It appears it was likely secondary to sleep deprivation.  Patient is now off trazodone as well.    Given independence with ADLs prior to admission patient would likely benefit from acute inpatient rehab as well as up to 15 hours of therapy on a weekly basis.  Patient  did have an increased functional status prior to admission after reviewing and/or discussing with patient's daughter who was present at bedside.  Patient was independent driving as well as managing his own medications at home.  He was otherwise doing well.  Given prolonged hospital course and daily deconditioning I would strongly advised patient to be discharged to an acute inpatient rehab facility.    While being at the acute inpatient rehab facility patient would benefit from daily labs as well as 3 hours of therapy on a daily basis.        VTE Prophylaxis:  Pharmacologic & mechanical VTE prophylaxis orders are present.             Disposition Planning:     Barriers to Discharge: Insurance approval for rehab placement  Anticipated Date of Discharge: 12/6/2024  Place of Discharge: Rehab versus home pending above.      Time: 25 minutes     Code Status and Medical Interventions: No CPR (Do Not Attempt to Resuscitate); Full Support   Ordered at: 11/28/24 1024     Level Of Support Discussed With:    Next of Kin (If No Surrogate)     Code Status (Patient has no pulse and is not breathing):    No CPR (Do Not Attempt to Resuscitate)     Medical Interventions (Patient has pulse or is breathing):    Full Support       Signature: Electronically signed by Twyla Johnson MD, 12/04/24, 09:45 EST.  [facility], [place], operated by [facility] Hospitalist Team

## 2024-12-04 NOTE — PROGRESS NOTES
PROGRESS NOTE      Patient Name: Ap Roe  : 1/15/1928  MRN: [id number]  Primary Care Physician: Elva Mota MD  Date of admission: 2024    Patient Care Team:  Elva Mota MD as PCP - General (Internal Medicine)        Subjective   Subjective:     Patient seen and examined   NAD noted  Creatinine essentially unchanged    Review of systems:  ROS negative      Allergies:    Allergies   Allergen Reactions    Celebrex [Celecoxib] Hives       Objective   Exam:     Vital Signs  Temp:  [97.5 °F (36.4 °C)-97.7 °F (36.5 °C)] 97.5 °F (36.4 °C)  Heart Rate:  [60-68] 68  Resp:  [16-20] 16  BP: (104-141)/(52-65) 141/65  SpO2:  [95 %-98 %] 97 %  on   ;   Device (Oxygen Therapy): room air  Body mass index is 26.85 kg/m².    General:   male in no acute distress.    Head:      Normocephalic and atraumatic.    Eyes:      PERRL/EOM intact, conjunctivae and sclerae clear without nystagmus.    Neck:      No masses, thyromegaly,  trachea central with normal respiratory effort   Lungs:    Clear bilaterally to auscultation.    Heart:      Regular rate and rhythm, no murmur no gallop  Abd:        Soft, nontender, not distended, bowel sounds positive, no shifting dullness   Pulses:   Pulses palpable  Extr:        No cyanosis or clubbing--+edema.    Neuro:    No focal deficits.   alert oriented x3  Skin:       Intact without lesions or rashes.    Psych:    Alert and cooperative; normal mood and affect; .      Results Review:  I have personally reviewed most recent Data :  CBC    Results from last 7 days   Lab Units 24  0301 24  0832 24  0044   WBC 10*3/mm3 5.67 5.77 5.09   HEMOGLOBIN g/dL 10.6* 9.9* 10.3*   PLATELETS 10*3/mm3 184 164 200     CMP   Results from last 7 days   Lab Units 24  0301 24  1717 24  0832 24  0044 24  0554   SODIUM mmol/L 128* 123* 130* 129* 132*   POTASSIUM mmol/L 4.8 5.3* 3.9 4.1 3.6   CHLORIDE mmol/L 95* 95* 97* 94* 94*   CO2 mmol/L 22.1  19.2* 22.3 24.6 25.2   BUN mg/dL 34* 33* 25* 24* 21   CREATININE mg/dL 1.46* 1.54* 1.18 1.38* 1.22   GLUCOSE mg/dL 175* 276* 141* 108* 136*   ALBUMIN g/dL  --  3.4* 3.3*  --   --    BILIRUBIN mg/dL  --  0.5 0.6  --   --    ALK PHOS U/L  --  102 75  --   --    AST (SGOT) U/L  --  34 27  --   --    ALT (SGPT) U/L  --  16 14  --   --      ABG      No radiology results for the last day      Scheduled Meds:amiodarone, 200 mg, Oral, Daily  apixaban, 5 mg, Oral, Q12H  atorvastatin, 10 mg, Oral, Daily  furosemide, 20 mg, Oral, Daily  insulin lispro, 2-7 Units, Subcutaneous, 4x Daily AC & at Bedtime  losartan, 50 mg, Oral, Daily  pioglitazone, 30 mg, Oral, Daily  propranolol, 80 mg, Oral, Daily  sodium chloride, 10 mL, Intravenous, Q12H  tamsulosin, 0.4 mg, Oral, Daily      Continuous Infusions:   PRN Meds:  senna-docusate sodium **AND** polyethylene glycol **AND** bisacodyl **AND** bisacodyl    dextrose    dextrose    glucagon (human recombinant)    Magnesium Standard Dose Replacement - Follow Nurse / BPA Driven Protocol    melatonin    [COMPLETED] Insert Peripheral IV **AND** sodium chloride    sodium chloride    sodium chloride    Assessment & Plan   Assessment and Plan:         Weakness    ASSESSMENT:  Acute kidney injury: Likely related to angiotensin receptor blocker and may be some cardiorenal syndrome specially with the presence of diastolic dysfunctions  Congestive heart failure with grade 1 diastolic dysfunction  History of hypertension  History of diabetes mellitus  Atrial fibrillation              PLAN :   Patient with GI as above, creatinine relatively unchanged today   Patient has significant edema and with some crackles in the lungs, has grade 1 diastolic dysfunctions. S/p 1X dose of Bumex yesterday, continue scheduled Lasix  Hyperkalemia resolved s/p Lokelma   Potassium level is getting better  Sodium level improving appropriately s/p dose of Urea yesterday.   As hemodynamics better will increase diuretics  today  Remaining electrolytes/acid base stable  Manage blood sugar aggressively that also contributing to some hyponatremia   Blood pressure stable on decreased dose of Losartan, continue   Daily labs   We will continue to follow        Electronically signed by   Darrell Perez MD.   [facility] kidney consultant  [phone]  12/4/2024  11:23 EST

## 2024-12-04 NOTE — PLAN OF CARE
Goal Outcome Evaluation:           Problem: Adult Inpatient Plan of Care  Goal: Plan of Care Review  Outcome: Progressing  Goal: Patient-Specific Goal (Individualized)  Outcome: Progressing  Goal: Absence of Hospital-Acquired Illness or Injury  Outcome: Progressing  Intervention: Identify and Manage Fall Risk  Recent Flowsheet Documentation  Taken 12/4/2024 1247 by Jessenia Alfonso RN  Safety Promotion/Fall Prevention:   activity supervised   assistive device/personal items within reach   clutter free environment maintained   fall prevention program maintained   nonskid shoes/slippers when out of bed   room organization consistent   safety round/check completed  Taken 12/4/2024 1020 by Jessenia Alfonso RN  Safety Promotion/Fall Prevention:   activity supervised   assistive device/personal items within reach   clutter free environment maintained   fall prevention program maintained   nonskid shoes/slippers when out of bed   room organization consistent   safety round/check completed  Taken 12/4/2024 0815 by Jessenia Alfonso RN  Safety Promotion/Fall Prevention:   activity supervised   assistive device/personal items within reach   clutter free environment maintained   fall prevention program maintained   nonskid shoes/slippers when out of bed   room organization consistent   safety round/check completed  Intervention: Prevent Skin Injury  Recent Flowsheet Documentation  Taken 12/4/2024 1247 by Jessenia Alfonso RN  Body Position: position changed independently  Taken 12/4/2024 1020 by Jessenia Alfonso RN  Body Position: position changed independently  Taken 12/4/2024 0815 by Jessenia Alfonso RN  Body Position: position changed independently  Skin Protection: incontinence pads utilized  Intervention: Prevent and Manage VTE (Venous Thromboembolism) Risk  Recent Flowsheet Documentation  Taken 12/4/2024 1247 by Jessenia Alfonso RN  VTE Prevention/Management: (eliquis)   other (see comments)   patient refused  intervention  Taken 12/4/2024 0815 by Jessenia Alfonso RN  VTE Prevention/Management: (eliquis)   other (see comments)   SCDs (sequential compression devices) off   patient refused intervention  Intervention: Prevent Infection  Recent Flowsheet Documentation  Taken 12/4/2024 1247 by Jessenia Alfonso RN  Infection Prevention:   hand hygiene promoted   rest/sleep promoted   personal protective equipment utilized   single patient room provided   environmental surveillance performed  Taken 12/4/2024 1020 by Jessenia Alfonso RN  Infection Prevention:   hand hygiene promoted   rest/sleep promoted   personal protective equipment utilized   single patient room provided   environmental surveillance performed  Taken 12/4/2024 0815 by Jessenia Alfonso RN  Infection Prevention:   hand hygiene promoted   rest/sleep promoted   personal protective equipment utilized   single patient room provided   environmental surveillance performed  Goal: Optimal Comfort and Wellbeing  Outcome: Progressing  Intervention: Provide Person-Centered Care  Recent Flowsheet Documentation  Taken 12/4/2024 1247 by Jessenia Alfonso RN  Trust Relationship/Rapport:   choices provided   care explained  Taken 12/4/2024 0815 by Jessenia Alfonso RN  Trust Relationship/Rapport:   choices provided   care explained  Goal: Readiness for Transition of Care  Outcome: Progressing     Problem: Skin Injury Risk Increased  Goal: Skin Health and Integrity  Outcome: Progressing  Intervention: Optimize Skin Protection  Recent Flowsheet Documentation  Taken 12/4/2024 1247 by Jessenia Alfonso RN  Activity Management: activity encouraged  Pressure Reduction Techniques: frequent weight shift encouraged  Head of Bed (HOB) Positioning: HOB at 30-45 degrees  Pressure Reduction Devices: positioning supports utilized  Taken 12/4/2024 1020 by Jessenia Alfonso RN  Activity Management: activity encouraged  Taken 12/4/2024 0815 by Jessenia Alfonso RN  Activity Management: activity  encouraged  Pressure Reduction Techniques: frequent weight shift encouraged  Head of Bed (HOB) Positioning: HOB at 30-45 degrees  Pressure Reduction Devices: positioning supports utilized  Skin Protection: incontinence pads utilized  Intervention: Promote and Optimize Oral Intake  Recent Flowsheet Documentation  Taken 12/4/2024 0815 by Jessenia Alfonso RN  Oral Nutrition Promotion: rest periods promoted     Problem: Fall Injury Risk  Goal: Absence of Fall and Fall-Related Injury  Outcome: Progressing  Intervention: Identify and Manage Contributors  Recent Flowsheet Documentation  Taken 12/4/2024 1247 by Jessenia Alfonso RN  Medication Review/Management: medications reviewed  Self-Care Promotion:   independence encouraged   BADL personal objects within reach  Taken 12/4/2024 1020 by Jessenia Alfonso RN  Medication Review/Management: medications reviewed  Taken 12/4/2024 0815 by Jessenia Alfonso RN  Medication Review/Management: medications reviewed  Self-Care Promotion:   BADL personal objects within reach   independence encouraged  Intervention: Promote Injury-Free Environment  Recent Flowsheet Documentation  Taken 12/4/2024 1247 by Jessenia Alfonso RN  Safety Promotion/Fall Prevention:   activity supervised   assistive device/personal items within reach   clutter free environment maintained   fall prevention program maintained   nonskid shoes/slippers when out of bed   room organization consistent   safety round/check completed  Taken 12/4/2024 1020 by Jessenia Alfonso RN  Safety Promotion/Fall Prevention:   activity supervised   assistive device/personal items within reach   clutter free environment maintained   fall prevention program maintained   nonskid shoes/slippers when out of bed   room organization consistent   safety round/check completed  Taken 12/4/2024 0815 by Jessenia Alfonso RN  Safety Promotion/Fall Prevention:   activity supervised   assistive device/personal items within reach   clutter free  environment maintained   fall prevention program maintained   nonskid shoes/slippers when out of bed   room organization consistent   safety round/check completed     Problem: Confusion Acute  Goal: Optimal Cognitive Function  Outcome: Progressing  Intervention: Minimize Contributing Factors  Recent Flowsheet Documentation  Taken 12/4/2024 1247 by Jessenia Alfonso, RN  Reorientation Measures:   clock in view   hearing device use encouraged  Communication Support Strategies: one-step directions provided  Taken 12/4/2024 0815 by Jessenia Alfonso, RN  Reorientation Measures:   clock in view   hearing device use encouraged  Communication Support Strategies: one-step directions provided

## 2024-12-04 NOTE — CASE MANAGEMENT/SOCIAL WORK
Continued Stay Note  [facility]     Patient Name: Ap Roe  MRN: [id number]  Today's Date: 12/4/2024    Admit Date: 11/26/2024    Plan: [facility] accepted pending precert appeal. Precert denied, appeal started 12/4 (can take up to 72 hrs for determination). Vs. SNF choices pending. PAS[facility] approved. From home with spouse.   Discharge Plan       Row Name 12/04/24 1607       Plan    Plan [facility] accepted pending precert appeal. Precert denied, appeal started 12/4 (can take up to 72 hrs for determination). Vs. SNF choices pending. PAS[facility] approved. From home with spouse.    Plan Comments CM contacted [facility] to confirm that appeal was started and can take up to 72 hours for determination. Anel confirmed that it was sent to insurance today, 12/4 at 2pm. Discussed pending appeal with MD, NP, and RN. MD reported that patient’s family inquired about bypassing insurance/precert process if possible. Per [facility], unable to admit patients without a payor source but private pay options could be discussed if needed.                  Renetta Jon RN     Office Phone: [phone]  Office Cell: [phone]

## 2024-12-04 NOTE — PLAN OF CARE
Goal Outcome Evaluation:  Plan of Care Reviewed With: patient, spouse        Progress: improving  Outcome Evaluation: Labs improving (Na increased to 128, K down to 4.8). Patient confused overnight, Wife at bedside, Possible discharge soon pending placement.

## 2024-12-04 NOTE — PLAN OF CARE
Goal Outcome Evaluation:                   Problem: Adult Inpatient Plan of Care  Goal: Plan of Care Review  12/4/2024 1719 by Jessenia Alfonso RN  Outcome: Progressing  12/4/2024 1412 by Jessenia Alfonso RN  Outcome: Progressing  Goal: Patient-Specific Goal (Individualized)  12/4/2024 1719 by Jessenia Alfonso RN  Outcome: Progressing  12/4/2024 1412 by Jessenia Alfonso RN  Outcome: Progressing  Goal: Absence of Hospital-Acquired Illness or Injury  12/4/2024 1719 by Jessenia Alfonso RN  Outcome: Progressing  12/4/2024 1412 by Jessenia Alfonso RN  Outcome: Progressing  Intervention: Identify and Manage Fall Risk  Recent Flowsheet Documentation  Taken 12/4/2024 1616 by Jessenia Alfonso RN  Safety Promotion/Fall Prevention:   activity supervised   assistive device/personal items within reach   clutter free environment maintained   fall prevention program maintained   nonskid shoes/slippers when out of bed   room organization consistent   safety round/check completed  Taken 12/4/2024 1430 by Jessenia Alfonso RN  Safety Promotion/Fall Prevention:   activity supervised   assistive device/personal items within reach   clutter free environment maintained   fall prevention program maintained   nonskid shoes/slippers when out of bed   room organization consistent   safety round/check completed  Taken 12/4/2024 1247 by Jessenia Alfonso RN  Safety Promotion/Fall Prevention:   activity supervised   assistive device/personal items within reach   clutter free environment maintained   fall prevention program maintained   nonskid shoes/slippers when out of bed   room organization consistent   safety round/check completed  Taken 12/4/2024 1020 by Jessenia Alfonso RN  Safety Promotion/Fall Prevention:   activity supervised   assistive device/personal items within reach   clutter free environment maintained   fall prevention program maintained   nonskid shoes/slippers when out of bed   room organization consistent   safety round/check  completed  Taken 12/4/2024 0815 by Jessenia Alfonso RN  Safety Promotion/Fall Prevention:   activity supervised   assistive device/personal items within reach   clutter free environment maintained   fall prevention program maintained   nonskid shoes/slippers when out of bed   room organization consistent   safety round/check completed  Intervention: Prevent Skin Injury  Recent Flowsheet Documentation  Taken 12/4/2024 1616 by Jessenia Alfonso RN  Body Position: position changed independently  Taken 12/4/2024 1247 by Jessenia Alfonso RN  Body Position: position changed independently  Taken 12/4/2024 1020 by Jessenia Alfonso RN  Body Position: position changed independently  Taken 12/4/2024 0815 by Jessenia Alfonso RN  Body Position: position changed independently  Skin Protection: incontinence pads utilized  Intervention: Prevent and Manage VTE (Venous Thromboembolism) Risk  Recent Flowsheet Documentation  Taken 12/4/2024 1616 by Jessenia Alfonso RN  VTE Prevention/Management: (eliquis) other (see comments)  Taken 12/4/2024 1247 by Jessenia Alfonso RN  VTE Prevention/Management: (eliquis)   other (see comments)   patient refused intervention  Taken 12/4/2024 0815 by Jessenia Alfnoso RN  VTE Prevention/Management: (eliquis)   other (see comments)   SCDs (sequential compression devices) off   patient refused intervention  Intervention: Prevent Infection  Recent Flowsheet Documentation  Taken 12/4/2024 1616 by Jessenia Alfonso RN  Infection Prevention:   hand hygiene promoted   personal protective equipment utilized   rest/sleep promoted   single patient room provided   environmental surveillance performed  Taken 12/4/2024 1430 by Jessenia Alfonso RN  Infection Prevention:   hand hygiene promoted   personal protective equipment utilized   rest/sleep promoted   single patient room provided   environmental surveillance performed  Taken 12/4/2024 1247 by Jessenia Alfonso RN  Infection Prevention:   hand hygiene promoted    rest/sleep promoted   personal protective equipment utilized   single patient room provided   environmental surveillance performed  Taken 12/4/2024 1020 by Jessenia Alfonso RN  Infection Prevention:   hand hygiene promoted   rest/sleep promoted   personal protective equipment utilized   single patient room provided   environmental surveillance performed  Taken 12/4/2024 0815 by Jessenia Alfonso RN  Infection Prevention:   hand hygiene promoted   rest/sleep promoted   personal protective equipment utilized   single patient room provided   environmental surveillance performed  Goal: Optimal Comfort and Wellbeing  12/4/2024 1719 by Jessenia Alfonso RN  Outcome: Progressing  12/4/2024 1412 by Jessenia Alfonso RN  Outcome: Progressing  Intervention: Provide Person-Centered Care  Recent Flowsheet Documentation  Taken 12/4/2024 1616 by Jessenia Alfonso RN  Trust Relationship/Rapport:   care explained   choices provided  Taken 12/4/2024 1247 by Jessenia Alfonso RN  Trust Relationship/Rapport:   choices provided   care explained  Taken 12/4/2024 0815 by Jessenia Alfonso RN  Trust Relationship/Rapport:   choices provided   care explained  Goal: Readiness for Transition of Care  12/4/2024 1719 by Jessenia Alfonso RN  Outcome: Progressing  12/4/2024 1412 by Jessenia Alfonso RN  Outcome: Progressing     Problem: Skin Injury Risk Increased  Goal: Skin Health and Integrity  12/4/2024 1719 by Jessenia Alfonso RN  Outcome: Progressing  12/4/2024 1412 by Jessenia Alfonso RN  Outcome: Progressing  Intervention: Optimize Skin Protection  Recent Flowsheet Documentation  Taken 12/4/2024 1616 by Jessenia Alfonso RN  Activity Management: activity encouraged  Head of Bed (HOB) Positioning: HOB at 30-45 degrees  Taken 12/4/2024 1247 by Jessenia Alfonso RN  Activity Management: activity encouraged  Pressure Reduction Techniques: frequent weight shift encouraged  Head of Bed (HOB) Positioning: HOB at 30-45 degrees  Pressure Reduction Devices:  positioning supports utilized  Taken 12/4/2024 1020 by Jessenia Alfonso RN  Activity Management: activity encouraged  Taken 12/4/2024 0815 by Jessenia Alfonso RN  Activity Management: activity encouraged  Pressure Reduction Techniques: frequent weight shift encouraged  Head of Bed (HOB) Positioning: HOB at 30-45 degrees  Pressure Reduction Devices: positioning supports utilized  Skin Protection: incontinence pads utilized  Intervention: Promote and Optimize Oral Intake  Recent Flowsheet Documentation  Taken 12/4/2024 1616 by Jessenia Alfonso RN  Oral Nutrition Promotion: rest periods promoted  Taken 12/4/2024 0815 by Jessenia Alfonso RN  Oral Nutrition Promotion: rest periods promoted

## 2024-12-04 NOTE — PLAN OF CARE
Goal Outcome Evaluation:      Assessment: Ap Roe presents with functional mobility impairments which indicate the need for skilled intervention. Patient demonstrates steady progress with mobility with use of RW continues to require cues for RW management and safety as device use is novel to him.  He requires up to [place] for mobility and balance.  He has excellent family support, motivation, and rehab potential such that the IRF level of care would be appropriate.  He demonstrates elevated falls risk and reduced independence with functional mobility from his baseline.  If IRF does not accept second choice would be SNF level of care but patient will not progress as quickly to his home environment with a lesser intensity of therapy.   Tolerating session today without incident. Will continue to follow and progress as tolerated.             Anticipated Discharge Disposition (PT): inpatient rehabilitation facility

## 2024-12-04 NOTE — THERAPY TREATMENT NOTE
Subjective: Pt agreeable to therapeutic plan of care.  Patient cleared for therapy by nursing.  Patient up in chair and agreeable A&Ox4    Objective:     Precautions - falls    Bed mobility - NA  Transfers - CGA cues for hand placement  Ambulation - 45x2 feet CGA, Min-A, and with rolling walker, cues for posture, RW management, and pacing    Sit<>stand x5 reps for strengthening and hand placement with CGA for balance    Static and dynamic standing reaching withn SHELIA with CGA with RW 2x1-2 minutes    Therapeutic Exercise - standing:MIP, heel raises, toe raises x10 reps with BUE support and CGA; seated: AP, LAQ, MIP, hip ab/add x10-15 reps with instruction for completion of seated acts when inactive and alone.       Vitals: WNL  Sittin/47mmHG, 60bpm, 97% RA  Standin/60, 86bpm, 94% RA    Pain: 0 VAS       Education: Provided education on the importance of mobility in the acute care setting, Transfer Training, and Gait Training.  Increased time discussing rehab process and dc dispo with patient and family.    Assessment: Ap Roe presents with functional mobility impairments which indicate the need for skilled intervention. Patient demonstrates steady progress with mobility with use of RW continues to require cues for RW management and safety as device use is novel to him.  He requires up to [place] for mobility and balance.  He has excellent family support, motivation, and rehab potential such that the IRF level of care would be appropriate.  He demonstrates elevated falls risk and reduced independence with functional mobility from his baseline.  If IRF does not accept second choice would be SNF level of care but patient will not progress as quickly to his home environment with a lesser intensity of therapy.   Tolerating session today without incident. Will continue to follow and progress as tolerated.     Plan/Recommendations:   If medically appropriate, High Intensity Therapy recommended post-acute  care. This is recommended as therapy feels the patient would require 5-6 days per week, 2-3 hours per day. At this time, inpatient rehabilitation (acute rehab) would be the first choice and SNF would be second. Pt requires no DME at discharge.     Pt desires Inpatient Rehabilitation placement at discharge. Pt cooperative; agreeable to therapeutic recommendations and plan of care.         Basic Mobility 6-click:  Rollin = Total, A lot = 2, A little = 3; 4 = None  Supine>Sit:   1 = Total, A lot = 2, A little = 3; 4 = None   Sit>Stand with arms:  1 = Total, A lot = 2, A little = 3; 4 = None  Bed>Chair:   1 = Total, A lot = 2, A little = 3; 4 = None  Ambulate in room:  1 = Total, A lot = 2, A little = 3; 4 = None  3-5 Steps with railin = Total, A lot = 2, A little = 3; 4 = None  Score: 17    Modified [place]: N/A = No pre-op stroke/TIA    Post-Tx Position: Up in Chair, Alarms activated, and Call light and personal items within reach  PPE: gloves

## 2024-12-05 LAB
ALBUMIN SERPL-MCNC: 2.6 G/DL (ref 3.5–5.2)
ALBUMIN/GLOB SERPL: 1.2 G/DL
ALP SERPL-CCNC: 71 U/L (ref 39–117)
ALT SERPL W P-5'-P-CCNC: 13 U/L (ref 1–41)
ANION GAP SERPL CALCULATED.3IONS-SCNC: 7.9 MMOL/L (ref 5–15)
AST SERPL-CCNC: 35 U/L (ref 1–40)
BASOPHILS # BLD AUTO: 0.02 10*3/MM3 (ref 0–0.2)
BASOPHILS NFR BLD AUTO: 0.3 % (ref 0–1.5)
BILIRUB SERPL-MCNC: 0.5 MG/DL (ref 0–1.2)
BUN SERPL-MCNC: 27 MG/DL (ref 8–23)
BUN/CREAT SERPL: 24.8 (ref 7–25)
CALCIUM SPEC-SCNC: 7.2 MG/DL (ref 8.2–9.6)
CHLORIDE SERPL-SCNC: 105 MMOL/L (ref 98–107)
CO2 SERPL-SCNC: 19.1 MMOL/L (ref 22–29)
CREAT SERPL-MCNC: 1.09 MG/DL (ref 0.76–1.27)
DEPRECATED RDW RBC AUTO: 47.5 FL (ref 37–54)
EGFRCR SERPLBLD CKD-EPI 2021: 62.1 ML/MIN/1.73
EOSINOPHIL # BLD AUTO: 0.1 10*3/MM3 (ref 0–0.4)
EOSINOPHIL NFR BLD AUTO: 1.7 % (ref 0.3–6.2)
ERYTHROCYTE [DISTWIDTH] IN BLOOD BY AUTOMATED COUNT: 14.2 % (ref 12.3–15.4)
GLOBULIN UR ELPH-MCNC: 2.2 GM/DL
GLUCOSE BLDC GLUCOMTR-MCNC: 151 MG/DL (ref 70–105)
GLUCOSE BLDC GLUCOMTR-MCNC: 163 MG/DL (ref 70–105)
GLUCOSE BLDC GLUCOMTR-MCNC: 200 MG/DL (ref 70–105)
GLUCOSE BLDC GLUCOMTR-MCNC: 244 MG/DL (ref 70–105)
GLUCOSE SERPL-MCNC: 181 MG/DL (ref 65–99)
HCT VFR BLD AUTO: 30.9 % (ref 37.5–51)
HGB BLD-MCNC: 10.6 G/DL (ref 13–17.7)
IMM GRANULOCYTES # BLD AUTO: 0.02 10*3/MM3 (ref 0–0.05)
IMM GRANULOCYTES NFR BLD AUTO: 0.3 % (ref 0–0.5)
LYMPHOCYTES # BLD AUTO: 1.52 10*3/MM3 (ref 0.7–3.1)
LYMPHOCYTES NFR BLD AUTO: 25.2 % (ref 19.6–45.3)
MAGNESIUM SERPL-MCNC: 1.3 MG/DL (ref 1.7–2.3)
MCH RBC QN AUTO: 31.6 PG (ref 26.6–33)
MCHC RBC AUTO-ENTMCNC: 34.3 G/DL (ref 31.5–35.7)
MCV RBC AUTO: 92.2 FL (ref 79–97)
MONOCYTES # BLD AUTO: 0.9 10*3/MM3 (ref 0.1–0.9)
MONOCYTES NFR BLD AUTO: 14.9 % (ref 5–12)
NEUTROPHILS NFR BLD AUTO: 3.47 10*3/MM3 (ref 1.7–7)
NEUTROPHILS NFR BLD AUTO: 57.6 % (ref 42.7–76)
NRBC BLD AUTO-RTO: 0 /100 WBC (ref 0–0.2)
PLATELET # BLD AUTO: 188 10*3/MM3 (ref 140–450)
PMV BLD AUTO: 8 FL (ref 6–12)
POTASSIUM SERPL-SCNC: 4 MMOL/L (ref 3.5–5.2)
PROT SERPL-MCNC: 4.8 G/DL (ref 6–8.5)
RBC # BLD AUTO: 3.35 10*6/MM3 (ref 4.14–5.8)
SODIUM SERPL-SCNC: 132 MMOL/L (ref 136–145)
WBC NRBC COR # BLD AUTO: 6.03 10*3/MM3 (ref 3.4–10.8)

## 2024-12-05 PROCEDURE — 83735 ASSAY OF MAGNESIUM: CPT

## 2024-12-05 PROCEDURE — 82948 REAGENT STRIP/BLOOD GLUCOSE: CPT | Performed by: PHYSICIAN ASSISTANT

## 2024-12-05 PROCEDURE — 80053 COMPREHEN METABOLIC PANEL: CPT

## 2024-12-05 PROCEDURE — 25010000002 ALBUMIN HUMAN 25% PER 50 ML: Performed by: INTERNAL MEDICINE

## 2024-12-05 PROCEDURE — 63710000001 INSULIN LISPRO (HUMAN) PER 5 UNITS: Performed by: PHYSICIAN ASSISTANT

## 2024-12-05 PROCEDURE — 25010000002 MAGNESIUM SULFATE 2 GM/50ML SOLUTION: Performed by: FAMILY MEDICINE

## 2024-12-05 PROCEDURE — 85025 COMPLETE CBC W/AUTO DIFF WBC: CPT

## 2024-12-05 PROCEDURE — P9047 ALBUMIN (HUMAN), 25%, 50ML: HCPCS | Performed by: INTERNAL MEDICINE

## 2024-12-05 PROCEDURE — 82948 REAGENT STRIP/BLOOD GLUCOSE: CPT

## 2024-12-05 RX ORDER — FUROSEMIDE 20 MG/1
20 TABLET ORAL DAILY
Status: DISCONTINUED | OUTPATIENT
Start: 2024-12-06 | End: 2024-12-08

## 2024-12-05 RX ORDER — MAGNESIUM SULFATE HEPTAHYDRATE 40 MG/ML
2 INJECTION, SOLUTION INTRAVENOUS
Status: COMPLETED | OUTPATIENT
Start: 2024-12-05 | End: 2024-12-05

## 2024-12-05 RX ORDER — ALBUMIN (HUMAN) 12.5 G/50ML
25 SOLUTION INTRAVENOUS ONCE
Status: COMPLETED | OUTPATIENT
Start: 2024-12-05 | End: 2024-12-05

## 2024-12-05 RX ORDER — LOSARTAN POTASSIUM 25 MG/1
25 TABLET ORAL DAILY
Status: DISCONTINUED | OUTPATIENT
Start: 2024-12-06 | End: 2024-12-08

## 2024-12-05 RX ADMIN — INSULIN LISPRO 3 UNITS: 100 INJECTION, SOLUTION INTRAVENOUS; SUBCUTANEOUS at 21:45

## 2024-12-05 RX ADMIN — MAGNESIUM SULFATE HEPTAHYDRATE 2 G: 40 INJECTION, SOLUTION INTRAVENOUS at 15:03

## 2024-12-05 RX ADMIN — INSULIN LISPRO 2 UNITS: 100 INJECTION, SOLUTION INTRAVENOUS; SUBCUTANEOUS at 18:02

## 2024-12-05 RX ADMIN — MAGNESIUM SULFATE HEPTAHYDRATE 2 G: 40 INJECTION, SOLUTION INTRAVENOUS at 12:53

## 2024-12-05 RX ADMIN — APIXABAN 5 MG: 5 TABLET, FILM COATED ORAL at 08:56

## 2024-12-05 RX ADMIN — TAMSULOSIN HYDROCHLORIDE 0.4 MG: 0.4 CAPSULE ORAL at 08:56

## 2024-12-05 RX ADMIN — INSULIN LISPRO 2 UNITS: 100 INJECTION, SOLUTION INTRAVENOUS; SUBCUTANEOUS at 08:55

## 2024-12-05 RX ADMIN — APIXABAN 5 MG: 5 TABLET, FILM COATED ORAL at 21:45

## 2024-12-05 RX ADMIN — PIOGLITAZONE 30 MG: 30 TABLET ORAL at 08:56

## 2024-12-05 RX ADMIN — AMIODARONE HYDROCHLORIDE 200 MG: 200 TABLET ORAL at 08:57

## 2024-12-05 RX ADMIN — ATORVASTATIN CALCIUM 10 MG: 10 TABLET ORAL at 08:56

## 2024-12-05 RX ADMIN — MAGNESIUM SULFATE HEPTAHYDRATE 2 G: 40 INJECTION, SOLUTION INTRAVENOUS at 17:11

## 2024-12-05 RX ADMIN — ALBUMIN (HUMAN) 25 G: 0.25 INJECTION, SOLUTION INTRAVENOUS at 11:08

## 2024-12-05 RX ADMIN — INSULIN LISPRO 3 UNITS: 100 INJECTION, SOLUTION INTRAVENOUS; SUBCUTANEOUS at 11:35

## 2024-12-05 NOTE — SIGNIFICANT NOTE
12/05/24 1601   OTHER   Discipline physical therapist   Rehab Time/Intention   Session Not Performed patient unavailable for treatment  (Patient sleeping at the time of the attempt and toileting on first attempt.  Will check back as able)   Therapy Assessment/Plan (PT)   Criteria for Skilled Interventions Met (PT) yes;meets criteria   Recommendation   PT - Next Appointment 12/06/24

## 2024-12-05 NOTE — DISCHARGE PLACEMENT REQUEST
"Luh Roe (89 y.o. Male)       Date of Birth   01/15/1928    Social Security Number       Address   [address] [place] [address]    Home Phone   [phone]    MRN   [id number]       [place]   [employer]    Marital Status                               Admission Date   24    Admission Type   Emergency    Admitting Provider   Shaji Loredo MD    Attending Provider   Twyla Johnson MD    Department, Room/Bed   [facility],        Discharge Date       Discharge Disposition       Discharge Destination                                 Attending Provider: Twyla Johnson MD    Allergies: Celebrex [Celecoxib]    Isolation: None   Infection: None   Code Status: No CPR    Ht: 172.7 cm (68\")   Wt: 82.5 kg (181 lb 14.1 oz)    Admission Cmt: None   Principal Problem: Weakness [R53.1]                   Active Insurance as of 2024       Primary Coverage       Payor Plan Insurance Group Employer/Plan Group    HUMANA MEDICARE REPLACEMENT HUMANA MED ADV GROUP [id number]       Payor Plan Address Payor Plan Phone Number Payor Plan Fax Number Effective Dates    [address] [address] [phone]  2018 - None Entered    [facility] [id number]         Subscriber Name Subscriber Birth Date Member ID       LUH ROE 1/15/1928 [id number]                     Emergency Contacts        (Rel.) Home Phone Work Phone Mobile Phone    MICHAEL MENDOZA (Daughter) -- -- [phone]    RILEY ROE (Spouse) [phone] -- --    RosannaJoyce (Daughter) [phone] -- --                 History & Physical        Hyacinth Preciado APRN at 24 1708       Attestation signed by Shaji Loredo MD at 24 0703    I have reviewed this documentation and agree.                      [facility]  History & Physical    Patient Name: uLh Roe  : 1/15/1928  MRN: [id number]  Primary Care Physician:  Elva Mota MD  Date of admission: " 11/26/2024  Date and Time of Service: 11/26/2024 at 1709    Subjective      Chief Complaint: fall    History of Present Illness: Ap Roe is a 89 y.o. male with a CMH of diabetes, coronary artery disease who presented to [facility] on 11/26/2024 with a fall that occurred at home.  He said he was walking up the steps in his garage and fell landing on his knees.  He is also complaining of left hip pain. He did not hit his head.  On ED evaluation no fractures were identified. Patient was originally admitted under observation status.  PT and OT have evaluated this patient and they are recommending inpatient rehab. Hospitalist team to admit for further medical management.      Review of Systems   Constitutional:  Positive for fatigue.   Musculoskeletal:  Positive for arthralgias and gait problem.   Neurological:  Positive for weakness.       Personal History     Past Medical History:   Diagnosis Date    Arthritis     Diabetes 1.5, managed as type 2     Heart disease        Past Surgical History:   Procedure Laterality Date    CHOLECYSTECTOMY      HAND SURGERY      MELANOMA WIDE LOCAL EXCISION Right     cheek    PACEMAKER IMPLANTATION      SKIN GRAFT      scalp       Family History: family history is not on file. Otherwise pertinent FHx was reviewed and not pertinent to current issue.    Social History:  reports that he has never smoked. He has never used smokeless tobacco. He reports that he does not drink alcohol and does not use drugs.    Home Medications:  Prior to Admission Medications       Prescriptions Last Dose Informant Patient Reported? Taking?    amiodarone (PACERONE) 200 MG tablet 11/26/2024  Yes Yes    Take 1 tablet by mouth Daily.    Eliquis 5 MG tablet tablet 11/26/2024  Yes Yes    Take 1 tablet by mouth Every 12 (Twelve) Hours.    furosemide (LASIX) 20 MG tablet 11/26/2024  Yes Yes    Take 1 tablet by mouth Daily.    losartan (COZAAR) 100 MG tablet 11/26/2024  Yes Yes    Take 1 tablet by  mouth Daily.    metFORMIN (GLUCOPHAGE) 1000 MG tablet 11/26/2024  Yes Yes    Take 1 tablet by mouth 2 (Two) Times a Day With Meals.    pioglitazone (ACTOS) 30 MG tablet 11/26/2024  Yes Yes    Take 1 tablet by mouth Daily.    propranolol (INDERAL) 80 MG tablet 11/26/2024  Yes Yes    Take 1 tablet by mouth Daily.    simvastatin (ZOCOR) 20 MG tablet 11/26/2024  Yes Yes    Take 1 tablet by mouth Daily.    tamsulosin (FLOMAX) 0.4 MG capsule 24 hr capsule 11/26/2024  Yes Yes    Take 1 capsule by mouth Daily.              Allergies:  Allergies   Allergen Reactions    Celebrex [Celecoxib] Hives       Objective      Vitals:   Temp:  [97.5 °F (36.4 °C)-97.8 °F (36.6 °C)] 97.8 °F (36.6 °C)  Heart Rate:  [60-61] 60  Resp:  [17-18] 17  BP: (120-166)/(65-99) 166/81  Body mass index is 27.65 kg/m².  Physical Exam  Vitals and nursing note reviewed.   Constitutional:       Appearance: Normal appearance.   HENT:      Head: Normocephalic and atraumatic.      Nose: Nose normal.      Mouth/Throat:      Mouth: Mucous membranes are moist.   Eyes:      Extraocular Movements: Extraocular movements intact.      Pupils: Pupils are equal, round, and reactive to light.   Cardiovascular:      Rate and Rhythm: Normal rate and regular rhythm.      Pulses: Normal pulses.   Pulmonary:      Effort: Pulmonary effort is normal.      Breath sounds: Normal breath sounds.   Abdominal:      General: Bowel sounds are normal.      Palpations: Abdomen is soft.   Musculoskeletal:         General: Normal range of motion.      Cervical back: Normal range of motion and neck supple.      Right lower leg: Edema present.      Left lower leg: Edema present.   Skin:     General: Skin is warm.      Capillary Refill: Capillary refill takes less than 2 seconds.      Findings: Lesion (left knee) present.   Neurological:      Mental Status: He is alert and oriented to person, place, and time.         Diagnostic Data:  Lab Results (last 24 hours)       Procedure Component  Value Units Date/Time    POC Glucose Once [[id number]]  (Abnormal) Collected: 11/27/24 1627    Specimen: Blood Updated: 11/27/24 1628     Glucose 169 mg/dL      Comment: Serial Number: [id number]:  [id number]       POC Glucose 4x Daily Before Meals & at Bedtime [[id number]]  (Abnormal) Collected: 11/27/24 1142    Specimen: Blood Updated: 11/27/24 1144     Glucose 211 mg/dL      Comment: Serial Number: [id number]:  [id number]       POC Glucose 4x Daily Before Meals & at Bedtime [[id number]]  (Abnormal) Collected: 11/27/24 0733    Specimen: Blood Updated: 11/27/24 0735     Glucose 187 mg/dL      Comment: Serial Number: [id number]:  [id number]       Comprehensive Metabolic Panel [[id number]]  (Abnormal) Collected: 11/27/24 0336    Specimen: Blood from Arm, Right Updated: 11/27/24 0417     Glucose 173 mg/dL      BUN 21 mg/dL      Creatinine 1.26 mg/dL      Sodium 130 mmol/L      Potassium 3.6 mmol/L      Chloride 92 mmol/L      CO2 26.6 mmol/L      Calcium 9.5 mg/dL      Total Protein 6.5 g/dL      Albumin 3.7 g/dL      ALT (SGPT) 16 U/L      AST (SGOT) 29 U/L      Alkaline Phosphatase 85 U/L      Total Bilirubin 0.8 mg/dL      Globulin 2.8 gm/dL      A/G Ratio 1.3 g/dL      BUN/Creatinine Ratio 16.7     Anion Gap 11.4 mmol/L      eGFR 52.2 mL/min/1.73     Narrative:      GFR Normal >60  Chronic Kidney Disease <60  Kidney Failure <15    The GFR formula is only valid for adults with stable renal function between ages 18 and 70.    CBC & Differential [[id number]]  (Abnormal) Collected: 11/27/24 0336    Specimen: Blood from Arm, Right Updated: 11/27/24 0356    Narrative:      The following orders were created for panel order CBC & Differential.  Procedure                               Abnormality         Status                     ---------                               -----------         ------                     CBC Auto Differential[[id number]]        Abnormal            Final result                 Please  view results for these tests on the individual orders.    CBC Auto Differential [[id number]]  (Abnormal) Collected: 11/27/24 0336    Specimen: Blood from Arm, Right Updated: 11/27/24 0356     WBC 5.94 10*3/mm3      RBC 3.57 10*6/mm3      Hemoglobin 10.8 g/dL      Hematocrit 32.6 %      MCV 91.3 fL      MCH 30.3 pg      MCHC 33.1 g/dL      RDW 14.0 %      RDW-SD 46.6 fl      MPV 7.9 fL      Platelets 183 10*3/mm3      Neutrophil % 58.0 %      Lymphocyte % 24.2 %      Monocyte % 16.5 %      Eosinophil % 0.5 %      Basophil % 0.3 %      Immature Grans % 0.5 %      Neutrophils, Absolute 3.44 10*3/mm3      Lymphocytes, Absolute 1.44 10*3/mm3      Monocytes, Absolute 0.98 10*3/mm3      Eosinophils, Absolute 0.03 10*3/mm3      Basophils, Absolute 0.02 10*3/mm3      Immature Grans, Absolute 0.03 10*3/mm3      nRBC 0.0 /100 WBC     Comprehensive Metabolic Panel [[id number]]  (Abnormal) Collected: 11/26/24 1732    Specimen: Blood Updated: 11/26/24 1824     Glucose 148 mg/dL      BUN 23 mg/dL      Creatinine 1.27 mg/dL      Sodium 132 mmol/L      Potassium 4.5 mmol/L      Chloride 93 mmol/L      CO2 24.7 mmol/L      Calcium 9.6 mg/dL      Total Protein 6.9 g/dL      Albumin 4.0 g/dL      ALT (SGPT) 16 U/L      AST (SGOT) 29 U/L      Alkaline Phosphatase 101 U/L      Total Bilirubin 0.8 mg/dL      Globulin 2.9 gm/dL      A/G Ratio 1.4 g/dL      BUN/Creatinine Ratio 18.1     Anion Gap 14.3 mmol/L      eGFR 51.7 mL/min/1.73     Narrative:      GFR Normal >60  Chronic Kidney Disease <60  Kidney Failure <15    The GFR formula is only valid for adults with stable renal function between ages 18 and 70.    Magnesium [[id number]]  (Abnormal) Collected: 11/26/24 1732    Specimen: Blood Updated: 11/26/24 1824     Magnesium 1.3 mg/dL     CBC & Differential [[id number]]  (Abnormal) Collected: 11/26/24 1732    Specimen: Blood Updated: 11/26/24 1744    Narrative:      The following orders were created for panel order CBC &  Differential.  Procedure                               Abnormality         Status                     ---------                               -----------         ------                     CBC Auto Differential[[id number]]        Abnormal            Final result                 Please view results for these tests on the individual orders.    CBC Auto Differential [[id number]]  (Abnormal) Collected: 11/26/24 1732    Specimen: Blood Updated: 11/26/24 1743     WBC 6.39 10*3/mm3      RBC 3.65 10*6/mm3      Hemoglobin 11.3 g/dL      Hematocrit 33.8 %      MCV 92.6 fL      MCH 31.0 pg      MCHC 33.4 g/dL      RDW 14.2 %      RDW-SD 47.9 fl      MPV 7.7 fL      Platelets 190 10*3/mm3      Neutrophil % 68.2 %      Lymphocyte % 20.3 %      Monocyte % 10.6 %      Eosinophil % 0.2 %      Basophil % 0.2 %      Immature Grans % 0.5 %      Neutrophils, Absolute 4.36 10*3/mm3      Lymphocytes, Absolute 1.30 10*3/mm3      Monocytes, Absolute 0.68 10*3/mm3      Eosinophils, Absolute 0.01 10*3/mm3      Basophils, Absolute 0.01 10*3/mm3      Immature Grans, Absolute 0.03 10*3/mm3      nRBC 0.0 /100 WBC     Urinalysis With Microscopic If Indicated (No Culture) - Urine, Clean Catch [[id number]]  (Normal) Collected: 11/26/24 1726    Specimen: Urine, Clean Catch Updated: 11/26/24 1736     Color, UA Yellow     Appearance, UA Clear     pH, UA 5.5     Specific Gravity, UA 1.007     Glucose, UA Negative     Ketones, UA Negative     Bilirubin, UA Negative     Blood, UA Negative     Protein, UA Negative     Leuk Esterase, UA Negative     Nitrite, UA Negative     Urobilinogen, UA 1.0 E.U./dL    Narrative:      Urine microscopic not indicated.             Imaging Results (Last 24 Hours)       Procedure Component Value Units Date/Time    XR Hip With or Without Pelvis 2 - 3 View Left [[id number]] Collected: 11/26/24 1748     Updated: 11/26/24 1752    Narrative:      XR HIP W OR WO PELVIS 2-3 VIEW LEFT    Date of Exam: 11/26/2024 5:09 PM  EST    Indication: hip pain after fall    Comparison: None available.    Findings:  The proximal left femur is intact. The iliopectineal and ilioischial lines are intact bilaterally. Mild bilateral hip osteoarthritis. Multilevel disc narrowing in the visualized lower lumbar spine. Vascular calcifications noted.      Impression:      Impression:  Negative for acute osseous abnormality.        Electronically Signed: Damian Keen MD    11/26/2024 5:50 PM EST    Workstation ID: [id number]              Assessment & Plan        This is a 89 y.o. male with:    Active and Resolved Problems  Active Hospital Problems    Diagnosis  POA    **Weakness [R53.1]  Yes      Resolved Hospital Problems   No resolved problems to display.       Fall   Weakness  - X-ray of knee showed degenerative changes with no fractures and soft tissues swelling on the left  - X-ray of hip showed no acute abnormality  - fall precautions  - PT/OT consulted recommending inpatient rehab  - Pre-cert for [facility] rehab pending     Hypomagnesemia  - Magnesium: 1.3 on admit   - Mag sulfate given in the ED  - Repeat lab pending     Hyponatremia   - appears chronic   - Sodium: 132, 130 at baseline   -Monitor while admitted     Diabetes mellitus  -   - Hold metformin  - Continue Actos  - SSI ordered  - Diabetic diet     Anemia   - Hgb 10.8  - No overt s/sx of bleeding   - Continue to monitor CBC  - Transfuse if Hgb < 7    Atrial fibrillation  -Continue amiodarone, propranolol and Eliquis     Hypertension  - /81  - continue losartan    VTE Prophylaxis:  Pharmacologic & mechanical VTE prophylaxis orders are present.        The patient desires to be as follows:    CODE STATUS:    Code Status (Patient has no pulse and is not breathing): CPR (Attempt to Resuscitate)  Medical Interventions (Patient has pulse or is breathing): Full Support        Giuliana Roe, who can be contacted at [phone], is the designated person to make medical decisions on the patient's  "behalf if He is incapable of doing so. This was clarified with patient and/or next of kin on 2024 during the course of this H&P.    Admission Status:  I believe this patient meets inpatient status.    Expected Length of Stay: 1-2 days    PDMP and Medication Dispenses via Sidebar reviewed and consistent with patient reported medications.    I discussed the patient's findings and my recommendations with patient.      Signature:     This document has been electronically signed by DOMONIQUE Hernandez on 2024 17:09 EST   [facility] Hospitalist Team      Electronically signed by Shaji Loredo MD at 24 0703       Jayjay Ross PA-C at 24 1531       Attestation signed by Chuy Rick MD at 24 1226    PA documentation reviewed                [facility] H&P    Patient Name: Ap Roe  : 1/15/1928  MRN: [id number]  Primary Care Physician: Elva Mota MD  Date of admission: 2024     Patient Care Team:  Elva Mota MD as PCP - General (Internal Medicine)          Subjective  History Present Illness     Chief Complaint:   Chief Complaint   Patient presents with    Fall         History of Present Illness  Obtained from ED provider HPI on 2024:  Patient is a 89-year-old male presented via EMS from home after mechanical fall patient states he tripped going up the steps in his garage landing on his knees.  He has some abrasions noted on his knees bilaterally does report some pain in this region.  He denies pain elsewhere.  He denies any head injury or loss of consciousness.  He denies any lightheadedness or dizziness to cause the fall.  Denies any numbness or weakness of his legs.     24:  Patient and family with him confirms the HPI noted above reporting a fall the previous day without head trauma or loss of consciousness.  Patient reports that he \"missed a step\" but family do note that he has been having some increased fatigue.  He " denies any pain, dyspnea or other symptoms at the time of my exam.        ROS  Review of Systems   Constitutional: Negative.   HENT: Negative.     Eyes: Negative.    Cardiovascular: Negative.    Respiratory: Negative.     Skin: Negative.    Musculoskeletal:  Positive for falls and joint pain.   Gastrointestinal: Negative.    Genitourinary: Negative.    Neurological: Negative.    Psychiatric/Behavioral: Negative.           Personal History     Past Medical History:   Past Medical History:   Diagnosis Date    Arthritis     Diabetes 1.5, managed as type 2     Heart disease        Surgical History:      Past Surgical History:   Procedure Laterality Date    CHOLECYSTECTOMY      HAND SURGERY      MELANOMA WIDE LOCAL EXCISION Right     cheek    PACEMAKER IMPLANTATION      SKIN GRAFT      scalp           Family History: family history is not on file. Otherwise pertinent FHx was reviewed and unremarkable.     Social History:  reports that he has never smoked. He has never used smokeless tobacco. He reports that he does not drink alcohol and does not use drugs.      Medications:  Prior to Admission medications    Medication Sig Start Date End Date Taking? Authorizing Provider   amiodarone (PACERONE) 200 MG tablet Take 1 tablet by mouth Daily. 10/9/24  Yes Ayse Hawkins MD   Eliquis 5 MG tablet tablet Take 1 tablet by mouth Every 12 (Twelve) Hours. 10/5/24  Yes Ayse Hawkins MD   furosemide (LASIX) 20 MG tablet Take 1 tablet by mouth Daily.   Yes Ayse Hawkins MD   losartan (COZAAR) 100 MG tablet Take 1 tablet by mouth Daily. 10/4/24  Yes Ayse Hawkins MD   metFORMIN (GLUCOPHAGE) 1000 MG tablet Take 1 tablet by mouth 2 (Two) Times a Day With Meals.   Yes Ayse Hawkins MD   pioglitazone (ACTOS) 30 MG tablet Take 1 tablet by mouth Daily. 8/29/24  Yes Ayse Hawkins MD   propranolol (INDERAL) 80 MG tablet Take 1 tablet by mouth Daily. 10/8/24  Yes Ayse Hawkins MD    simvastatin (ZOCOR) 20 MG tablet Take 1 tablet by mouth Daily. 10/4/24  Yes Provider, MD Ayse   tamsulosin (FLOMAX) 0.4 MG capsule 24 hr capsule Take 1 capsule by mouth Daily. 10/4/24  Yes Provider, Ayse, MD       Allergies:    Allergies   Allergen Reactions    Celebrex [Celecoxib] Hives       Objective  Objective     Vital Signs  Temp:  [97.5 °F (36.4 °C)-97.8 °F (36.6 °C)] 97.8 °F (36.6 °C)  Heart Rate:  [58-65] 60  Resp:  [17-18] 17  BP: (120-166)/(65-99) 166/81  SpO2:  [95 %-99 %] 98 %  on   ;   Device (Oxygen Therapy): room air  Body mass index is 27.65 kg/m².    Physical Exam  Vitals reviewed.   Constitutional:       General: He is not in acute distress.     Appearance: Normal appearance. He is normal weight. He is not ill-appearing, toxic-appearing or diaphoretic.   HENT:      Head: Normocephalic.      Right Ear: External ear normal.  Patient is very hard of hearing     Left Ear: External ear normal.      Nose: Nose normal.      Mouth/Throat:      Mouth: Mucous membranes are moist.   Eyes:      Extraocular Movements: Extraocular movements intact.   Cardiovascular:      Rate and Rhythm: Normal rate and regular rhythm.      Pulses: Normal pulses.      Heart sounds: Normal heart sounds.   Pulmonary:      Effort: Pulmonary effort is normal.      Breath sounds: Normal breath sounds.   Abdominal:      General: Bowel sounds are normal.      Palpations: Abdomen is soft.      Tenderness: There is no abdominal tenderness.   Musculoskeletal:         General: Normal range of motion.      Cervical back: Normal range of motion.      Right lower leg: No edema.      Left lower leg: No edema.   Skin:     General: Skin is warm and dry.      Capillary Refill: Capillary refill takes less than 2 seconds.      Findings: Lesion present.   Neurological:      General: No focal deficit present.      Mental Status: He is alert and oriented to person, place, and time.   Psychiatric:         Mood and Affect: Mood normal.          Behavior: Behavior normal.         Thought Content: Thought content normal.         Judgment: Judgment normal.     Results Review:  I have personally reviewed most recent lab results and radiology images and interpretations and agree with findings, most notably: BMP, CBC, magnesium and x-ray of knees and hip.    Results from last 7 days   Lab Units 11/27/24  0336   WBC 10*3/mm3 5.94   HEMOGLOBIN g/dL 10.8*   HEMATOCRIT % 32.6*   PLATELETS 10*3/mm3 183     Results from last 7 days   Lab Units 11/27/24  0336   SODIUM mmol/L 130*   POTASSIUM mmol/L 3.6   CHLORIDE mmol/L 92*   CO2 mmol/L 26.6   BUN mg/dL 21   CREATININE mg/dL 1.26   GLUCOSE mg/dL 173*   CALCIUM mg/dL 9.5   ALK PHOS U/L 85   ALT (SGPT) U/L 16   AST (SGOT) U/L 29     Estimated Creatinine Clearance: 35.9 mL/min (by C-G formula based on SCr of 1.26 mg/dL).  Brief Urine Lab Results  (Last result in the past 365 days)        Color   Clarity   Blood   Leuk Est   Nitrite   Protein   CREAT   Urine HCG        11/26/24 1726 Yellow   Clear   Negative   Negative   Negative   Negative                   Microbiology Results (last 10 days)       ** No results found for the last 240 hours. **            ECG/EMG Results (most recent)       Procedure Component Value Units Date/Time    Telemetry Scan [[id number]] Resulted: 11/26/24     Updated: 11/27/24 0324    Telemetry Scan [[id number]] Resulted: 11/26/24     Updated: 11/27/24 0325                    XR Hip With or Without Pelvis 2 - 3 View Left    Result Date: 11/26/2024  Impression: Negative for acute osseous abnormality. Electronically Signed: Damian Keen MD  11/26/2024 5:50 PM EST  Workstation ID: [id number]    XR Knee 1 or 2 View Bilateral    Result Date: 11/26/2024  Impression: 1.Degenerative changes in both knees. 2.No fractures are identified. 3.Soft tissue swelling laterally on the left. Electronically Signed: Jose Cassidy MD  11/26/2024 4:18 PM EST  Workstation ID: [id number]       Estimated Creatinine  Clearance: 35.9 mL/min (by C-G formula based on SCr of 1.26 mg/dL).    Assessment & Plan  Assessment/Plan       Active Hospital Problems    Diagnosis  POA    **Weakness [R53.1]  Yes      Resolved Hospital Problems   No resolved problems to display.       Weakness with fall  -Sodium: 132 addressed as below  -UA unremarkable  -X-ray of knee showed degenerative changes with no fractures and soft tissues swelling on the left  -X-ray of hip showed no acute abnormality  -PT/OT consulted recommending inpatient rehab  -Case management consulted  -UR recommends inpatient admission     Hypomagnesemia  -Magnesium: 1.3 on presentation  -Mag sulfate given in the ED     Hyponatremia (chronic)  -Sodium: 132, 130 (generally at baseline  -Monitor while admitted     Diabetes mellitus  -Moderately controlled         Lab Results   Component Value Date     GLUCOSE 173 (H) 11/27/2024     GLUCOSE 148 (H) 11/26/2024     GLUCOSE 274 (H) 06/24/2024     GLUCOSE 358 (H) 01/18/2023   -Hold metformin  -Continue Actos  -Correctional insulin ordered  -Diabetic diet  -Monitor before meals and at bedtime    Anemia  Lab Results   Component Value Date    HGB 10.8 (L) 11/27/2024    MCV 91.3 11/27/2024    MCHC 33.1 11/27/2024   -Monitor while admitted     Atrial fibrillation  -Continue amiodarone, propranolol and Eliquis     Hypertension  -Poorly controlled       BP Readings from Last 1 Encounters:   11/27/24 166/81   - Continue losartan and Lasix  - Monitor while admitted     Hyperlipidemia  -Statin     BPH  -Flomax          VTE Prophylaxis - Active VTE Prophylaxis  Pharmacologic:        Start     Dose Route Frequency Stop    11/27/24 0900  apixaban (ELIQUIS) tablet 5 mg         5 mg PO Every 12 Hours Scheduled --                  Mechanical:        Start        11/26/24 1906  Maintain Sequential Compression Device  Continuous                              CODE STATUS:    Code Status and Medical Interventions: CPR (Attempt to Resuscitate); Full Support    Ordered at: 24 1906     Code Status (Patient has no pulse and is not breathing):    CPR (Attempt to Resuscitate)     Medical Interventions (Patient has pulse or is breathing):    Full Support       This patient has been examined wearing personal protective equipment.     I discussed the patient's findings and my recommendations with patient and nursing staff.      Signature:Electronically signed by Jayjay Ross PA-C, 24, 3:33 PM EST.                Electronically signed by Chuy Rick MD at 24 1226          Physician Progress Notes (last 48 hours)        Twyla Johnson MD at 24 [facility]9              [facility] MEDICINE SERVICE  DAILY PROGRESS NOTE    NAME: Ap Roe  : 1/15/1928  MRN: [id number]      LOS: 8 days     PROVIDER OF SERVICE: Twyla Johnson MD    Chief Complaint: Weakness    Subjective:     Interval History:  History taken from: patient    Patient otherwise appears well.  He is sitting up in his chair.  He slept well overnight.        Review of Systems:   Review of Systems   Constitutional: Negative.    Respiratory: Negative.     Cardiovascular: Negative.    Gastrointestinal: Negative.    Musculoskeletal: Negative.    Neurological: Negative.    Psychiatric/Behavioral: Negative.         Objective:     Vital Signs  Temp:  [97.4 °F (36.3 °C)-98.3 °F (36.8 °C)] 97.6 °F (36.4 °C)  Heart Rate:  [60-71] 60  Resp:  [13-21] 21  BP: ()/(37-65) 95/37   Body mass index is 27.65 kg/m².    Physical Exam  Physical Exam  Constitutional:       General: He is awake.      Appearance: Normal appearance. He is well-developed and well-groomed.   HENT:      Head: Normocephalic and atraumatic.      Nose: Nose normal.      Mouth/Throat:      Mouth: Mucous membranes are moist.      Pharynx: Oropharynx is clear.   Eyes:      Extraocular Movements: Extraocular movements intact.      Conjunctiva/sclera: Conjunctivae normal.      Pupils: Pupils are equal, round, and reactive to  light.   Cardiovascular:      Rate and Rhythm: Normal rate and regular rhythm.      Pulses: Normal pulses.      Heart sounds: Normal heart sounds.   Pulmonary:      Effort: Pulmonary effort is normal.      Breath sounds: Normal breath sounds.   Abdominal:      General: Abdomen is flat. Bowel sounds are normal.      Palpations: Abdomen is soft.   Musculoskeletal:         General: Normal range of motion.      Cervical back: Normal range of motion and neck supple.      Right lower leg: No edema.      Left lower leg: No edema.   Skin:     General: Skin is warm and dry.   Neurological:      General: No focal deficit present.      Mental Status: He is alert and oriented to person, place, and time. Mental status is at baseline.      Cranial Nerves: Cranial nerves 2-12 are intact.      Sensory: Sensation is intact.      Motor: Motor function is intact.   Psychiatric:         Mood and Affect: Mood normal.         Behavior: Behavior normal. Behavior is cooperative.         Thought Content: Thought content normal.         Judgment: Judgment normal.            Diagnostic Data    Results from last 7 days   Lab Units 12/05/24  1014 12/05/24  0943   WBC 10*3/mm3 6.03  --    HEMOGLOBIN g/dL 10.6*  --    HEMATOCRIT % 30.9*  --    PLATELETS 10*3/mm3 188  --    GLUCOSE mg/dL  --  181*   CREATININE mg/dL  --  1.09   BUN mg/dL  --  27*   SODIUM mmol/L  --  132*   POTASSIUM mmol/L  --  4.0   AST (SGOT) U/L  --  35   ALT (SGPT) U/L  --  13   ALK PHOS U/L  --  71   BILIRUBIN mg/dL  --  0.5   ANION GAP mmol/L  --  7.9       No radiology results for the last day      I reviewed the patient's new clinical results.    Assessment/Plan:     Active and Resolved Problems  Active Hospital Problems    Diagnosis  POA    **Weakness [R53.1]  Yes      Resolved Hospital Problems   No resolved problems to display.     Status post fall at home  Generalized weakness  Hypomagnesemia   Diabetes mellitus  Anemia of chronic disease  Atrial  fibrillation  Hypertension  Acute confusional state  Hyponatremia, chronic--with worsening this hospital admission  Acute kidney injury      No labs done this morning.  Yesterday's labs have been reviewed.  Nephrology continues to follow.  Noted decreased blood pressure this morning.  Albumin orders given as per nephrology.  Patient is otherwise comfortable and sitting up in his chair this morning.  He no longer endorses any confusion.  He is alert and orient x 4.  There is a strong likelihood the patient will benefit significantly from acute inpatient rehab secondary to excellent functional status that he had prior to being admitted.  Patient was independent at home.  He was driving and manages his own medications as well as finances on his own independently.  Given his prolonged hospital course and daily deconditioning I would strongly advised patient to be discharged to an acute inpatient rehab facility.  Patient is currently being held during the appeal process to an acute inpatient rehab facility.  Noted therapy evaluation have also made similar recommendations.          VTE Prophylaxis:  Pharmacologic VTE prophylaxis orders are present.             Disposition Planning:     Barriers to Discharge: Insurance approval  Anticipated Date of Discharge: 12/6/2024  Place of Discharge: Rehab      Time: 45 minutes     Code Status and Medical Interventions: No CPR (Do Not Attempt to Resuscitate); Full Support   Ordered at: 11/28/24 1024     Level Of Support Discussed With:    Next of Kin (If No Surrogate)     Code Status (Patient has no pulse and is not breathing):    No CPR (Do Not Attempt to Resuscitate)     Medical Interventions (Patient has pulse or is breathing):    Full Support       Signature: Electronically signed by Twyla Johnson MD, 12/05/24, 10:29 EST.  [facility] Hospitalist Team      Electronically signed by Twyla Johnson MD at 12/05/24 5121       Darrell Perez MD at 12/04/24 0386               PROGRESS NOTE      Patient Name: Ap Roe  : 1/15/1928  MRN: [id number]  Primary Care Physician: Elva Mota MD  Date of admission: 2024    Patient Care Team:  Elva Mota MD as PCP - General (Internal Medicine)        Subjective   Subjective:     Patient seen and examined   NAD noted  Creatinine essentially unchanged    Review of systems:  ROS negative      Allergies:    Allergies   Allergen Reactions    Celebrex [Celecoxib] Hives       Objective   Exam:     Vital Signs  Temp:  [97.5 °F (36.4 °C)-97.7 °F (36.5 °C)] 97.5 °F (36.4 °C)  Heart Rate:  [60-68] 68  Resp:  [16-20] 16  BP: (104-141)/(52-65) 141/65  SpO2:  [95 %-98 %] 97 %  on   ;   Device (Oxygen Therapy): room air  Body mass index is 26.85 kg/m².    General:   male in no acute distress.    Head:      Normocephalic and atraumatic.    Eyes:      PERRL/EOM intact, conjunctivae and sclerae clear without nystagmus.    Neck:      No masses, thyromegaly,  trachea central with normal respiratory effort   Lungs:    Clear bilaterally to auscultation.    Heart:      Regular rate and rhythm, no murmur no gallop  Abd:        Soft, nontender, not distended, bowel sounds positive, no shifting dullness   Pulses:   Pulses palpable  Extr:        No cyanosis or clubbing--+edema.    Neuro:    No focal deficits.   alert oriented x3  Skin:       Intact without lesions or rashes.    Psych:    Alert and cooperative; normal mood and affect; .      Results Review:  I have personally reviewed most recent Data :  CBC    Results from last 7 days   Lab Units 24  0301 24  0832 24  0044   WBC 10*3/mm3 5.67 5.77 5.09   HEMOGLOBIN g/dL 10.6* 9.9* 10.3*   PLATELETS 10*3/mm3 184 164 200     CMP   Results from last 7 days   Lab Units 24  0301 24  1717 24  0832 24  0044 24  0554   SODIUM mmol/L 128* 123* 130* 129* 132*   POTASSIUM mmol/L 4.8 5.3* 3.9 4.1 3.6   CHLORIDE mmol/L 95* 95* 97* 94* 94*   CO2 mmol/L 22.1  19.2* 22.3 24.6 25.2   BUN mg/dL 34* 33* 25* 24* 21   CREATININE mg/dL 1.46* 1.54* 1.18 1.38* 1.22   GLUCOSE mg/dL 175* 276* 141* 108* 136*   ALBUMIN g/dL  --  3.4* 3.3*  --   --    BILIRUBIN mg/dL  --  0.5 0.6  --   --    ALK PHOS U/L  --  102 75  --   --    AST (SGOT) U/L  --  34 27  --   --    ALT (SGPT) U/L  --  16 14  --   --      ABG      No radiology results for the last day      Scheduled Meds:amiodarone, 200 mg, Oral, Daily  apixaban, 5 mg, Oral, Q12H  atorvastatin, 10 mg, Oral, Daily  furosemide, 20 mg, Oral, Daily  insulin lispro, 2-7 Units, Subcutaneous, 4x Daily AC & at Bedtime  losartan, 50 mg, Oral, Daily  pioglitazone, 30 mg, Oral, Daily  propranolol, 80 mg, Oral, Daily  sodium chloride, 10 mL, Intravenous, Q12H  tamsulosin, 0.4 mg, Oral, Daily      Continuous Infusions:   PRN Meds:  senna-docusate sodium **AND** polyethylene glycol **AND** bisacodyl **AND** bisacodyl    dextrose    dextrose    glucagon (human recombinant)    Magnesium Standard Dose Replacement - Follow Nurse / BPA Driven Protocol    melatonin    [COMPLETED] Insert Peripheral IV **AND** sodium chloride    sodium chloride    sodium chloride    Assessment & Plan   Assessment and Plan:         Weakness    ASSESSMENT:  Acute kidney injury: Likely related to angiotensin receptor blocker and may be some cardiorenal syndrome specially with the presence of diastolic dysfunctions  Congestive heart failure with grade 1 diastolic dysfunction  History of hypertension  History of diabetes mellitus  Atrial fibrillation              PLAN :   Patient with GI as above, creatinine relatively unchanged today   Patient has significant edema and with some crackles in the lungs, has grade 1 diastolic dysfunctions. S/p 1X dose of Bumex yesterday, continue scheduled Lasix  Hyperkalemia resolved s/p Lokelma   Potassium level is getting better  Sodium level improving appropriately s/p dose of Urea yesterday.   As hemodynamics better will increase diuretics  today  Remaining electrolytes/acid base stable  Manage blood sugar aggressively that also contributing to some hyponatremia   Blood pressure stable on decreased dose of Losartan, continue   Daily labs   We will continue to follow        Electronically signed by   Darrell Perez MD.   [facility] kidney consultant  [phone]  2024  11:23 EST       Electronically signed by Darrell Perez MD at 24 1614       Twyla Johnson MD at 24 0945              [facility] MEDICINE SERVICE  DAILY PROGRESS NOTE    NAME: Ap Roe  : 1/15/1928  MRN: [id number]      LOS: 7 days     PROVIDER OF SERVICE: Twyla Johnson MD    Chief Complaint: Weakness    Subjective:     Interval History:  History taken from: patient    Patient is otherwise doing well this morning.  Comfortable.  No acute overnight events.        Review of Systems:   Review of Systems   Constitutional: Negative.    Respiratory: Negative.     Cardiovascular: Negative.    Gastrointestinal: Negative.    Musculoskeletal: Negative.    Neurological: Negative.    Psychiatric/Behavioral: Negative.         Objective:     Vital Signs  Temp:  [97.5 °F (36.4 °C)-97.7 °F (36.5 °C)] 97.5 °F (36.4 °C)  Heart Rate:  [60-68] 68  Resp:  [16-20] 16  BP: (104-141)/(52-65) 141/65   Body mass index is 26.85 kg/m².    Physical Exam  Physical Exam  Constitutional:       General: He is awake.      Appearance: Normal appearance. He is well-developed and well-groomed.   HENT:      Head: Normocephalic and atraumatic.      Nose: Nose normal.      Mouth/Throat:      Mouth: Mucous membranes are moist.      Pharynx: Oropharynx is clear.   Eyes:      Extraocular Movements: Extraocular movements intact.      Conjunctiva/sclera: Conjunctivae normal.      Pupils: Pupils are equal, round, and reactive to light.   Cardiovascular:      Rate and Rhythm: Normal rate and regular rhythm.      Pulses: Normal pulses.      Heart sounds: Normal heart sounds.   Pulmonary:      Effort:  Pulmonary effort is normal.      Breath sounds: Normal breath sounds.   Abdominal:      General: Abdomen is flat. Bowel sounds are normal.      Palpations: Abdomen is soft.   Musculoskeletal:         General: Normal range of motion.      Cervical back: Normal range of motion and neck supple.      Right lower leg: No edema.      Left lower leg: No edema.   Skin:     General: Skin is warm and dry.   Neurological:      General: No focal deficit present.      Mental Status: He is alert and oriented to person, place, and time. Mental status is at baseline.      Cranial Nerves: Cranial nerves 2-12 are intact.      Sensory: Sensation is intact.      Motor: Motor function is intact.   Psychiatric:         Mood and Affect: Mood normal.         Behavior: Behavior normal. Behavior is cooperative.         Thought Content: Thought content normal.         Judgment: Judgment normal.            Diagnostic Data    Results from last 7 days   Lab Units 12/04/24  0301 12/02/24  1717   WBC 10*3/mm3 5.67  --    HEMOGLOBIN g/dL 10.6*  --    HEMATOCRIT % 31.2*  --    PLATELETS 10*3/mm3 184  --    GLUCOSE mg/dL 175* 276*   CREATININE mg/dL 1.46* 1.54*   BUN mg/dL 34* 33*   SODIUM mmol/L 128* 123*   POTASSIUM mmol/L 4.8 5.3*   AST (SGOT) U/L  --  34   ALT (SGPT) U/L  --  16   ALK PHOS U/L  --  102   BILIRUBIN mg/dL  --  0.5   ANION GAP mmol/L 10.9 8.8       No radiology results for the last day      I reviewed the patient's new clinical results.    Assessment/Plan:     Active and Resolved Problems  Active Hospital Problems    Diagnosis  POA    **Weakness [R53.1]  Yes      Resolved Hospital Problems   No resolved problems to display.   Status post fall at home  Generalized weakness  Hypomagnesemia   Diabetes mellitus  Anemia of chronic disease  Atrial fibrillation  Hypertension  Acute confusional state  Hyponatremia, chronic--with worsening this hospital admission  Acute kidney injury       Noted laboratory studies.  Sodium is risen to 128.   Nephrology following.  Bumex dosage given yesterday.  Patient has now been resumed back on Lasix.  Echocardiogram conducted this morning.  Results currently pending.    Confusion is now resolved.  It appears it was likely secondary to sleep deprivation.  Patient is now off trazodone as well.    Given independence with ADLs prior to admission patient would likely benefit from acute inpatient rehab as well as up to 15 hours of therapy on a weekly basis.  Patient did have an increased functional status prior to admission after reviewing and/or discussing with patient's daughter who was present at bedside.  Patient was independent driving as well as managing his own medications at home.  He was otherwise doing well.  Given prolonged hospital course and daily deconditioning I would strongly advised patient to be discharged to an acute inpatient rehab facility.    While being at the acute inpatient rehab facility patient would benefit from daily labs as well as 3 hours of therapy on a daily basis.        VTE Prophylaxis:  Pharmacologic & mechanical VTE prophylaxis orders are present.             Disposition Planning:     Barriers to Discharge: Insurance approval for rehab placement  Anticipated Date of Discharge: 12/6/2024  Place of Discharge: Rehab versus home pending above.      Time: 25 minutes     Code Status and Medical Interventions: No CPR (Do Not Attempt to Resuscitate); Full Support   Ordered at: 11/28/24 1024     Level Of Support Discussed With:    Next of Kin (If No Surrogate)     Code Status (Patient has no pulse and is not breathing):    No CPR (Do Not Attempt to Resuscitate)     Medical Interventions (Patient has pulse or is breathing):    Full Support       Signature: Electronically signed by Twyla Johnson MD, 12/04/24, 09:45 EST.  [facility] Hospitalist Team      Electronically signed by Twyla Johnson MD at 12/04/24 0948       Consult Notes (last 48 hours)  Notes from 12/03/24 1119 through 12/05/24  1119   No notes of this type exist for this encounter.          Physical Therapy Notes (last 48 hours)        Sarah Lima, PT at 24 1501  Version 2 of 2         Subjective: Pt agreeable to therapeutic plan of care.  Patient cleared for therapy by nursing.  Patient up in chair and agreeable A&Ox4    Objective:     Precautions - falls    Bed mobility - NA  Transfers - CGA cues for hand placement  Ambulation - 45x2 feet CGA, Min-A, and with rolling walker, cues for posture, RW management, and pacing    Sit<>stand x5 reps for strengthening and hand placement with CGA for balance    Static and dynamic standing reaching withn SHELIA with CGA with RW 2x1-2 minutes    Therapeutic Exercise - standing:MIP, heel raises, toe raises x10 reps with BUE support and CGA; seated: AP, LAQ, MIP, hip ab/add x10-15 reps with instruction for completion of seated acts when inactive and alone.       Vitals: WNL  Sittin/47mmHG, 60bpm, 97% RA  Standin/60, 86bpm, 94% RA    Pain: 0 VAS       Education: Provided education on the importance of mobility in the acute care setting, Transfer Training, and Gait Training.  Increased time discussing rehab process and dc dispo with patient and family.    Assessment: Ap Roe presents with functional mobility impairments which indicate the need for skilled intervention. Patient demonstrates steady progress with mobility with use of RW continues to require cues for RW management and safety as device use is novel to him.  He requires up to [place] for mobility and balance.  He has excellent family support, motivation, and rehab potential such that the IRF level of care would be appropriate.  He demonstrates elevated falls risk and reduced independence with functional mobility from his baseline.  If IRF does not accept second choice would be SNF level of care but patient will not progress as quickly to his home environment with a lesser intensity of therapy.   Tolerating session today  without incident. Will continue to follow and progress as tolerated.     Plan/Recommendations:   If medically appropriate, High Intensity Therapy recommended post-acute care. This is recommended as therapy feels the patient would require 5-6 days per week, 2-3 hours per day. At this time, inpatient rehabilitation (acute rehab) would be the first choice and SNF would be second. Pt requires no DME at discharge.     Pt desires Inpatient Rehabilitation placement at discharge. Pt cooperative; agreeable to therapeutic recommendations and plan of care.         Basic Mobility 6-click:  Rollin = Total, A lot = 2, A little = 3; 4 = None  Supine>Sit:   1 = Total, A lot = 2, A little = 3; 4 = None   Sit>Stand with arms:  1 = Total, A lot = 2, A little = 3; 4 = None  Bed>Chair:   1 = Total, A lot = 2, A little = 3; 4 = None  Ambulate in room:  1 = Total, A lot = 2, A little = 3; 4 = None  3-5 Steps with railin = Total, A lot = 2, A little = 3; 4 = None  Score: 17    Modified [place]: N/A = No pre-op stroke/TIA    Post-Tx Position: Up in Chair, Alarms activated, and Call light and personal items within reach  PPE: gloves           Electronically signed by Sarah Lima, PT at 24 1609       Sarah Lima, PT at 24 1503  Version 1 of 2         Subjective: Pt agreeable to therapeutic plan of care.  Patient cleared for therapy by nursing.  Patient up in chair and agreeable A&Ox4    Objective:     Precautions - falls    Bed mobility - NA  Transfers - CGA cues for hand placement  Ambulation - 45x2 feet CGA, Min-A, and with rolling walker, cues for posture, RW management, and pacing    Sit<>stand x5 reps for strengthening and hand placement with CGA for balance    Static and dynamic standing reaching withn SHELIA with CGA with RW 2x1-2 minutes    Therapeutic Exercise - standing:MIP, heel raises, toe raises x10 reps with BUE support and CGA; seated: AP, LAQ, MIP, hip ab/add x10-15 reps with instruction  for completion of seated acts when inactive and alone.       Vitals: WNL  Sittin/47mmHG, 60bpm, 97% RA  Standin/60, 86bpm, 94% RA    Pain: 0 VAS       Education: Provided education on the importance of mobility in the acute care setting, Transfer Training, and Gait Training    Assessment: Ap Roe presents with functional mobility impairments which indicate the need for skilled intervention. Patient demonstrates steady progress with mobility with use of RW continues to require cues for RW management and safety as device use is novel to him.  He requires up to [place] for mobility and balance.  He has excellent family support, motivation, and rehab potential such that the IRF level of care would be appropriate.  He demonstrates elevated falls risk and reduced independence with functional mobility from his baseline.  If IRF does not accept second choice would be SNF level of care but patient will not progress as quickly to his home environment with a lesser intensity of therapy.   Tolerating session today without incident. Will continue to follow and progress as tolerated.     Plan/Recommendations:   If medically appropriate, High Intensity Therapy recommended post-acute care. This is recommended as therapy feels the patient would require 5-6 days per week, 2-3 hours per day. At this time, inpatient rehabilitation (acute rehab) would be the first choice and SNF would be second. Pt requires no DME at discharge.     Pt desires Inpatient Rehabilitation placement at discharge. Pt cooperative; agreeable to therapeutic recommendations and plan of care.         Basic Mobility 6-click:  Rollin = Total, A lot = 2, A little = 3; 4 = None  Supine>Sit:   1 = Total, A lot = 2, A little = 3; 4 = None   Sit>Stand with arms:  1 = Total, A lot = 2, A little = 3; 4 = None  Bed>Chair:   1 = Total, A lot = 2, A little = 3; 4 = None  Ambulate in room:  1 = Total, A lot = 2, A little = 3; 4 = None  3-5 Steps with  railin = Total, A lot = 2, A little = 3; 4 = None  Score: 17    Modified [place]: N/A = No pre-op stroke/TIA    Post-Tx Position: Up in Chair, Alarms activated, and Call light and personal items within reach  PPE: gloves           Electronically signed by Sarah Lima, PT at 24 1607       Sarah Lima, PT at 24 1607  Version 1 of 1         Goal Outcome Evaluation:      Assessment: Ap Roe presents with functional mobility impairments which indicate the need for skilled intervention. Patient demonstrates steady progress with mobility with use of RW continues to require cues for RW management and safety as device use is novel to him.  He requires up to [place] for mobility and balance.  He has excellent family support, motivation, and rehab potential such that the IRF level of care would be appropriate.  He demonstrates elevated falls risk and reduced independence with functional mobility from his baseline.  If IRF does not accept second choice would be SNF level of care but patient will not progress as quickly to his home environment with a lesser intensity of therapy.   Tolerating session today without incident. Will continue to follow and progress as tolerated.             Anticipated Discharge Disposition (PT): inpatient rehabilitation facility                          Electronically signed by Sarah Lima, PT at 24 1603       Occupational Therapy Notes (last 48 hours)  Notes from 24 1119 through 24 1119   No notes exist for this encounter.

## 2024-12-05 NOTE — CASE MANAGEMENT/SOCIAL WORK
Continued Stay Note  [facility]     Patient Name: Ap Roe  MRN: [id number]  Today's Date: 12/5/2024    Admit Date: 11/26/2024    Plan: [facility] accepted pending precert appeal, precert denied, appeal started 12/4 (can take up to 72hrs for determination) vs. [facility] accepted vs. [facility] accepted pending family decisions, will need precert, PASRR approved. From home with spouse.   Discharge Plan       Row Name 12/05/24 1436       Plan    Plan [facility] accepted pending precert appeal, precert denied, appeal started 12/4 (can take up to 72hrs for determination) vs. [facility] accepted vs. [facility] accepted pending family decisions, will need precert, PASRR approved. From home with spouse.    Patient/Family in Agreement with Plan yes    Plan Comments CM met with patient's daughter, Joyce, at bedside. Daughter interested in referrals to [facility], [facility] and [facility] in the event appeal is denied. CM contacted [facility] liaison, Vanessa, who states patient was denied by [facility], CM updated daughter at bedside. [facility] and [facility] can both accept per liaisons, daughter plans to make facility visits tomorrow prior to final decision. CM updated both liaisons. Barrier to D/C: BP monitoring, nephrology following.                      Expected Discharge Date and Time       Expected Discharge Date Expected Discharge Time    Dec 7, 2024           Met with patient in room.  Maintained distance greater than six feet and spent less than 15 minutes in the room.       OCTAVIA Johnson[place], [place] [address]    Office: [phone]  Fax: [phone]

## 2024-12-05 NOTE — PROGRESS NOTES
[facility] MEDICINE SERVICE  DAILY PROGRESS NOTE    NAME: Ap Roe  : 1/15/1928  MRN: [id number]      LOS: 8 days     PROVIDER OF SERVICE: Twyla Johnson MD    Chief Complaint: Weakness    Subjective:     Interval History:  History taken from: patient    Patient otherwise appears well.  He is sitting up in his chair.  He slept well overnight.        Review of Systems:   Review of Systems   Constitutional: Negative.    Respiratory: Negative.     Cardiovascular: Negative.    Gastrointestinal: Negative.    Musculoskeletal: Negative.    Neurological: Negative.    Psychiatric/Behavioral: Negative.         Objective:     Vital Signs  Temp:  [97.4 °F (36.3 °C)-98.3 °F (36.8 °C)] 97.6 °F (36.4 °C)  Heart Rate:  [60-71] 60  Resp:  [13-21] 21  BP: ()/(37-65) 95/37   Body mass index is 27.65 kg/m².    Physical Exam  Physical Exam  Constitutional:       General: He is awake.      Appearance: Normal appearance. He is well-developed and well-groomed.   HENT:      Head: Normocephalic and atraumatic.      Nose: Nose normal.      Mouth/Throat:      Mouth: Mucous membranes are moist.      Pharynx: Oropharynx is clear.   Eyes:      Extraocular Movements: Extraocular movements intact.      Conjunctiva/sclera: Conjunctivae normal.      Pupils: Pupils are equal, round, and reactive to light.   Cardiovascular:      Rate and Rhythm: Normal rate and regular rhythm.      Pulses: Normal pulses.      Heart sounds: Normal heart sounds.   Pulmonary:      Effort: Pulmonary effort is normal.      Breath sounds: Normal breath sounds.   Abdominal:      General: Abdomen is flat. Bowel sounds are normal.      Palpations: Abdomen is soft.   Musculoskeletal:         General: Normal range of motion.      Cervical back: Normal range of motion and neck supple.      Right lower leg: No edema.      Left lower leg: No edema.   Skin:     General: Skin is warm and dry.   Neurological:      General: No focal deficit present.      Mental  Status: He is alert and oriented to person, place, and time. Mental status is at baseline.      Cranial Nerves: Cranial nerves 2-12 are intact.      Sensory: Sensation is intact.      Motor: Motor function is intact.   Psychiatric:         Mood and Affect: Mood normal.         Behavior: Behavior normal. Behavior is cooperative.         Thought Content: Thought content normal.         Judgment: Judgment normal.            Diagnostic Data    Results from last 7 days   Lab Units 12/05/24  1014 12/05/24  0943   WBC 10*3/mm3 6.03  --    HEMOGLOBIN g/dL 10.6*  --    HEMATOCRIT % 30.9*  --    PLATELETS 10*3/mm3 188  --    GLUCOSE mg/dL  --  181*   CREATININE mg/dL  --  1.09   BUN mg/dL  --  27*   SODIUM mmol/L  --  132*   POTASSIUM mmol/L  --  4.0   AST (SGOT) U/L  --  35   ALT (SGPT) U/L  --  13   ALK PHOS U/L  --  71   BILIRUBIN mg/dL  --  0.5   ANION GAP mmol/L  --  7.9       No radiology results for the last day      I reviewed the patient's new clinical results.    Assessment/Plan:     Active and Resolved Problems  Active Hospital Problems    Diagnosis  POA    **Weakness [R53.1]  Yes      Resolved Hospital Problems   No resolved problems to display.     Status post fall at home  Generalized weakness  Hypomagnesemia   Diabetes mellitus  Anemia of chronic disease  Atrial fibrillation  Hypertension  Acute confusional state  Hyponatremia, chronic--with worsening this hospital admission  Acute kidney injury      No labs done this morning.  Yesterday's labs have been reviewed.  Nephrology continues to follow.  Noted decreased blood pressure this morning.  Albumin orders given as per nephrology.  Patient is otherwise comfortable and sitting up in his chair this morning.  He no longer endorses any confusion.  He is alert and orient x 4.  There is a strong likelihood the patient will benefit significantly from acute inpatient rehab secondary to excellent functional status that he had prior to being admitted.  Patient was  independent at home.  He was driving and manages his own medications as well as finances on his own independently.  Given his prolonged hospital course and daily deconditioning I would strongly advised patient to be discharged to an acute inpatient rehab facility.  Patient is currently being held during the appeal process to an acute inpatient rehab facility.  Noted therapy evaluation have also made similar recommendations.          VTE Prophylaxis:  Pharmacologic VTE prophylaxis orders are present.             Disposition Planning:     Barriers to Discharge: Insurance approval  Anticipated Date of Discharge: 12/6/2024  Place of Discharge: Rehab      Time: 45 minutes     Code Status and Medical Interventions: No CPR (Do Not Attempt to Resuscitate); Full Support   Ordered at: 11/28/24 1024     Level Of Support Discussed With:    Next of Kin (If No Surrogate)     Code Status (Patient has no pulse and is not breathing):    No CPR (Do Not Attempt to Resuscitate)     Medical Interventions (Patient has pulse or is breathing):    Full Support       Signature: Electronically signed by Twyla Johnson MD, 12/05/24, 10:29 EST.  [facility] Hospitalist Team

## 2024-12-05 NOTE — PROGRESS NOTES
PROGRESS NOTE      Patient Name: Ap Roe  : 1/15/1928  MRN: [id number]  Primary Care Physician: Elva Mota MD  Date of admission: 2024    Patient Care Team:  Elva Mota MD as PCP - General (Internal Medicine)        Subjective   Subjective:     Patient seen and examined   NAD noted  Creatinine essentially unchanged    Review of systems:  ROS negative      Allergies:    Allergies   Allergen Reactions    Celebrex [Celecoxib] Hives       Objective   Exam:     Vital Signs  Temp:  [97.4 °F (36.3 °C)-98.3 °F (36.8 °C)] 97.9 °F (36.6 °C)  Heart Rate:  [60-71] 61  Resp:  [13-21] 15  BP: ()/(37-65) 117/49  SpO2:  [92 %-98 %] 98 %  on   ;   Device (Oxygen Therapy): room air  Body mass index is 27.65 kg/m².    General:   male in no acute distress.    Head:      Normocephalic and atraumatic.    Eyes:      PERRL/EOM intact, conjunctivae and sclerae clear without nystagmus.    Neck:      No masses, thyromegaly,  trachea central with normal respiratory effort   Lungs:    Clear bilaterally to auscultation.    Heart:      Regular rate and rhythm, no murmur no gallop  Abd:        Soft, nontender, not distended, bowel sounds positive, no shifting dullness   Pulses:   Pulses palpable  Extr:        No cyanosis or clubbing--+edema.    Neuro:    No focal deficits.   alert oriented x3  Skin:       Intact without lesions or rashes.    Psych:    Alert and cooperative; normal mood and affect; .      Results Review:  I have personally reviewed most recent Data :  CBC    Results from last 7 days   Lab Units 24  1014 24  0301 24  0832 24  0044   WBC 10*3/mm3 6.03 5.67 5.77 5.09   HEMOGLOBIN g/dL 10.6* 10.6* 9.9* 10.3*   PLATELETS 10*3/mm3 188 184 164 200     CMP   Results from last 7 days   Lab Units 24  0943 24  0301 24  1717 24  0832 24  0044   SODIUM mmol/L 132* 128* 123* 130* 129*   POTASSIUM mmol/L 4.0 4.8 5.3* 3.9 4.1   CHLORIDE mmol/L 105 95* 95*  97* 94*   CO2 mmol/L 19.1* 22.1 19.2* 22.3 24.6   BUN mg/dL 27* 34* 33* 25* 24*   CREATININE mg/dL 1.09 1.46* 1.54* 1.18 1.38*   GLUCOSE mg/dL 181* 175* 276* 141* 108*   ALBUMIN g/dL 2.6*  --  3.4* 3.3*  --    BILIRUBIN mg/dL 0.5  --  0.5 0.6  --    ALK PHOS U/L 71  --  102 75  --    AST (SGOT) U/L 35  --  34 27  --    ALT (SGPT) U/L 13  --  16 14  --      ABG      No radiology results for the last day    Results for orders placed during the hospital encounter of 11/26/24    Adult Transthoracic Echo Complete W/ Cont if Necessary Per Protocol    Interpretation Summary    Left ventricular systolic function is low normal. Left ventricular ejection fraction appears to be 51 - 55%.    Left ventricular diastolic function is consistent with (grade Ia w/high LAP) impaired relaxation.    The left atrial cavity is moderately dilated.    Left atrial volume is moderately increased.    There is moderate calcification of the aortic valve.    Estimated right ventricular systolic pressure from tricuspid regurgitation is normal (<35 mmHg).    Scheduled Meds:amiodarone, 200 mg, Oral, Daily  apixaban, 5 mg, Oral, Q12H  atorvastatin, 10 mg, Oral, Daily  [START ON 12/6/2024] furosemide, 20 mg, Oral, Daily  insulin lispro, 2-7 Units, Subcutaneous, 4x Daily AC & at Bedtime  [START ON 12/6/2024] losartan, 25 mg, Oral, Daily  magnesium sulfate, 2 g, Intravenous, Q2H  pioglitazone, 30 mg, Oral, Daily  propranolol, 80 mg, Oral, Daily  sodium chloride, 10 mL, Intravenous, Q12H  tamsulosin, 0.4 mg, Oral, Daily      Continuous Infusions:   PRN Meds:  senna-docusate sodium **AND** polyethylene glycol **AND** bisacodyl **AND** bisacodyl    dextrose    dextrose    glucagon (human recombinant)    Magnesium Standard Dose Replacement - Follow Nurse / BPA Driven Protocol    melatonin    [COMPLETED] Insert Peripheral IV **AND** sodium chloride    sodium chloride    sodium chloride    Assessment & Plan   Assessment and Plan:          Weakness    ASSESSMENT:  Acute kidney injury: Likely related to angiotensin receptor blocker and may be some cardiorenal syndrome specially with the presence of diastolic dysfunctions  Congestive heart failure with grade 1 diastolic dysfunction  History of hypertension  History of diabetes mellitus  Atrial fibrillation              PLAN :   Patient with GI as above, creatinine relatively unchanged today   Patient has significant edema and with some crackles in the lungs, has grade 1 diastolic dysfunctions. S/p 1X dose of Bumex yesterday, continue scheduled Lasix  Hyperkalemia resolved s/p Lokelma   Potassium level is getting better  Blood pressure slightly soft today mild acidosis sodium level is acceptable  Continue some fluid restriction continue loop diuretics  Patient peripheral edema has improved  Continue Lasix 20 mg a day will increase to 20 mg twice daily if needed  I will give 1 dose of albumin to improve patient blood pressure  Remaining electrolytes/acid base stable  Manage blood sugar aggressively that also contributing to some hyponatremia   Blood pressure stable o will decrease losartan to 25 mg a day  Daily labs   We will continue to follow        Electronically signed by   Darrell Perez MD.   [facility] kidney consultant  [phone]  12/5/2024  15:07 EST

## 2024-12-05 NOTE — PLAN OF CARE
Goal Outcome Evaluation:   Today is uneventful. Albumin result normal, gave Albumin for low BP. Magnesium result below normal, replacing magnesium per protocol. [facility] appeal still pending.

## 2024-12-05 NOTE — CONSULTS
"Nutrition Services    Patient Name: Ap Roe  YOB: 1928  MRN: [id number]  Admission date: 11/26/2024      NUTRITION SCREENING      Trending Narrative: 12/5: Nutrition screening r/t LOS x 9 days. Pt presented to ED on 11/26 via EMS after a mechanical fall at home. Pt states that he tripped going up the steps in his garage and landed on his knees. Pt has remained confused. Tolerating po diet without noted issues at this time.        PO Diet: Diet: Diabetic; Consistent Carbohydrate; Fluid Consistency: Thin (IDDSI 0)   PO Supplements:    Trending PO Intake:  12/5: 85% average po intake x last 12 documented meals        Nutritionally-Pertinent Medications RDN Reviewed, C/W clinical course         Labs (reviewed below): Hyponatremia - management per attending.   Hypomagnesemia - replaced today     Results from last 7 days   Lab Units 12/05/24  0943 12/04/24  0301 12/02/24  1717 11/30/24  0832   SODIUM mmol/L 132* 128* 123* 130*   POTASSIUM mmol/L 4.0 4.8 5.3* 3.9   CHLORIDE mmol/L 105 95* 95* 97*   CO2 mmol/L 19.1* 22.1 19.2* 22.3   BUN mg/dL 27* 34* 33* 25*   CREATININE mg/dL 1.09 1.46* 1.54* 1.18   CALCIUM mg/dL 7.2* 9.3 8.9 8.6   BILIRUBIN mg/dL 0.5  --  0.5 0.6   ALK PHOS U/L 71  --  102 75   ALT (SGPT) U/L 13  --  16 14   AST (SGOT) U/L 35  --  34 27   GLUCOSE mg/dL 181* 175* 276* 141*     Results from last 7 days   Lab Units 12/05/24  1014 12/05/24  0943 12/04/24  0301 12/02/24  1717   MAGNESIUM mg/dL  --  1.3* 1.7 2.1   PHOSPHORUS mg/dL  --   --  4.1  --    HEMOGLOBIN g/dL 10.6*  --  10.6*  --    HEMATOCRIT % 30.9*  --  31.2*  --      Lab Results   Component Value Date    HGBA1C 8.10 (H) 06/24/2024          GI Function:  Last documented BM 12/4       Skin: 2+ edema: L/R legs, ankles, feet  1+ edema: L/R knees    Skin intact        Weight Review: Estimated body mass index is 27.65 kg/m² as calculated from the following:    Height as of this encounter: 172.7 cm (68\").    Weight as of this " encounter: 82.5 kg (181 lb 14.1 oz).    Comment:   12/5: 181#  Current weight c/w ~ 10 months ago    Wt Readings from Last 30 Encounters:   12/05/24 0429 82.5 kg (181 lb 14.1 oz)   12/04/24 0610 80.1 kg (176 lb 9.4 oz)   11/28/24 0410 85.3 kg (188 lb 0.8 oz)   11/26/24 2245 82.5 kg (181 lb 14.1 oz)   11/26/24 1521 80.7 kg (178 lb)   02/12/24 1241 80.7 kg (178 lb)   01/18/23 1506 80.7 kg (178 lb)          --       Nutrition Problem Statement: No nutrition diagnosis at this time.         Nutrition Intervention: Continue current po diet    Encourage good po intake.           Monitoring/Evaluation Per protocol, I&O, PO intake, Pertinent labs, Weight            RD to follow up per protocol.    Electronically signed by:  Natalia Brown RD  12/05/24 13:00 EST

## 2024-12-06 LAB
ANION GAP SERPL CALCULATED.3IONS-SCNC: 10.5 MMOL/L (ref 5–15)
BUN SERPL-MCNC: 32 MG/DL (ref 8–23)
BUN/CREAT SERPL: 21.9 (ref 7–25)
CALCIUM SPEC-SCNC: 8.9 MG/DL (ref 8.2–9.6)
CHLORIDE SERPL-SCNC: 94 MMOL/L (ref 98–107)
CO2 SERPL-SCNC: 22.5 MMOL/L (ref 22–29)
CREAT SERPL-MCNC: 1.46 MG/DL (ref 0.76–1.27)
EGFRCR SERPLBLD CKD-EPI 2021: 43.7 ML/MIN/1.73
GLUCOSE BLDC GLUCOMTR-MCNC: 166 MG/DL (ref 70–105)
GLUCOSE BLDC GLUCOMTR-MCNC: 213 MG/DL (ref 70–105)
GLUCOSE BLDC GLUCOMTR-MCNC: 216 MG/DL (ref 70–105)
GLUCOSE BLDC GLUCOMTR-MCNC: 223 MG/DL (ref 70–105)
GLUCOSE SERPL-MCNC: 169 MG/DL (ref 65–99)
MAGNESIUM SERPL-MCNC: 2.2 MG/DL (ref 1.7–2.3)
POTASSIUM SERPL-SCNC: 4.3 MMOL/L (ref 3.5–5.2)
SODIUM SERPL-SCNC: 127 MMOL/L (ref 136–145)

## 2024-12-06 PROCEDURE — 63710000001 INSULIN LISPRO (HUMAN) PER 5 UNITS: Performed by: PHYSICIAN ASSISTANT

## 2024-12-06 PROCEDURE — 97110 THERAPEUTIC EXERCISES: CPT

## 2024-12-06 PROCEDURE — 82948 REAGENT STRIP/BLOOD GLUCOSE: CPT | Performed by: PHYSICIAN ASSISTANT

## 2024-12-06 PROCEDURE — 97530 THERAPEUTIC ACTIVITIES: CPT

## 2024-12-06 PROCEDURE — 97535 SELF CARE MNGMENT TRAINING: CPT

## 2024-12-06 PROCEDURE — 83735 ASSAY OF MAGNESIUM: CPT | Performed by: FAMILY MEDICINE

## 2024-12-06 PROCEDURE — 80048 BASIC METABOLIC PNL TOTAL CA: CPT | Performed by: INTERNAL MEDICINE

## 2024-12-06 PROCEDURE — 97116 GAIT TRAINING THERAPY: CPT

## 2024-12-06 PROCEDURE — 82948 REAGENT STRIP/BLOOD GLUCOSE: CPT

## 2024-12-06 RX ADMIN — AMIODARONE HYDROCHLORIDE 200 MG: 200 TABLET ORAL at 09:19

## 2024-12-06 RX ADMIN — TAMSULOSIN HYDROCHLORIDE 0.4 MG: 0.4 CAPSULE ORAL at 09:19

## 2024-12-06 RX ADMIN — Medication 15 G: at 23:12

## 2024-12-06 RX ADMIN — INSULIN LISPRO 3 UNITS: 100 INJECTION, SOLUTION INTRAVENOUS; SUBCUTANEOUS at 21:34

## 2024-12-06 RX ADMIN — INSULIN LISPRO 3 UNITS: 100 INJECTION, SOLUTION INTRAVENOUS; SUBCUTANEOUS at 17:00

## 2024-12-06 RX ADMIN — PIOGLITAZONE 30 MG: 30 TABLET ORAL at 09:19

## 2024-12-06 RX ADMIN — INSULIN LISPRO 3 UNITS: 100 INJECTION, SOLUTION INTRAVENOUS; SUBCUTANEOUS at 12:09

## 2024-12-06 RX ADMIN — ATORVASTATIN CALCIUM 10 MG: 10 TABLET ORAL at 09:19

## 2024-12-06 RX ADMIN — APIXABAN 5 MG: 5 TABLET, FILM COATED ORAL at 21:34

## 2024-12-06 RX ADMIN — APIXABAN 5 MG: 5 TABLET, FILM COATED ORAL at 09:19

## 2024-12-06 RX ADMIN — INSULIN LISPRO 2 UNITS: 100 INJECTION, SOLUTION INTRAVENOUS; SUBCUTANEOUS at 09:19

## 2024-12-06 NOTE — PROGRESS NOTES
PROGRESS NOTE      Patient Name: Ap Roe  : 1/15/1928  MRN: [id number]  Primary Care Physician: Elva Mota MD  Date of admission: 2024    Patient Care Team:  Elva Mota MD as PCP - General (Internal Medicine)        Subjective   Subjective:     Patient seen and examined   NAD noted  Creatinine essentially unchanged    Review of systems:  ROS negative      Allergies:    Allergies   Allergen Reactions    Celebrex [Celecoxib] Hives       Objective   Exam:     Vital Signs  Temp:  [97.4 °F (36.3 °C)-98 °F (36.7 °C)] 97.5 °F (36.4 °C)  Heart Rate:  [60-73] 66  Resp:  [13-20] 16  BP: (107-131)/(43-62) 109/48  SpO2:  [94 %-98 %] 98 %  on   ;   Device (Oxygen Therapy): room air  Body mass index is 27.96 kg/m².    General:   male in no acute distress.    Head:      Normocephalic and atraumatic.    Eyes:      PERRL/EOM intact, conjunctivae and sclerae clear without nystagmus.    Neck:      No masses, thyromegaly,  trachea central with normal respiratory effort   Lungs:    Clear bilaterally to auscultation.    Heart:      Regular rate and rhythm, no murmur no gallop  Abd:        Soft, nontender, not distended, bowel sounds positive, no shifting dullness   Pulses:   Pulses palpable  Extr:        No cyanosis or clubbing--+edema.    Neuro:    No focal deficits.   alert oriented x3  Skin:       Intact without lesions or rashes.    Psych:    Alert and cooperative; normal mood and affect; .      Results Review:  I have personally reviewed most recent Data :  CBC    Results from last 7 days   Lab Units 24  1014 24  0301 24  0832   WBC 10*3/mm3 6.03 5.67 5.77   HEMOGLOBIN g/dL 10.6* 10.6* 9.9*   PLATELETS 10*3/mm3 188 184 164     CMP   Results from last 7 days   Lab Units 24  0735 24  0943 24  0301 24  1717 24  0832   SODIUM mmol/L 127* 132* 128* 123* 130*   POTASSIUM mmol/L 4.3 4.0 4.8 5.3* 3.9   CHLORIDE mmol/L 94* 105 95* 95* 97*   CO2 mmol/L 22.5  19.1* 22.1 19.2* 22.3   BUN mg/dL 32* 27* 34* 33* 25*   CREATININE mg/dL 1.46* 1.09 1.46* 1.54* 1.18   GLUCOSE mg/dL 169* 181* 175* 276* 141*   ALBUMIN g/dL  --  2.6*  --  3.4* 3.3*   BILIRUBIN mg/dL  --  0.5  --  0.5 0.6   ALK PHOS U/L  --  71  --  102 75   AST (SGOT) U/L  --  35  --  34 27   ALT (SGPT) U/L  --  13  --  16 14     ABG      No radiology results for the last day    Results for orders placed during the hospital encounter of 11/26/24    Adult Transthoracic Echo Complete W/ Cont if Necessary Per Protocol    Interpretation Summary    Left ventricular systolic function is low normal. Left ventricular ejection fraction appears to be 51 - 55%.    Left ventricular diastolic function is consistent with (grade Ia w/high LAP) impaired relaxation.    The left atrial cavity is moderately dilated.    Left atrial volume is moderately increased.    There is moderate calcification of the aortic valve.    Estimated right ventricular systolic pressure from tricuspid regurgitation is normal (<35 mmHg).    Scheduled Meds:amiodarone, 200 mg, Oral, Daily  apixaban, 5 mg, Oral, Q12H  atorvastatin, 10 mg, Oral, Daily  [Held by provider] furosemide, 20 mg, Oral, Daily  insulin lispro, 2-7 Units, Subcutaneous, 4x Daily AC & at Bedtime  losartan, 25 mg, Oral, Daily  pioglitazone, 30 mg, Oral, Daily  propranolol, 80 mg, Oral, Daily  sodium chloride, 10 mL, Intravenous, Q12H  tamsulosin, 0.4 mg, Oral, Daily      Continuous Infusions:   PRN Meds:  senna-docusate sodium **AND** polyethylene glycol **AND** bisacodyl **AND** bisacodyl    dextrose    dextrose    glucagon (human recombinant)    Magnesium Standard Dose Replacement - Follow Nurse / BPA Driven Protocol    melatonin    [COMPLETED] Insert Peripheral IV **AND** sodium chloride    sodium chloride    sodium chloride    Assessment & Plan   Assessment and Plan:         Weakness    ASSESSMENT:  Acute kidney injury: Likely related to angiotensin receptor blocker and may be some  cardiorenal syndrome specially with the presence of diastolic dysfunctions  Congestive heart failure with grade 1 diastolic dysfunction  History of hypertension  History of diabetes mellitus  Atrial fibrillation    PLAN :     Patient with acute kidney injury which is improving at this time  Significant edema has improved respiratory condition is stable renal functions are better on diuretics  Hyperkalemia resolved sodium level  Potassium level is getting better  Sodium level again dropped to 127 will give another dose of urea today  Patient peripheral edema has improved  Patient is still very lethargic but as per patient daughter he to get better at times  Continue Lasix 20 mg a day will increase to 20 mg twice daily if needed  Remaining electrolytes and acid-base acceptable  Manage blood sugar aggressively that also contributing to some hyponatremia   Blood pressure stable o will decrease losartan to 25 mg a day  Daily labs   We will continue to follow        Electronically signed by   Darrell Perez MD.   [facility] kidney consultant  [phone]  12/6/2024  15:13 EST

## 2024-12-06 NOTE — SIGNIFICANT NOTE
12/06/24 1614   Post Acute Pre-Cert Documentation   Request Submitted by Facility - Type: Post Acute   Post-Acute Authorization Type Submitted: SNF   Date Post Acute Pre-Cert Inititated per Facility 12/06/24   Verification from Payer Yes   Accepting Facility [facility] Discharge Date Requested 12/07/24   All Clinicals Submitted? Yes   Had Accepting Facility at Time of Submission Yes   Response Communicated to:    Authorization Number: [id number]   Post Acute Pre-Cert Initiated Comment LSW submitted SNF precert for [facility] via [facility] portal per CM request. Auth ID #: [id number]. SNF precert pending. CM made aware.

## 2024-12-06 NOTE — PLAN OF CARE
Goal Outcome Evaluation:         Assessment: Ap Roe presents with functional mobility impairments which indicate the need for skilled intervention.  Patient fatigues quickly and but vitals stable throughout.  Patient requires CGA-[place] for balance and is progressing slowly with balance and functional mobility.  His home set-up, falls risk,  and current reliance on RW are barriers to return home at this time.  Recommend SNF level of care as the patient would benefit from reduced intensity or rehabilitation for increased duration-- continue to anticipate eventual safe return to a home setting once LE strength and balance have improved.  Tolerating session today without incident. Will continue to follow and progress as tolerated.           Anticipated Discharge Disposition (PT): skilled nursing facility

## 2024-12-06 NOTE — CASE MANAGEMENT/SOCIAL WORK
Discharge Planning Assessment  [facility]     Patient Name: Ap Roe  MRN: [id number]  Today's Date: 12/6/2024    Admit Date: 11/26/2024    Plan: [facility] accepted pending precert appeal, precert denied, appeal started 12/4 (can take up tp 72hrs for determination) vs. [facility] accepted, precert started 12/6, PAS[facility] approved. From home with spouse.       Discharge Plan       Row Name 12/06/24 1602       Plan    Plan [facility] accepted pending precert appeal, precert denied, appeal started 12/4 (can take up tp 72hrs for determination) vs. [facility] accepted, precert started 12/6, PASRR approved. From home with spouse.    Patient/Family in Agreement with Plan yes    Plan Comments CM spoke to patient's daughterJoyce, at bedside. [facility] letter reviewed, verbal consent and copy provided. Daughter plans to visit [facility] today, CM updated liaisonBob. CM provided private paid caregiver list to daughter. CM rec'd call from daughterJoyce, after visiting [facility] that they could accept for rehab, but daughter also plans to visit [facility] today. CM contacted daughter after visit to [facility], daughter agreeable to [facility], private bed is available, CM confirmed this with [facility] liaisonSita. CM notified facility liaison that LSW will start precert today. CM updated RN/MD. Patient's daughter agreeable to plan. Barrier to D/C: replacing electrolytes, sodium monitoring (Na 127).                      Expected Discharge Date and Time       Expected Discharge Date Expected Discharge Time    Dec 8, 2024                Patient Forms       Row Name 12/06/24 1613       Patient Forms    Important Message from Medicare ([facility]) Delivered  12/6/24    Delivered to Support person  Joyce saeed    Method of delivery In person                  Met with patient in room.  Maintained distance greater than six feet and spent less than 15 minutes in the room.       Carla  OCTAVIA Ndi[place]N, [place] [address]    Office: [phone]  Fax: [phone]

## 2024-12-06 NOTE — DISCHARGE PLACEMENT REQUEST
"Luh Roe (89 y.o. Male)       Date of Birth   01/15/1928    Social Security Number       Address   [address] [place] [address]    Home Phone   [phone]    MRN   [id number]       Evangelical   Protestant    Marital Status                               Admission Date   24    Admission Type   Emergency    Admitting Provider   Shaji Loredo MD    Attending Provider   Twyla Johnson MD    Department, Room/Bed   Psychiatric,        Discharge Date       Discharge Disposition       Discharge Destination                                 Attending Provider: Twyla Johnson MD    Allergies: Celebrex [Celecoxib]    Isolation: None   Infection: None   Code Status: No CPR    Ht: 172.7 cm (68\")   Wt: 83.4 kg (183 lb 13.8 oz)    Admission Cmt: None   Principal Problem: Weakness [R53.1]                   Active Insurance as of 2024       Primary Coverage       Payor Plan Insurance Group Employer/Plan Group    HUMANA MEDICARE REPLACEMENT HUMANA MED ADV GROUP [id number]       Payor Plan Address Payor Plan Phone Number Payor Plan Fax Number Effective Dates    [address] [address] [phone]  2018 - None Entered    [facility] [address]         Subscriber Name Subscriber Birth Date Member ID       LUH ROE 1/15/1928 [id number]                     Emergency Contacts        (Rel.) Home Phone Work Phone Mobile Phone    MICHAEL MENDOZA (Daughter) -- -- [phone]    RILEY ROE (Spouse) [phone] -- --    RosannaJoyce (Daughter) [phone] -- --                 History & Physical        Hyacinth Preciado APRN at 24 1708       Attestation signed by Shaji Loredo MD at 24 0703    I have reviewed this documentation and agree.                      [facility] Medicine Services  History & Physical    Patient Name: Luh Roe  : 1/15/1928  MRN: [id number]  Primary Care Physician:  Elva Mota MD  Date of admission: " 11/26/2024  Date and Time of Service: 11/26/2024 at 1709    Subjective      Chief Complaint: fall    History of Present Illness: Ap Roe is a 89 y.o. male with a CMH of diabetes, coronary artery disease who presented to [facility] on 11/26/2024 with a fall that occurred at home.  He said he was walking up the steps in his garage and fell landing on his knees.  He is also complaining of left hip pain. He did not hit his head.  On ED evaluation no fractures were identified. Patient was originally admitted under observation status.  PT and OT have evaluated this patient and they are recommending inpatient rehab. Hospitalist team to admit for further medical management.      Review of Systems   Constitutional:  Positive for fatigue.   Musculoskeletal:  Positive for arthralgias and gait problem.   Neurological:  Positive for weakness.       Personal History     Past Medical History:   Diagnosis Date    Arthritis     Diabetes 1.5, managed as type 2     Heart disease        Past Surgical History:   Procedure Laterality Date    CHOLECYSTECTOMY      HAND SURGERY      MELANOMA WIDE LOCAL EXCISION Right     cheek    PACEMAKER IMPLANTATION      SKIN GRAFT      scalp       Family History: family history is not on file. Otherwise pertinent FHx was reviewed and not pertinent to current issue.    Social History:  reports that he has never smoked. He has never used smokeless tobacco. He reports that he does not drink alcohol and does not use drugs.    Home Medications:  Prior to Admission Medications       Prescriptions Last Dose Informant Patient Reported? Taking?    amiodarone (PACERONE) 200 MG tablet 11/26/2024  Yes Yes    Take 1 tablet by mouth Daily.    Eliquis 5 MG tablet tablet 11/26/2024  Yes Yes    Take 1 tablet by mouth Every 12 (Twelve) Hours.    furosemide (LASIX) 20 MG tablet 11/26/2024  Yes Yes    Take 1 tablet by mouth Daily.    losartan (COZAAR) 100 MG tablet 11/26/2024  Yes Yes    Take 1 tablet by  mouth Daily.    metFORMIN (GLUCOPHAGE) 1000 MG tablet 11/26/2024  Yes Yes    Take 1 tablet by mouth 2 (Two) Times a Day With Meals.    pioglitazone (ACTOS) 30 MG tablet 11/26/2024  Yes Yes    Take 1 tablet by mouth Daily.    propranolol (INDERAL) 80 MG tablet 11/26/2024  Yes Yes    Take 1 tablet by mouth Daily.    simvastatin (ZOCOR) 20 MG tablet 11/26/2024  Yes Yes    Take 1 tablet by mouth Daily.    tamsulosin (FLOMAX) 0.4 MG capsule 24 hr capsule 11/26/2024  Yes Yes    Take 1 capsule by mouth Daily.              Allergies:  Allergies   Allergen Reactions    Celebrex [Celecoxib] Hives       Objective      Vitals:   Temp:  [97.5 °F (36.4 °C)-97.8 °F (36.6 °C)] 97.8 °F (36.6 °C)  Heart Rate:  [60-61] 60  Resp:  [17-18] 17  BP: (120-166)/(65-99) 166/81  Body mass index is 27.65 kg/m².  Physical Exam  Vitals and nursing note reviewed.   Constitutional:       Appearance: Normal appearance.   HENT:      Head: Normocephalic and atraumatic.      Nose: Nose normal.      Mouth/Throat:      Mouth: Mucous membranes are moist.   Eyes:      Extraocular Movements: Extraocular movements intact.      Pupils: Pupils are equal, round, and reactive to light.   Cardiovascular:      Rate and Rhythm: Normal rate and regular rhythm.      Pulses: Normal pulses.   Pulmonary:      Effort: Pulmonary effort is normal.      Breath sounds: Normal breath sounds.   Abdominal:      General: Bowel sounds are normal.      Palpations: Abdomen is soft.   Musculoskeletal:         General: Normal range of motion.      Cervical back: Normal range of motion and neck supple.      Right lower leg: Edema present.      Left lower leg: Edema present.   Skin:     General: Skin is warm.      Capillary Refill: Capillary refill takes less than 2 seconds.      Findings: Lesion (left knee) present.   Neurological:      Mental Status: He is alert and oriented to person, place, and time.         Diagnostic Data:  Lab Results (last 24 hours)       Procedure Component  Value Units Date/Time    POC Glucose Once [[id number]]  (Abnormal) Collected: 11/27/24 1627    Specimen: Blood Updated: 11/27/24 1628     Glucose 169 mg/dL      Comment: Serial Number: [id number]:  [id number]       POC Glucose 4x Daily Before Meals & at Bedtime [[id number]]  (Abnormal) Collected: 11/27/24 1142    Specimen: Blood Updated: 11/27/24 1144     Glucose 211 mg/dL      Comment: Serial Number: [id number]:  [id number]       POC Glucose 4x Daily Before Meals & at Bedtime [[id number]]  (Abnormal) Collected: 11/27/24 0733    Specimen: Blood Updated: 11/27/24 0735     Glucose 187 mg/dL      Comment: Serial Number: [id number]:  [id number]       Comprehensive Metabolic Panel [[id number]]  (Abnormal) Collected: 11/27/24 0336    Specimen: Blood from Arm, Right Updated: 11/27/24 0417     Glucose 173 mg/dL      BUN 21 mg/dL      Creatinine 1.26 mg/dL      Sodium 130 mmol/L      Potassium 3.6 mmol/L      Chloride 92 mmol/L      CO2 26.6 mmol/L      Calcium 9.5 mg/dL      Total Protein 6.5 g/dL      Albumin 3.7 g/dL      ALT (SGPT) 16 U/L      AST (SGOT) 29 U/L      Alkaline Phosphatase 85 U/L      Total Bilirubin 0.8 mg/dL      Globulin 2.8 gm/dL      A/G Ratio 1.3 g/dL      BUN/Creatinine Ratio 16.7     Anion Gap 11.4 mmol/L      eGFR 52.2 mL/min/1.73     Narrative:      GFR Normal >60  Chronic Kidney Disease <60  Kidney Failure <15    The GFR formula is only valid for adults with stable renal function between ages 18 and 70.    CBC & Differential [[id number]]  (Abnormal) Collected: 11/27/24 0336    Specimen: Blood from Arm, Right Updated: 11/27/24 0356    Narrative:      The following orders were created for panel order CBC & Differential.  Procedure                               Abnormality         Status                     ---------                               -----------         ------                     CBC Auto Differential[[id number]]        Abnormal            Final result                 Please  view results for these tests on the individual orders.    CBC Auto Differential [[id number]]  (Abnormal) Collected: 11/27/24 0336    Specimen: Blood from Arm, Right Updated: 11/27/24 0356     WBC 5.94 10*3/mm3      RBC 3.57 10*6/mm3      Hemoglobin 10.8 g/dL      Hematocrit 32.6 %      MCV 91.3 fL      MCH 30.3 pg      MCHC 33.1 g/dL      RDW 14.0 %      RDW-SD 46.6 fl      MPV 7.9 fL      Platelets 183 10*3/mm3      Neutrophil % 58.0 %      Lymphocyte % 24.2 %      Monocyte % 16.5 %      Eosinophil % 0.5 %      Basophil % 0.3 %      Immature Grans % 0.5 %      Neutrophils, Absolute 3.44 10*3/mm3      Lymphocytes, Absolute 1.44 10*3/mm3      Monocytes, Absolute 0.98 10*3/mm3      Eosinophils, Absolute 0.03 10*3/mm3      Basophils, Absolute 0.02 10*3/mm3      Immature Grans, Absolute 0.03 10*3/mm3      nRBC 0.0 /100 WBC     Comprehensive Metabolic Panel [[id number]]  (Abnormal) Collected: 11/26/24 1732    Specimen: Blood Updated: 11/26/24 1824     Glucose 148 mg/dL      BUN 23 mg/dL      Creatinine 1.27 mg/dL      Sodium 132 mmol/L      Potassium 4.5 mmol/L      Chloride 93 mmol/L      CO2 24.7 mmol/L      Calcium 9.6 mg/dL      Total Protein 6.9 g/dL      Albumin 4.0 g/dL      ALT (SGPT) 16 U/L      AST (SGOT) 29 U/L      Alkaline Phosphatase 101 U/L      Total Bilirubin 0.8 mg/dL      Globulin 2.9 gm/dL      A/G Ratio 1.4 g/dL      BUN/Creatinine Ratio 18.1     Anion Gap 14.3 mmol/L      eGFR 51.7 mL/min/1.73     Narrative:      GFR Normal >60  Chronic Kidney Disease <60  Kidney Failure <15    The GFR formula is only valid for adults with stable renal function between ages 18 and 70.    Magnesium [[id number]]  (Abnormal) Collected: 11/26/24 1732    Specimen: Blood Updated: 11/26/24 1824     Magnesium 1.3 mg/dL     CBC & Differential [[id number]]  (Abnormal) Collected: 11/26/24 1732    Specimen: Blood Updated: 11/26/24 1741    Narrative:      The following orders were created for panel order CBC &  Differential.  Procedure                               Abnormality         Status                     ---------                               -----------         ------                     CBC Auto Differential[[id number]]        Abnormal            Final result                 Please view results for these tests on the individual orders.    CBC Auto Differential [[id number]]  (Abnormal) Collected: 11/26/24 1732    Specimen: Blood Updated: 11/26/24 1743     WBC 6.39 10*3/mm3      RBC 3.65 10*6/mm3      Hemoglobin 11.3 g/dL      Hematocrit 33.8 %      MCV 92.6 fL      MCH 31.0 pg      MCHC 33.4 g/dL      RDW 14.2 %      RDW-SD 47.9 fl      MPV 7.7 fL      Platelets 190 10*3/mm3      Neutrophil % 68.2 %      Lymphocyte % 20.3 %      Monocyte % 10.6 %      Eosinophil % 0.2 %      Basophil % 0.2 %      Immature Grans % 0.5 %      Neutrophils, Absolute 4.36 10*3/mm3      Lymphocytes, Absolute 1.30 10*3/mm3      Monocytes, Absolute 0.68 10*3/mm3      Eosinophils, Absolute 0.01 10*3/mm3      Basophils, Absolute 0.01 10*3/mm3      Immature Grans, Absolute 0.03 10*3/mm3      nRBC 0.0 /100 WBC     Urinalysis With Microscopic If Indicated (No Culture) - Urine, Clean Catch [[id number]]  (Normal) Collected: 11/26/24 1726    Specimen: Urine, Clean Catch Updated: 11/26/24 1736     Color, UA Yellow     Appearance, UA Clear     pH, UA 5.5     Specific Gravity, UA 1.007     Glucose, UA Negative     Ketones, UA Negative     Bilirubin, UA Negative     Blood, UA Negative     Protein, UA Negative     Leuk Esterase, UA Negative     Nitrite, UA Negative     Urobilinogen, UA 1.0 E.U./dL    Narrative:      Urine microscopic not indicated.             Imaging Results (Last 24 Hours)       Procedure Component Value Units Date/Time    XR Hip With or Without Pelvis 2 - 3 View Left [[id number]] Collected: 11/26/24 1748     Updated: 11/26/24 1752    Narrative:      XR HIP W OR WO PELVIS 2-3 VIEW LEFT    Date of Exam: 11/26/2024 5:09 PM  EST    Indication: hip pain after fall    Comparison: None available.    Findings:  The proximal left femur is intact. The iliopectineal and ilioischial lines are intact bilaterally. Mild bilateral hip osteoarthritis. Multilevel disc narrowing in the visualized lower lumbar spine. Vascular calcifications noted.      Impression:      Impression:  Negative for acute osseous abnormality.        Electronically Signed: Damian Keen MD    11/26/2024 5:50 PM EST    Workstation ID: [id number]              Assessment & Plan        This is a 89 y.o. male with:    Active and Resolved Problems  Active Hospital Problems    Diagnosis  POA    **Weakness [R53.1]  Yes      Resolved Hospital Problems   No resolved problems to display.       Fall   Weakness  - X-ray of knee showed degenerative changes with no fractures and soft tissues swelling on the left  - X-ray of hip showed no acute abnormality  - fall precautions  - PT/OT consulted recommending inpatient rehab  - Pre-cert for [facility] rehab pending     Hypomagnesemia  - Magnesium: 1.3 on admit   - Mag sulfate given in the ED  - Repeat lab pending     Hyponatremia   - appears chronic   - Sodium: 132, 130 at baseline   -Monitor while admitted     Diabetes mellitus  -   - Hold metformin  - Continue Actos  - SSI ordered  - Diabetic diet     Anemia   - Hgb 10.8  - No overt s/sx of bleeding   - Continue to monitor CBC  - Transfuse if Hgb < 7    Atrial fibrillation  -Continue amiodarone, propranolol and Eliquis     Hypertension  - /81  - continue losartan    VTE Prophylaxis:  Pharmacologic & mechanical VTE prophylaxis orders are present.        The patient desires to be as follows:    CODE STATUS:    Code Status (Patient has no pulse and is not breathing): CPR (Attempt to Resuscitate)  Medical Interventions (Patient has pulse or is breathing): Full Support        Giuliana Roe, who can be contacted at [phone], is the designated person to make medical decisions on the patient's  "behalf if He is incapable of doing so. This was clarified with patient and/or next of kin on 2024 during the course of this H&P.    Admission Status:  I believe this patient meets inpatient status.    Expected Length of Stay: 1-2 days    PDMP and Medication Dispenses via Sidebar reviewed and consistent with patient reported medications.    I discussed the patient's findings and my recommendations with patient.      Signature:     This document has been electronically signed by DOMONIQUE Hernandez on 2024 17:09 EST   [facility] Hospitalist Team      Electronically signed by Shaji Loredo MD at 24 0703       Jayjay Ross PA-C at 24 1531       Attestation signed by Chuy Rick MD at 24 1226    PA documentation reviewed                [facility] H&P    Patient Name: Ap Roe  : 1/15/1928  MRN: [id number]  Primary Care Physician: Elva Mota MD  Date of admission: 2024     Patient Care Team:  Elva Mota MD as PCP - General (Internal Medicine)          Subjective  History Present Illness     Chief Complaint:   Chief Complaint   Patient presents with    Fall         History of Present Illness  Obtained from ED provider HPI on 2024:  Patient is a 89-year-old male presented via EMS from home after mechanical fall patient states he tripped going up the steps in his garage landing on his knees.  He has some abrasions noted on his knees bilaterally does report some pain in this region.  He denies pain elsewhere.  He denies any head injury or loss of consciousness.  He denies any lightheadedness or dizziness to cause the fall.  Denies any numbness or weakness of his legs.     24:  Patient and family with him confirms the HPI noted above reporting a fall the previous day without head trauma or loss of consciousness.  Patient reports that he \"missed a step\" but family do note that he has been having some increased fatigue.  He " denies any pain, dyspnea or other symptoms at the time of my exam.        ROS  Review of Systems   Constitutional: Negative.   HENT: Negative.     Eyes: Negative.    Cardiovascular: Negative.    Respiratory: Negative.     Skin: Negative.    Musculoskeletal:  Positive for falls and joint pain.   Gastrointestinal: Negative.    Genitourinary: Negative.    Neurological: Negative.    Psychiatric/Behavioral: Negative.           Personal History     Past Medical History:   Past Medical History:   Diagnosis Date    Arthritis     Diabetes 1.5, managed as type 2     Heart disease        Surgical History:      Past Surgical History:   Procedure Laterality Date    CHOLECYSTECTOMY      HAND SURGERY      MELANOMA WIDE LOCAL EXCISION Right     cheek    PACEMAKER IMPLANTATION      SKIN GRAFT      scalp           Family History: family history is not on file. Otherwise pertinent FHx was reviewed and unremarkable.     Social History:  reports that he has never smoked. He has never used smokeless tobacco. He reports that he does not drink alcohol and does not use drugs.      Medications:  Prior to Admission medications    Medication Sig Start Date End Date Taking? Authorizing Provider   amiodarone (PACERONE) 200 MG tablet Take 1 tablet by mouth Daily. 10/9/24  Yes Ayse Hawkins MD   Eliquis 5 MG tablet tablet Take 1 tablet by mouth Every 12 (Twelve) Hours. 10/5/24  Yes Ayse Hawkins MD   furosemide (LASIX) 20 MG tablet Take 1 tablet by mouth Daily.   Yes Ayse Hawkins MD   losartan (COZAAR) 100 MG tablet Take 1 tablet by mouth Daily. 10/4/24  Yes Ayse Hawkins MD   metFORMIN (GLUCOPHAGE) 1000 MG tablet Take 1 tablet by mouth 2 (Two) Times a Day With Meals.   Yes Ayse Hawkins MD   pioglitazone (ACTOS) 30 MG tablet Take 1 tablet by mouth Daily. 8/29/24  Yes Ayse Hawkins MD   propranolol (INDERAL) 80 MG tablet Take 1 tablet by mouth Daily. 10/8/24  Yes Ayse Hawkins MD    simvastatin (ZOCOR) 20 MG tablet Take 1 tablet by mouth Daily. 10/4/24  Yes Provider, MD Ayse   tamsulosin (FLOMAX) 0.4 MG capsule 24 hr capsule Take 1 capsule by mouth Daily. 10/4/24  Yes Provider, Ayse, MD       Allergies:    Allergies   Allergen Reactions    Celebrex [Celecoxib] Hives       Objective  Objective     Vital Signs  Temp:  [97.5 °F (36.4 °C)-97.8 °F (36.6 °C)] 97.8 °F (36.6 °C)  Heart Rate:  [58-65] 60  Resp:  [17-18] 17  BP: (120-166)/(65-99) 166/81  SpO2:  [95 %-99 %] 98 %  on   ;   Device (Oxygen Therapy): room air  Body mass index is 27.65 kg/m².    Physical Exam  Vitals reviewed.   Constitutional:       General: He is not in acute distress.     Appearance: Normal appearance. He is normal weight. He is not ill-appearing, toxic-appearing or diaphoretic.   HENT:      Head: Normocephalic.      Right Ear: External ear normal.  Patient is very hard of hearing     Left Ear: External ear normal.      Nose: Nose normal.      Mouth/Throat:      Mouth: Mucous membranes are moist.   Eyes:      Extraocular Movements: Extraocular movements intact.   Cardiovascular:      Rate and Rhythm: Normal rate and regular rhythm.      Pulses: Normal pulses.      Heart sounds: Normal heart sounds.   Pulmonary:      Effort: Pulmonary effort is normal.      Breath sounds: Normal breath sounds.   Abdominal:      General: Bowel sounds are normal.      Palpations: Abdomen is soft.      Tenderness: There is no abdominal tenderness.   Musculoskeletal:         General: Normal range of motion.      Cervical back: Normal range of motion.      Right lower leg: No edema.      Left lower leg: No edema.   Skin:     General: Skin is warm and dry.      Capillary Refill: Capillary refill takes less than 2 seconds.      Findings: Lesion present.   Neurological:      General: No focal deficit present.      Mental Status: He is alert and oriented to person, place, and time.   Psychiatric:         Mood and Affect: Mood normal.          Behavior: Behavior normal.         Thought Content: Thought content normal.         Judgment: Judgment normal.     Results Review:  I have personally reviewed most recent lab results and radiology images and interpretations and agree with findings, most notably: BMP, CBC, magnesium and x-ray of knees and hip.    Results from last 7 days   Lab Units 11/27/24  0336   WBC 10*3/mm3 5.94   HEMOGLOBIN g/dL 10.8*   HEMATOCRIT % 32.6*   PLATELETS 10*3/mm3 183     Results from last 7 days   Lab Units 11/27/24  0336   SODIUM mmol/L 130*   POTASSIUM mmol/L 3.6   CHLORIDE mmol/L 92*   CO2 mmol/L 26.6   BUN mg/dL 21   CREATININE mg/dL 1.26   GLUCOSE mg/dL 173*   CALCIUM mg/dL 9.5   ALK PHOS U/L 85   ALT (SGPT) U/L 16   AST (SGOT) U/L 29     Estimated Creatinine Clearance: 35.9 mL/min (by C-G formula based on SCr of 1.26 mg/dL).  Brief Urine Lab Results  (Last result in the past 365 days)        Color   Clarity   Blood   Leuk Est   Nitrite   Protein   CREAT   Urine HCG        11/26/24 1726 Yellow   Clear   Negative   Negative   Negative   Negative                   Microbiology Results (last 10 days)       ** No results found for the last 240 hours. **            ECG/EMG Results (most recent)       Procedure Component Value Units Date/Time    Telemetry Scan [[id number]] Resulted: 11/26/24     Updated: 11/27/24 0324    Telemetry Scan [[id number]] Resulted: 11/26/24     Updated: 11/27/24 0325                    XR Hip With or Without Pelvis 2 - 3 View Left    Result Date: 11/26/2024  Impression: Negative for acute osseous abnormality. Electronically Signed: Damian Keen MD  11/26/2024 5:50 PM EST  Workstation ID: [id number]    XR Knee 1 or 2 View Bilateral    Result Date: 11/26/2024  Impression: 1.Degenerative changes in both knees. 2.No fractures are identified. 3.Soft tissue swelling laterally on the left. Electronically Signed: Jose Cassidy MD  11/26/2024 4:18 PM EST  Workstation ID: [id number]       Estimated Creatinine  Clearance: 35.9 mL/min (by C-G formula based on SCr of 1.26 mg/dL).    Assessment & Plan  Assessment/Plan       Active Hospital Problems    Diagnosis  POA    **Weakness [R53.1]  Yes      Resolved Hospital Problems   No resolved problems to display.       Weakness with fall  -Sodium: 132 addressed as below  -UA unremarkable  -X-ray of knee showed degenerative changes with no fractures and soft tissues swelling on the left  -X-ray of hip showed no acute abnormality  -PT/OT consulted recommending inpatient rehab  -Case management consulted  -UR recommends inpatient admission     Hypomagnesemia  -Magnesium: 1.3 on presentation  -Mag sulfate given in the ED     Hyponatremia (chronic)  -Sodium: 132, 130 (generally at baseline  -Monitor while admitted     Diabetes mellitus  -Moderately controlled         Lab Results   Component Value Date     GLUCOSE 173 (H) 11/27/2024     GLUCOSE 148 (H) 11/26/2024     GLUCOSE 274 (H) 06/24/2024     GLUCOSE 358 (H) 01/18/2023   -Hold metformin  -Continue Actos  -Correctional insulin ordered  -Diabetic diet  -Monitor before meals and at bedtime    Anemia  Lab Results   Component Value Date    HGB 10.8 (L) 11/27/2024    MCV 91.3 11/27/2024    MCHC 33.1 11/27/2024   -Monitor while admitted     Atrial fibrillation  -Continue amiodarone, propranolol and Eliquis     Hypertension  -Poorly controlled       BP Readings from Last 1 Encounters:   11/27/24 166/81   - Continue losartan and Lasix  - Monitor while admitted     Hyperlipidemia  -Statin     BPH  -Flomax          VTE Prophylaxis - Active VTE Prophylaxis  Pharmacologic:        Start     Dose Route Frequency Stop    11/27/24 0900  apixaban (ELIQUIS) tablet 5 mg         5 mg PO Every 12 Hours Scheduled --                  Mechanical:        Start        11/26/24 1906  Maintain Sequential Compression Device  Continuous                              CODE STATUS:    Code Status and Medical Interventions: CPR (Attempt to Resuscitate); Full Support    Ordered at: 24 1906     Code Status (Patient has no pulse and is not breathing):    CPR (Attempt to Resuscitate)     Medical Interventions (Patient has pulse or is breathing):    Full Support       This patient has been examined wearing personal protective equipment.     I discussed the patient's findings and my recommendations with patient and nursing staff.      Signature:Electronically signed by Jayjay Ross PA-C, 24, 3:33 PM EST.                Electronically signed by Chuy Rick MD at 24 1226          Physician Progress Notes (last 24 hours)        Darrell Perez MD at 24 1513              PROGRESS NOTE      Patient Name: Ap Roe  : 1/15/1928  MRN: [id number]  Primary Care Physician: Elva Mota MD  Date of admission: 2024    Patient Care Team:  Elva Mota MD as PCP - General (Internal Medicine)        Subjective   Subjective:     Patient seen and examined   NAD noted  Creatinine essentially unchanged    Review of systems:  ROS negative      Allergies:    Allergies   Allergen Reactions    Celebrex [Celecoxib] Hives       Objective   Exam:     Vital Signs  Temp:  [97.4 °F (36.3 °C)-98 °F (36.7 °C)] 97.5 °F (36.4 °C)  Heart Rate:  [60-73] 66  Resp:  [13-20] 16  BP: (107-131)/(43-62) 109/48  SpO2:  [94 %-98 %] 98 %  on   ;   Device (Oxygen Therapy): room air  Body mass index is 27.96 kg/m².    General:   male in no acute distress.    Head:      Normocephalic and atraumatic.    Eyes:      PERRL/EOM intact, conjunctivae and sclerae clear without nystagmus.    Neck:      No masses, thyromegaly,  trachea central with normal respiratory effort   Lungs:    Clear bilaterally to auscultation.    Heart:      Regular rate and rhythm, no murmur no gallop  Abd:        Soft, nontender, not distended, bowel sounds positive, no shifting dullness   Pulses:   Pulses palpable  Extr:        No cyanosis or clubbing--+edema.    Neuro:    No focal deficits.   alert  oriented x3  Skin:       Intact without lesions or rashes.    Psych:    Alert and cooperative; normal mood and affect; .      Results Review:  I have personally reviewed most recent Data :  CBC    Results from last 7 days   Lab Units 12/05/24  1014 12/04/24  0301 11/30/24  0832   WBC 10*3/mm3 6.03 5.67 5.77   HEMOGLOBIN g/dL 10.6* 10.6* 9.9*   PLATELETS 10*3/mm3 188 184 164     CMP   Results from last 7 days   Lab Units 12/06/24  0735 12/05/24  0943 12/04/24  0301 12/02/24  1717 11/30/24  0832   SODIUM mmol/L 127* 132* 128* 123* 130*   POTASSIUM mmol/L 4.3 4.0 4.8 5.3* 3.9   CHLORIDE mmol/L 94* 105 95* 95* 97*   CO2 mmol/L 22.5 19.1* 22.1 19.2* 22.3   BUN mg/dL 32* 27* 34* 33* 25*   CREATININE mg/dL 1.46* 1.09 1.46* 1.54* 1.18   GLUCOSE mg/dL 169* 181* 175* 276* 141*   ALBUMIN g/dL  --  2.6*  --  3.4* 3.3*   BILIRUBIN mg/dL  --  0.5  --  0.5 0.6   ALK PHOS U/L  --  71  --  102 75   AST (SGOT) U/L  --  35  --  34 27   ALT (SGPT) U/L  --  13  --  16 14     ABG      No radiology results for the last day    Results for orders placed during the hospital encounter of 11/26/24    Adult Transthoracic Echo Complete W/ Cont if Necessary Per Protocol    Interpretation Summary    Left ventricular systolic function is low normal. Left ventricular ejection fraction appears to be 51 - 55%.    Left ventricular diastolic function is consistent with (grade Ia w/high LAP) impaired relaxation.    The left atrial cavity is moderately dilated.    Left atrial volume is moderately increased.    There is moderate calcification of the aortic valve.    Estimated right ventricular systolic pressure from tricuspid regurgitation is normal (<35 mmHg).    Scheduled Meds:amiodarone, 200 mg, Oral, Daily  apixaban, 5 mg, Oral, Q12H  atorvastatin, 10 mg, Oral, Daily  [Held by provider] furosemide, 20 mg, Oral, Daily  insulin lispro, 2-7 Units, Subcutaneous, 4x Daily AC & at Bedtime  losartan, 25 mg, Oral, Daily  pioglitazone, 30 mg, Oral,  Daily  propranolol, 80 mg, Oral, Daily  sodium chloride, 10 mL, Intravenous, Q12H  tamsulosin, 0.4 mg, Oral, Daily      Continuous Infusions:   PRN Meds:  senna-docusate sodium **AND** polyethylene glycol **AND** bisacodyl **AND** bisacodyl    dextrose    dextrose    glucagon (human recombinant)    Magnesium Standard Dose Replacement - Follow Nurse / BPA Driven Protocol    melatonin    [COMPLETED] Insert Peripheral IV **AND** sodium chloride    sodium chloride    sodium chloride    Assessment & Plan   Assessment and Plan:         Weakness    ASSESSMENT:  Acute kidney injury: Likely related to angiotensin receptor blocker and may be some cardiorenal syndrome specially with the presence of diastolic dysfunctions  Congestive heart failure with grade 1 diastolic dysfunction  History of hypertension  History of diabetes mellitus  Atrial fibrillation    PLAN :     Patient with acute kidney injury which is improving at this time  Significant edema has improved respiratory condition is stable renal functions are better on diuretics  Hyperkalemia resolved sodium level  Potassium level is getting better  Sodium level again dropped to 127 will give another dose of urea today  Patient peripheral edema has improved  Patient is still very lethargic but as per patient daughter he to get better at times  Continue Lasix 20 mg a day will increase to 20 mg twice daily if needed  Remaining electrolytes and acid-base acceptable  Manage blood sugar aggressively that also contributing to some hyponatremia   Blood pressure stable o will decrease losartan to 25 mg a day  Daily labs   We will continue to follow        Electronically signed by   Darrell Perez MD.   [facility] kidney consultant  [phone]  2024  15:13 EST       Electronically signed by Darrell Perez MD at 24 1515       Twyla Johnson MD at 24 1158              [facility] MEDICINE SERVICE  DAILY PROGRESS NOTE    NAME: Ap Roe  :  1/15/1928  MRN: [id number]      LOS: 9 days     PROVIDER OF SERVICE: Twyla Johnson MD    Chief Complaint: Weakness    Subjective:     Interval History:  History taken from: patient    Patient is otherwise feeling well this morning.  Daughter is present at bedside.  No acute overnight events noted.  Noted laboratory studies.  Creatinine elevated.  Sodium decreased.        Review of Systems:   Review of Systems   Constitutional: Negative.    Respiratory: Negative.     Cardiovascular: Negative.    Gastrointestinal: Negative.    Musculoskeletal: Negative.    Neurological: Negative.    Psychiatric/Behavioral: Negative.         Objective:     Vital Signs  Temp:  [97.4 °F (36.3 °C)-98 °F (36.7 °C)] 97.8 °F (36.6 °C)  Heart Rate:  [60-73] 60  Resp:  [13-20] 18  BP: (107-131)/(43-62) 107/43   Body mass index is 27.96 kg/m².    Physical Exam  Physical Exam  Constitutional:       General: He is awake.      Appearance: Normal appearance. He is well-developed and well-groomed.   HENT:      Head: Normocephalic and atraumatic.      Nose: Nose normal.      Mouth/Throat:      Mouth: Mucous membranes are moist.      Pharynx: Oropharynx is clear.   Eyes:      Extraocular Movements: Extraocular movements intact.      Conjunctiva/sclera: Conjunctivae normal.      Pupils: Pupils are equal, round, and reactive to light.   Cardiovascular:      Rate and Rhythm: Normal rate and regular rhythm.      Pulses: Normal pulses.      Heart sounds: Normal heart sounds.   Pulmonary:      Effort: Pulmonary effort is normal.      Breath sounds: Normal breath sounds.   Abdominal:      General: Abdomen is flat. Bowel sounds are normal.      Palpations: Abdomen is soft.   Musculoskeletal:         General: Normal range of motion.      Cervical back: Normal range of motion and neck supple.      Right lower leg: Edema present.      Left lower leg: Edema present.   Skin:     General: Skin is warm and dry.   Neurological:      General: No focal deficit  present.      Mental Status: He is alert and oriented to person, place, and time. Mental status is at baseline.      Cranial Nerves: Cranial nerves 2-12 are intact.      Sensory: Sensation is intact.      Motor: Motor function is intact.   Psychiatric:         Mood and Affect: Mood normal.         Behavior: Behavior normal. Behavior is cooperative.         Thought Content: Thought content normal.         Judgment: Judgment normal.            Diagnostic Data    Results from last 7 days   Lab Units 12/06/24  0735 12/05/24  1014 12/05/24  0943   WBC 10*3/mm3  --  6.03  --    HEMOGLOBIN g/dL  --  10.6*  --    HEMATOCRIT %  --  30.9*  --    PLATELETS 10*3/mm3  --  188  --    GLUCOSE mg/dL 169*  --  181*   CREATININE mg/dL 1.46*  --  1.09   BUN mg/dL 32*  --  27*   SODIUM mmol/L 127*  --  132*   POTASSIUM mmol/L 4.3  --  4.0   AST (SGOT) U/L  --   --  35   ALT (SGPT) U/L  --   --  13   ALK PHOS U/L  --   --  71   BILIRUBIN mg/dL  --   --  0.5   ANION GAP mmol/L 10.5  --  7.9       No radiology results for the last day      I reviewed the patient's new clinical results.    Assessment/Plan:     Active and Resolved Problems  Active Hospital Problems    Diagnosis  POA    **Weakness [R53.1]  Yes      Resolved Hospital Problems   No resolved problems to display.     Status post fall at home  Generalized weakness  Hypomagnesemia   Diabetes mellitus  Anemia of chronic disease  Atrial fibrillation  Hypertension  Acute confusional state  Hyponatremia, chronic--with worsening this hospital admission  Acute kidney injury    No labs done this morning. Yesterday's labs have been reviewed. Nephrology continues to follow.     Noted decreased blood pressure this morning. Albumin orders given as per nephrology--yesterday.  Further albumin orders as per nephrology recommendations.  Noted elevated creatinine as well as sodium level this morning.  Defer further management as per renal.    Patient is otherwise comfortable and sitting up in his  chair this morning. He no longer endorses any confusion. He is alert and orient x 4. There is a strong likelihood the patient will benefit significantly from acute inpatient rehab secondary to excellent functional status that he had prior to being admitted. Patient was independent at home. He was driving and manages his own medications as well as finances on his own independently.     Given his prolonged hospital course and daily deconditioning I would strongly advised patient to be discharged to an acute inpatient rehab facility. Patient is currently being held during the appeal process to an acute inpatient rehab facility. Noted therapy evaluation have also made similar recommendations.         VTE Prophylaxis:  Pharmacologic VTE prophylaxis orders are present.             Disposition Planning:     Barriers to Discharge: Insurance approval, appeal for discharge to acute rehab facility, placement to subacute possibly.  Anticipated Date of Discharge: 12/9/2024  Place of Discharge: Subacute versus acute rehab pending insurance approval and/or appeal process.      Time: 25 minutes     Code Status and Medical Interventions: No CPR (Do Not Attempt to Resuscitate); Full Support   Ordered at: 11/28/24 1024     Level Of Support Discussed With:    Next of Kin (If No Surrogate)     Code Status (Patient has no pulse and is not breathing):    No CPR (Do Not Attempt to Resuscitate)     Medical Interventions (Patient has pulse or is breathing):    Full Support       Signature: Electronically signed by Twyla Johnsno MD, 12/06/24, 11:58 [facility].  [facility]      Electronically signed by Twyla Johnson MD at 12/06/24 1209          Physical Therapy Notes (last 24 hours)        Sarah Lima, PT at 12/06/24 0853  Version 1 of 1         Subjective: Pt agreeable to therapeutic plan of care.  Patient cleared for therapy by nursing.  Patient up in chair, is pleasant and agreeable    Objective:     Precautions -  "falls    Bed mobility - found up in chair  Transfers - CGA-Stanton to CTS, Stanton for eccentric control with return to sit  Ambulation - 45 and x40 feet CGA and with rolling walker cues for posture and pacing    Sit<>stand x5 reps for strengthening and working on carryover for hand placement with RW; requires prompts for each rep.  Cues and physical assist for eccentric control.  Static standing x15-20 reps with each bout using RW and requiring CGA for standing balance.    Therapeutic Exercise -seated: AP, LAQ standing: MIP, heel raises, toe raises x10 reps each     Vitals:   129/59mmHG 71bpm, 97%    Pain: 0 VAS   L  Education: Provided education on the importance of mobility in the acute care setting, Transfer Training, and Gait Training    Assessment: Ap Roe presents with functional mobility impairments which indicate the need for skilled intervention.  Patient fatigues quickly and but vitals stable throughout.  Patient requires CGA-Stanton for balance and is progressing slowly with balance and functional mobility.  His home set-up, falls risk,  and current reliance on RW are barriers to return home at this time.  Recommend SNF level of care as the patient would benefit from reduced intensity or rehabilitation for increased duration-- continue to anticipate eventual safe return to a home setting once LE strength and balance have improved.  Tolerating session today without incident. Will continue to follow and progress as tolerated.     Plan/Recommendations:   If medically appropriate, Moderate Intensity Therapy recommended post-acute care. This is recommended as therapy feels the patient would require 3-4 days per week and wouldn't tolerate \"3 hour daily\" rehab intensity. SNF would be the preferred choice. If the patient does not agree to SNF, arrange HH or OP depending on home bound status. If patient is medically complex, consider LTACH. Pt requires no DME at discharge.     Pt desires Inpatient Rehabilitation " placement at discharge. Pt cooperative; agreeable to therapeutic recommendations and plan of care.         Basic Mobility 6-click:  Rollin = Total, A lot = 2, A little = 3; 4 = None  Supine>Sit:   1 = Total, A lot = 2, A little = 3; 4 = None   Sit>Stand with arms:  1 = Total, A lot = 2, A little = 3; 4 = None  Bed>Chair:   1 = Total, A lot = 2, A little = 3; 4 = None  Ambulate in room:  1 = Total, A lot = 2, A little = 3; 4 = None  3-5 Steps with railin = Total, A lot = 2, A little = 3; 4 = None  Score: 17    Modified Abigail: N/A = No pre-op stroke/TIA    Post-Tx Position: Up in Chair, Alarms activated, and Call light and personal items within reach  PPE: gloves           Electronically signed by Sarah Lima, PT at 24 1241       Sarah Lima, PT at 24 124  Version 1 of 1         Goal Outcome Evaluation:         Assessment: Ap Roe presents with functional mobility impairments which indicate the need for skilled intervention.  Patient fatigues quickly and but vitals stable throughout.  Patient requires CGA-Stanton for balance and is progressing slowly with balance and functional mobility.  His home set-up, falls risk,  and current reliance on RW are barriers to return home at this time.  Recommend SNF level of care as the patient would benefit from reduced intensity or rehabilitation for increased duration-- continue to anticipate eventual safe return to a home setting once LE strength and balance have improved.  Tolerating session today without incident. Will continue to follow and progress as tolerated.           Anticipated Discharge Disposition (PT): skilled nursing facility       Electronically signed by Sarah Lima, PT at 24 1243     Patient Name: Ap Roe                       : 1/15/1928                        MRN: [id number]                              Today's Date: 2024                                 Admit Date: 2024                       Visit Dx:   Visit Diagnosis       ICD-10-CM ICD-9-CM   1. Weakness  R53.1 780.79   2. Fall, initial encounter  W19.XXXA E888.9   3. Abrasion of knee, bilateral  S80.211A 916.0     S80.212A     4. Acute pain of both knees  M25.561 338.19     M25.562 719.46   5. Left hip pain  M25.552 719.45         Problem List       Patient Active Problem List   Diagnosis    Weakness         Medical History        Past Medical History:   Diagnosis Date    Arthritis      Diabetes 1.5, managed as type 2      Heart disease           Surgical History         Past Surgical History:   Procedure Laterality Date    CHOLECYSTECTOMY        HAND SURGERY        MELANOMA WIDE LOCAL EXCISION Right       cheek    PACEMAKER IMPLANTATION        SKIN GRAFT         scalp           General Information         Row Name 11/27/24 1130                 Physical Therapy Time and Intention     Document Type evaluation  -OD       Mode of Treatment physical therapy  -OD          Row Name 11/27/24 1130                 General Information     Patient Profile Reviewed yes  -OD       Prior Level of Function independent:;ADL's;driving;all household mobility  -OD       Existing Precautions/Restrictions fall  -OD       Barriers to Rehab hearing deficit;cognitive status  -OD          Row Name 11/27/24 1130                 Living Environment     People in Home spouse  -OD          Row Name 11/27/24 1130                 Home Main Entrance     Number of Stairs, Main Entrance two  -OD          Row Name 11/27/24 1130                 Stairs Within Home, Primary     Number of Stairs, Within Home, Primary none  -OD          Row Name 11/27/24 1130                 Cognition     Orientation Status (Cognition) oriented to;person;disoriented to;place;situation;time  -OD          Row Name 11/27/24 1130                 Safety Issues/Impairments Affecting Functional Mobility     Safety Issues Affecting Function (Mobility) judgment;problem-solving;sequencing abilities  -OD        Impairments Affecting Function (Mobility) balance;cognition;endurance/activity tolerance;motor planning;strength  -OD       Cognitive Impairments, Mobility Safety/Performance insight into deficits/self-awareness;judgment;problem-solving/reasoning;sequencing abilities  -OD                       User Key  (r) = Recorded By, (t) = Taken By, (c) = Cosigned By        Initials Name Provider Type     Brandy Macedo PT Physical Therapist                            Mobility         Row Name 11/27/24 1133                 Bed Mobility     Bed Mobility bed mobility (all) activities  -OD       All Activities, [place] (Bed Mobility) minimum assist (75% patient effort)  -OD          Row Name 11/27/24 1133                 Bed-Chair Transfer     Bed-Chair [place] (Transfers) moderate assist (50% patient effort);2 person assist  -OD          Row Name 11/27/24 1133                 Sit-Stand Transfer     Sit-Stand [place] (Transfers) minimum assist (75% patient effort);2 person assist  -OD          Row Name 11/27/24 1133                 Gait/Stairs (Locomotion)     [place] Level (Gait) moderate assist (50% patient effort);2 person assist  -OD       Patient was able to Ambulate yes  -OD       Distance in Feet (Gait) 2  only safe for steps during transfers  -OD       Deviations/Abnormal Patterns (Gait) festinating/shuffling  -OD                       User Key  (r) = Recorded By, (t) = Taken By, (c) = Cosigned By        Initials Name Provider Type     Brandy Macedo PT Physical Therapist                            Obj/Interventions         Row Name 11/27/24 1134                 Range of Motion Comprehensive     General Range of Motion bilateral lower extremity ROM WFL  -OD          Row Name 11/27/24 1134                 Strength Comprehensive (MMT)     General Manual Muscle Testing (MMT) Assessment lower extremity strength deficits identified  -OD       Comment, General Manual Muscle Testing (MMT)  Assessment BLE grossly weak 3/5. Observed poor motor control and motor planning. Could be r/t [place]  -OD          Row Name 11/27/24 1134                 Balance     Balance Assessment standing static balance;standing dynamic balance  -OD       Static Standing Balance moderate assist;2-person assist  -OD       Dynamic Standing Balance moderate assist;2-person assist  -OD       Balance Interventions sitting;minimal challenge;standing;moderate challenge  -OD          Row Name 11/27/24 1134                 Sensory Assessment (Somatosensory)     Sensory Assessment (Somatosensory) sensation intact  -OD                       User Key  (r) = Recorded By, (t) = Taken By, (c) = Cosigned By        Initials Name Provider Type     OD Brandy Vallecillo, PT Physical Therapist                            Goals/Plan         Row Name 11/27/24 1152                 Bed Mobility Goal 1 (PT)     Activity/Assistive Device (Bed Mobility Goal 1, PT) bed mobility activities, all  -OD       [place] Level/Cues Needed (Bed Mobility Goal 1, PT) independent  -OD       Time Frame (Bed Mobility Goal 1, PT) long term goal (LTG);2 weeks  -OD          Row Name 11/27/24 1152                 Transfer Goal 1 (PT)     Activity/Assistive Device (Transfer Goal 1, PT) transfers, all  -OD       [place] Level/Cues Needed (Transfer Goal 1, PT) independent  -OD       Time Frame (Transfer Goal 1, PT) long term goal (LTG);2 weeks  -OD          Row Name 11/27/24 1152                 Gait Training Goal 1 (PT)     Activity/Assistive Device (Gait Training Goal 1, PT) gait (walking locomotion);walker, rolling  -OD       [place] Level (Gait Training Goal 1, PT) modified independence  -OD       Distance (Gait Training Goal 1, PT) 40'  -OD       Time Frame (Gait Training Goal 1, PT) long term goal (LTG);2 weeks  -OD          Row Name 11/27/24 1152                 Stairs Goal 1 (PT)     Activity/Assistive Device (Stairs Goal 1, PT) stairs, all skills  -OD        [place] Level/Cues Needed (Stairs Goal 1, PT) independent  -OD       Number of Stairs (Stairs Goal 1, PT) 2  -OD       Time Frame (Stairs Goal 1, PT) long term goal (LTG);2 weeks  -OD          Row Name 11/27/24 1150                 Therapy Assessment/Plan (PT)     Planned Therapy Interventions (PT) balance training;bed mobility training;gait training;home exercise program;neuromuscular re-education;ROM (range of motion);stair training;strengthening;stretching;patient/family education;transfer training;postural re-education  -OD                    User Key  (r) = Recorded By, (t) = Taken By, (c) = Cosigned By        Initials Name Provider Type     OD Brandy Vallecillo, PT Physical Therapist                         Clinical Impression         Row Name 11/27/24 1138                 Pain     Pretreatment Pain Rating 0/10 - no pain  -OD       Posttreatment Pain Rating 0/10 - no pain  -OD          Row Name 11/27/24 1131                 Plan of Care Review     Plan of Care Reviewed With patient;spouse;child  -OD       Progress no change  -OD       Outcome Evaluation Ap Roe is a 89 y/o M who was admitted to [facility] on 11/26/24 after falling on his knees at home. Imaging hip and knee (-). Pt is AAOx1 and [place] this date, able to report living in a SSH with spouse and is IND with ADLs and mobility using cane. However pt is disoriented to situation, does not remember falling. Spouse present to help obtain PLOF. Spouse reports they live in a SSH with 2 ISMAEL. Pt is current with Outpatient PT for weakness and balance. This date, pt demonstrates global BLE weakness, impaired sequencing and motor planning, and observed resting bilat hand tremor. Pt requires modAx2 and short, specific commands to achieve transferring bed>BSC and BSC>recliner. Pt appears far below baseline function and would benefit from IP rehab upon d/c to facilitate return to PLOF. PT will follow while admitted.  -OD          Row Name 11/27/24 1134                  Therapy Assessment/Plan (PT)     Rehab Potential (PT) good  -OD       Criteria for Skilled Interventions Met (PT) yes;meets criteria  -OD       Therapy Frequency (PT) 5 times/wk  -OD       Predicted Duration of Therapy Intervention (PT) until d/c  -OD          Row Name 11/27/24 1138                 Vital Signs     O2 Delivery Pre Treatment room air  -OD       O2 Delivery Intra Treatment room air  -OD       O2 Delivery Post Treatment room air  -OD       Pre Patient Position Supine  -OD       Intra Patient Position Standing  -OD       Post Patient Position Sitting  -OD          Row Name 11/27/24 1138                 Positioning and Restraints     Pre-Treatment Position in bed  -OD       Post Treatment Position chair  -OD       In Chair notified nsg;reclined;call light within reach;encouraged to call for assist;exit alarm on;with family/caregiver  -OD                       User Key  (r) = Recorded By, (t) = Taken By, (c) = Cosigned By        Initials Name Provider Type     OD Brandy Vallecillo, PT Physical Therapist                            Outcome Measures         Row Name 11/27/24 1153 11/27/24 0820            How much help from another person do you currently need...     Turning from your back to your side while in flat bed without using bedrails? 3  -OD 4  -RR     Moving from lying on back to sitting on the side of a flat bed without bedrails? 3  -OD 4  -RR     Moving to and from a bed to a chair (including a wheelchair)? 2  -OD 3  -RR     Standing up from a chair using your arms (e.g., wheelchair, bedside chair)? 2  -OD 2  -RR     Climbing 3-5 steps with a railing? 1  -OD 2  -RR     To walk in hospital room? 2  -OD 2  -RR     AM-PAC 6 Clicks Score (PT) 13  -OD 17  -RR     Highest Level of Mobility Goal 4 --> Transfer to chair/commode  -OD 5 --> Static standing  -RR        Row Name 11/27/24 1153                 Functional Assessment     Outcome Measure Options AM-PAC 6 Clicks Basic Mobility (PT)  -OD                        User Key  (r) = Recorded By, (t) = Taken By, (c) = Cosigned By        Initials Name Provider Type     OD Brandy Vallecillo, ALVA Physical Therapist     Vonnie Faria RN Registered Nurse                          Physical Therapy Education            Title: PT OT SLP Therapies (Done)         Topic: Physical Therapy (Done)         Point: Mobility training (Done)         Learning Progress Summary             Patient Acceptance, E, VU by OD at 11/27/2024 1153                            Point: Home exercise program (Done)         Learning Progress Summary             Patient Acceptance, E, VU by OD at 11/27/2024 1153                            Point: Body mechanics (Done)         Learning Progress Summary             Patient Acceptance, E, VU by OD at 11/27/2024 1153                            Point: Precautions (Done)         Learning Progress Summary             Patient Acceptance, E, VU by OD at 11/27/2024 1153                                                User Key         Initials Effective Dates Name Provider Type Discipline     OD 05/11/23 -  Brandy Vallecillo PT Physical Therapist PT                          PT Recommendation and Plan  Planned Therapy Interventions (PT): balance training, bed mobility training, gait training, home exercise program, neuromuscular re-education, ROM (range of motion), stair training, strengthening, stretching, patient/family education, transfer training, postural re-education  Progress: no change  Outcome Evaluation: Ap Roe is a 89 y/o M who was admitted to [facility] on 11/26/24 after falling on his knees at home. Imaging hip and knee (-). Pt is AAOx1 and [place] this date, able to report living in a [facility] with spouse and is IND with ADLs and mobility using cane. However pt is disoriented to situation, does not remember falling. Spouse present to help obtain PLOF. Spouse reports they live in a [facility] with 2 ISMAEL. Pt is current with Outpatient PT for weakness and balance. This date, pt  demonstrates global BLE weakness, impaired sequencing and motor planning, and observed resting bilat hand tremor. Pt requires modAx2 and short, specific commands to achieve transferring bed>BSC and BSC>recliner. Pt appears far below baseline function and would benefit from IP rehab upon d/c to facilitate return to PLOF. PT will follow while admitted.      Time Calculation:        PT Charges         Row Name 11/27/24 1154                       Time Calculation     Start Time 0807  -OD         Stop Time 0831  -OD         Time Calculation (min) 24 min  -OD         PT Received On 11/27/24  -OD         PT - Next Appointment 11/29/24  -OD         PT Goal Re-Cert Due Date 12/11/24  -OD                 Time Calculation- PT     Total Timed Code Minutes- PT 0 minute(s)  -OD                      User Key  (r) = Recorded By, (t) = Taken By, (c) = Cosigned By        Initials Name Provider Type     OD Brandy Vallecillo, PT Physical Therapist                       Therapy Charges for Today         Code Description Service Date Service Provider Modifiers Qty     11662161994 HC PT EVAL MOD COMPLEXITY 4 11/27/2024 Brandy Vallecillo, PT GP 1                PT G-Codes  Outcome Measure Options: AM-PAC 6 Clicks Basic Mobility (PT)  AM-PAC 6 Clicks Score (PT): 13  PT Discharge Summary  Anticipated Discharge Disposition (PT): inpatient rehabilitation facility     Brandy Vallecillo PT                   11/27/2024        Occupational Therapy Notes (last 24 hours)        Esperanza Cason, OT at 12/06/24 1511          Assessment: Ap Roe presents with ADL impairments affecting function including balance, endurance / activity tolerance, postural / trunk control, range of motion (ROM), and strength. Demonstrated functioning below baseline abilities indicate the need for continued skilled intervention while inpatient. Pt pleasant and cooperative with care, eager to work with therapy. Pt previously completed ADLs with daughter prior to arrival,  "required set up for oral hygiene. Pt comes to standing with CGA-min A throughout session, various reps of functional transfers, requiring reminders of hand placement with each transfer. Pt completes series of STS requiring rest breaks between each rep. Pt demo poor activity tolerance needed for ADL routine, completing static/dynamic standing with CGA however unable to tolerate >2 minutes without rest break. OT changing dc recommendation to SNF as pt likely not able to tolerate high intensity needed to participate at IRF. OT will follow and progress as appropriate. Tolerating session today without incident. Will continue to follow and progress as tolerated.     Plan/Recommendations:   Moderate Intensity Therapy recommended post-acute care. This is recommended as therapy feels the patient would require 3-4 days per week and wouldn't tolerate \"3 hour daily\" rehab intensity. SNF would be the preferred choice. If the patient does not agree to SNF, arrange HH or OP depending on home bound status. If patient is medically complex, consider LTACH.. Pt requires no DME at discharge.     Pt desires Inpatient Rehabilitation placement at discharge. Pt cooperative; agreeable to therapeutic recommendations and plan of care.     Electronically signed by Esperanza Cason OT at 12/06/24 1511       Esperanza Cason OT at 12/06/24 1500          Subjective: Pt agreeable to therapeutic plan of care. Pt with family at bedside, present throughout session. Pt sitting up in chair upon arrival.     Cognition: oriented to Person, Place, Time, and Situation    Objective:     Precautions - high falls risk, [place]    Bed Mobility: N/A or Not attempted.   Functional Transfers: CGA, Min-A, and with rolling walker, verbal cues throughout for hand placement for safety      Balance: supported, static, and standing CGA  Functional Ambulation: CGA and with rolling walker    Daughter previously assisting pt with ADLs prior to arrival, set up for oral hygiene " "    Therapeutic Exercise - 5 Reps STS from chair with CGA-min A with rest breaks between each rep, verbal cues for hand placement for safety    Static/dynamic standing ~2 minutes with CGA with RW demo posterior lean upon standing, able to weight shift appropriately without cues     Vitals: WNL    Pain: 0 VAS  Location: N/A  Interventions for pain: N/A  Education: Provided education on the importance of mobility in the acute care setting, Verbal/Tactile Cues, ADL training, Transfer Training, and Energy conservation strategies, home environmental modifications, education regarding transitioning home with aging in place       Assessment: Ap Roe presents with ADL impairments affecting function including balance, endurance / activity tolerance, postural / trunk control, range of motion (ROM), and strength. Demonstrated functioning below baseline abilities indicate the need for continued skilled intervention while inpatient. Pt pleasant and cooperative with care, eager to work with therapy. Pt previously completed ADLs with daughter prior to arrival, required set up for oral hygiene. Pt comes to standing with CGA-min A throughout session, various reps of functional transfers, requiring reminders of hand placement with each transfer. Pt completes series of STS requiring rest breaks between each rep. Pt demo poor activity tolerance needed for ADL routine, completing static/dynamic standing with CGA however unable to tolerate >2 minutes without rest break. OT changing dc recommendation to SNF as pt likely not able to tolerate high intensity needed to participate at IRF. OT will follow and progress as appropriate. Tolerating session today without incident. Will continue to follow and progress as tolerated.     Plan/Recommendations:   Moderate Intensity Therapy recommended post-acute care. This is recommended as therapy feels the patient would require 3-4 days per week and wouldn't tolerate \"3 hour daily\" rehab " intensity. SNF would be the preferred choice. If the patient does not agree to SNF, arrange HH or OP depending on home bound status. If patient is medically complex, consider LTACH.. Pt requires no DME at discharge.     Pt desires Inpatient Rehabilitation placement at discharge. Pt cooperative; agreeable to therapeutic recommendations and plan of care.     Modified [place]: N/A = No pre-op stroke/TIA    Post-Tx Position: Up in Chair, Alarms activated, and Call light and personal items within reach  PPE: gloves    Therapy Charges for Today       Code Description Service Date Service Provider Modifiers Qty    99072267633 HC OT THERAPEUTIC ACT EA 15 MIN 2024 Esperanza Cason OT GO 1    13611475165 HC OT SELF CARE/MGMT/TRAIN EA 15 MIN 2024 Esperanza Cason OT GO 2           Time Calculation- OT       Row Name 24 1459             Time Calculation- OT    OT Start Time 0838  -MS      OT Stop Time 0923  -MS      OT Time Calculation (min) 45 min  -MS      Total Timed Code Minutes- OT 45 minute(s)  -MS      OT Received On 24  -MS      OT - Next Appointment 24  -MS                User Key  (r) = Recorded By, (t) = Taken By, (c) = Cosigned By      Initials Name Provider Type    MS Esperanza Cason OT Occupational Therapist                      Electronically signed by Esperanza Cason OT at 24 1511       Patient Name: Ap Roe                       : 1/15/1928                        MRN: [id number]                              Today's Date: 2024                                 Admit Date: 2024                      Visit Dx:   Visit Diagnosis       ICD-10-CM ICD-9-CM   1. Weakness  R53.1 780.79   2. Fall, initial encounter  W19.XXXA E888.9   3. Abrasion of knee, bilateral  S80.211A 916.0     S80.212A     4. Acute pain of both knees  M25.561 338.19     M25.562 719.46   5. Left hip pain  M25.552 719.45         Problem List       Patient Active Problem List   Diagnosis    Weakness          Medical History        Past Medical History:   Diagnosis Date    Arthritis      Diabetes 1.5, managed as type 2      Heart disease           Surgical History         Past Surgical History:   Procedure Laterality Date    CHOLECYSTECTOMY        HAND SURGERY        MELANOMA WIDE LOCAL EXCISION Right       cheek    PACEMAKER IMPLANTATION        SKIN GRAFT         scalp           General Information         Row Name 11/27/24 1425                 OT Time and Intention     Document Type evaluation  -LS       Mode of Treatment occupational therapy  -LS       Patient Effort excellent  -          Row Name 11/27/24 1425                 General Information     Patient Profile Reviewed yes  -LS       Prior Level of Function independent:;ADL's;all household mobility  Has a cane and rollator though he does not use them.  -       Existing Precautions/Restrictions fall  -LS       Barriers to Rehab hearing deficit  -          Row Name 11/27/24 1425                 Living Environment     People in Home spouse  -          Row Name 11/27/24 1425                 Home Main Entrance     Number of Stairs, Main Entrance two  -          Row Name 11/27/24 1425                 Stairs Within Home, Primary     Stairs, Within Home, Primary Daughter reports railings soon to be installed  -          Row Name 11/27/24 1425                 Cognition     Orientation Status (Cognition) oriented to;person;place;time;situation  -          Row Name 11/27/24 1425                 Safety Issues/Impairments Affecting Functional Mobility     Impairments Affecting Function (Mobility) balance;endurance/activity tolerance;motor planning;strength;coordination  -       Comment, Safety Issues/Impairments (Mobility) tremors in the BUEs, not PD per dtr  -LS                       User Key  (r) = Recorded By, (t) = Taken By, (c) = Cosigned By        Initials Name Provider Type     Bill Jack OT Occupational Therapist                                      Mobility/ADL's         Row Name 11/27/24 1429                 Bed Mobility     Bed Mobility bed mobility (all) activities  -       Comment, (Bed Mobility) Not tested, in chair upon arrival  -          Row Name 11/27/24 1429                 Transfers     Transfers sit-stand transfer  -          Row Name 11/27/24 1429                 Sit-Stand Transfer     Sit-Stand [place] (Transfers) minimum assist (75% patient effort)  -       Comment, (Sit-Stand Transfer) hand held assist  -          Row Name 11/27/24 1429                 Functional Mobility     Functional Mobility- Ind. Level minimum assist (75% patient effort)  -       Patient was able to Ambulate yes  -          Row Name 11/27/24 1429                 Activities of Daily Living     BADL Assessment/Intervention lower body dressing  -          Row Name 11/27/24 1429                 Lower Body Dressing Assessment/Training     [place] Level (Lower Body Dressing) lower body dressing skills;maximum assist (25% patient effort);socks  -       Position (Lower Body Dressing) supported sitting  -                       User Key  (r) = Recorded By, (t) = Taken By, (c) = Cosigned By        Initials Name Provider Type     Bill Jack OT Occupational Therapist                            Obj/Interventions         Row Name 11/27/24 1431                 Sensory Assessment (Somatosensory)     Sensory Assessment (Somatosensory) sensation intact  -          Row Name 11/27/24 1431                 Vision Assessment/Intervention     Visual Impairment/Limitations WFL  -          Row Name 11/27/24 1431                 Range of Motion Comprehensive     General Range of Motion bilateral upper extremity ROM WFL  -          Row Name 11/27/24 1431                 Strength Comprehensive (MMT)     Comment, General Manual Muscle Testing (MMT) Assessment BUE grossly 3+/5  -          Row Name 11/27/24 1431                 Balance     Balance  Assessment sitting static balance;standing static balance;standing dynamic balance;sitting dynamic balance  -LS       Static Sitting Balance independent  -LS       Dynamic Sitting Balance independent  -LS       Position, Sitting Balance supported;sitting in chair  -LS       Static Standing Balance minimal assist  -LS       Dynamic Standing Balance minimal assist  -LS       Position/Device Used, Standing Balance supported  -LS       Comment, Balance hand held assistance  -LS                       User Key  (r) = Recorded By, (t) = Taken By, (c) = Cosigned By        Initials Name Provider Type     Bill Jack OT Occupational Therapist                            Goals/Plan         Row Name 11/27/24 144                 Bed Mobility Goal 1 (OT)     Activity/Assistive Device (Bed Mobility Goal 1, OT) bed mobility activities, all  -LS       [place] Level/Cues Needed (Bed Mobility Goal 1, OT) modified independence  -LS       Time Frame (Bed Mobility Goal 1, OT) long term goal (LTG);2 weeks  -          Row Name 11/27/24 1443                 Transfer Goal 1 (OT)     Activity/Assistive Device (Transfer Goal 1, OT) transfers, all  -LS       [place] Level/Cues Needed (Transfer Goal 1, OT) modified independence  -LS       Time Frame (Transfer Goal 1, OT) long term goal (LTG);2 weeks  -          Row Name 11/27/24 1447                 Dressing Goal 1 (OT)     Activity/Device (Dressing Goal 1, OT) dressing skills, all  -LS       [place]/Cues Needed (Dressing Goal 1, OT) modified independence  -LS       Time Frame (Dressing Goal 1, OT) long term goal (LTG);2 weeks  -          Row Name 11/27/24 1443                 Toileting Goal 1 (OT)     Activity/Device (Toileting Goal 1, OT) toileting skills, all  -LS       [place] Level/Cues Needed (Toileting Goal 1, OT) modified independence  -LS       Time Frame (Toileting Goal 1, OT) long term goal (LTG);2 weeks  -          Row Name 11/27/24 5718                  Therapy Assessment/Plan (OT)     Planned Therapy Interventions (OT) activity tolerance training;BADL retraining;functional balance retraining;IADL retraining;occupation/activity based interventions;ROM/therapeutic exercise;strengthening exercise;transfer/mobility retraining;patient/caregiver education/training  -                    User Key  (r) = Recorded By, (t) = Taken By, (c) = Cosigned By        Initials Name Provider Type     LS Bill Whitley OT Occupational Therapist                         Clinical Impression         Row Name 11/27/24 1435                 Pain Assessment     Pretreatment Pain Rating 0/10 - no pain  -LS       Posttreatment Pain Rating 0/10 - no pain  -LS          Row Name 11/27/24 1435                 Plan of Care Review     Plan of Care Reviewed With patient  -LS       Outcome Evaluation 89-year-old male presented via EMS from home after a mechanical fall. Patient states he tripped going up the steps in his garage landing on his knees. He has abrasions to bilateral knees, but imaging negative. This date, pt AOx4 and sitting in chair on arrival. Wife and daughter also present. At baseline, pt lives with his spouse in a [facility] with 2 ISMAEL. Daughter reports they will be getting railing installed, as this is where pt fell. He is normally IND with ADLs and functional mobility without AD, though reports he has recently aquired a rollator and cane. OT provided education regarding transfer tub bench for safety while bathing, which his daughter took interest in and reported that she would aquire. From chair level, patient requires Mod-Max A with lower body dressing. Sit to stand completed with Min A, but required multiple trials. Min A required for balance in standing and patient able to briefly march in place requiring Min A. He has good BUE ROM, but strength is limited and he is noted to have tremors which negtively impact his fine motor control. Dtr reports this is chronic and not Parkinson's.  Overall, pt is well below his baseline level of independent, and unsafe to return home with only his aging wife present to assist. OT recommend acute IP rehab at the time of d/c. Will follow.  -          Row Name 11/27/24 1435                 Therapy Assessment/Plan (OT)     Rehab Potential (OT) good  -LS       Criteria for Skilled Therapeutic Interventions Met (OT) yes;skilled treatment is necessary  -LS       Therapy Frequency (OT) 5 times/wk  -LS       Predicted Duration of Therapy Intervention (OT) until dc  -LS          Row Name 11/27/24 1435                 Therapy Plan Review/Discharge Plan (OT)     Anticipated Discharge Disposition (OT) inpatient rehabilitation facility  -          Row Name 11/27/24 1435                 Vital Signs     O2 Delivery Pre Treatment room air  -LS       O2 Delivery Intra Treatment room air  -LS       O2 Delivery Post Treatment room air  -LS       Pre Patient Position Sitting  -LS       Intra Patient Position Standing  -LS       Post Patient Position Sitting  -          Row Name 11/27/24 1435                 Positioning and Restraints     Pre-Treatment Position sitting in chair/recliner  -LS       Post Treatment Position chair  -LS       In Chair notified nsg;sitting;call light within reach;encouraged to call for assist;exit alarm on  -LS                       User Key  (r) = Recorded By, (t) = Taken By, (c) = Cosigned By        Initials Name Provider Type     LS Bill Whitley OT Occupational Therapist                            Outcome Measures         Row Name 11/27/24 1444                 How much help from another is currently needed...     Putting on and taking off regular lower body clothing? 2  -LS       Bathing (including washing, rinsing, and drying) 2  -LS       Toileting (which includes using toilet bed pan or urinal) 2  -LS       Putting on and taking off regular upper body clothing 3  -LS       Taking care of personal grooming (such as brushing teeth) 3  -LS        Eating meals 4  -LS       AM-PAC 6 Clicks Score (OT) 16  -LS          Row Name 11/27/24 1153 11/27/24 0820            How much help from another person do you currently need...     Turning from your back to your side while in flat bed without using bedrails? 3  -OD 4  -RR     Moving from lying on back to sitting on the side of a flat bed without bedrails? 3  -OD 4  -RR     Moving to and from a bed to a chair (including a wheelchair)? 2  -OD 3  -RR     Standing up from a chair using your arms (e.g., wheelchair, bedside chair)? 2  -OD 2  -RR     Climbing 3-5 steps with a railing? 1  -OD 2  -RR     To walk in hospital room? 2  -OD 2  -RR     AM-PAC 6 Clicks Score (PT) 13  -OD 17  -RR     Highest Level of Mobility Goal 4 --> Transfer to chair/commode  -OD 5 --> Static standing  -RR        Row Name 11/27/24 1445 11/27/24 1153            Functional Assessment     Outcome Measure Options AM-PAC 6 Clicks Daily Activity (OT)  -LS AM-PAC 6 Clicks Basic Mobility (PT)  -OD                     User Key  (r) = Recorded By, (t) = Taken By, (c) = Cosigned By        Initials Name Provider Type     Bill Jack, OT Occupational Therapist     OD Brandy Vallecillo, PT Physical Therapist     RR Vonnie Bowen, RN Registered Nurse                             Occupational Therapy Education            Title: PT OT SLP Therapies (Done)         Topic: Occupational Therapy (Done)         Point: ADL training (Done)         Description:   Instruct learner(s) on proper safety adaptation and remediation techniques during self care or transfers.   Instruct in proper use of assistive devices.                       Learning Progress Summary             Patient Acceptance, E,TB, VU by EILEEN at 11/27/2024 1446                            Point: Precautions (Done)         Description:   Instruct learner(s) on prescribed precautions during self-care and functional transfers.                       Learning Progress Summary             Patient Acceptance,  E,TB, VU by  at 11/27/2024 1446                            Point: Body mechanics (Done)         Description:   Instruct learner(s) on proper positioning and spine alignment during self-care, functional mobility activities and/or exercises.                       Learning Progress Summary             Patient Acceptance, E,TB, VU by  at 11/27/2024 1446                                                User Key         Initials Effective Dates Name Provider Type Discipline      09/22/22 -  Bill Whitley OT Occupational Therapist OT                          OT Recommendation and Plan  Planned Therapy Interventions (OT): activity tolerance training, BADL retraining, functional balance retraining, IADL retraining, occupation/activity based interventions, ROM/therapeutic exercise, strengthening exercise, transfer/mobility retraining, patient/caregiver education/training  Therapy Frequency (OT): 5 times/wk  Plan of Care Review  Plan of Care Reviewed With: patient  Outcome Evaluation: 89-year-old male presented via EMS from home after a mechanical fall. Patient states he tripped going up the steps in his garage landing on his knees. He has abrasions to bilateral knees, but imaging negative. This date, pt AOx4 and sitting in chair on arrival. Wife and daughter also present. At baseline, pt lives with his spouse in a [facility] with 2 ISMAEL. Daughter reports they will be getting railing installed, as this is where pt fell. He is normally IND with ADLs and functional mobility without AD, though reports he has recently aquired a rollator and cane. OT provided education regarding transfer tub bench for safety while bathing, which his daughter took interest in and reported that she would aquire. From chair level, patient requires Mod-Max A with lower body dressing. Sit to stand completed with Min A, but required multiple trials. Min A required for balance in standing and patient able to briefly march in place requiring Min A. He has  good BUE ROM, but strength is limited and he is noted to have tremors which negtively impact his fine motor control. Dtr reports this is chronic and not Parkinson's. Overall, pt is well below his baseline level of independent, and unsafe to return home with only his aging wife present to assist. OT recommend acute IP rehab at the time of d/c. Will follow.      Time Calculation:        Time Calculation- OT         Row Name 11/27/24 1446                       Time Calculation- OT     OT Start Time 1202  -LS         OT Stop Time 1223  -LS         OT Time Calculation (min) 21 min  -LS         OT Received On 11/27/24  -         OT - Next Appointment 11/29/24  -         OT Goal Re-Cert Due Date 12/11/24  -LS                 Untimed Charges     OT Eval/Re-eval Minutes 21  -LS                 Total Minutes     Untimed Charges Total Minutes 21  -LS          Total Minutes 21  -LS                      User Key  (r) = Recorded By, (t) = Taken By, (c) = Cosigned By        Initials Name Provider Type      Bill Whitley OT Occupational Therapist                       Therapy Charges for Today         Code Description Service Date Service Provider Modifiers Qty     [id number] HC OT EVAL MOD COMPLEXITY 4 11/27/2024 Bill Whitley OT GO 1                   Bill Whitley OT                  11/27/2024

## 2024-12-06 NOTE — PLAN OF CARE
"Assessment: Ap Roe presents with ADL impairments affecting function including balance, endurance / activity tolerance, postural / trunk control, range of motion (ROM), and strength. Demonstrated functioning below baseline abilities indicate the need for continued skilled intervention while inpatient. Pt pleasant and cooperative with care, eager to work with therapy. Pt previously completed ADLs with daughter prior to arrival, required set up for oral hygiene. Pt comes to standing with CGA-min A throughout session, various reps of functional transfers, requiring reminders of hand placement with each transfer. Pt completes series of STS requiring rest breaks between each rep. Pt demo poor activity tolerance needed for ADL routine, completing static/dynamic standing with CGA however unable to tolerate >2 minutes without rest break. OT changing dc recommendation to SNF as pt likely not able to tolerate high intensity needed to participate at IRF. OT will follow and progress as appropriate. Tolerating session today without incident. Will continue to follow and progress as tolerated.     Plan/Recommendations:   Moderate Intensity Therapy recommended post-acute care. This is recommended as therapy feels the patient would require 3-4 days per week and wouldn't tolerate \"3 hour daily\" rehab intensity. SNF would be the preferred choice. If the patient does not agree to SNF, arrange HH or OP depending on home bound status. If patient is medically complex, consider LTACH.. Pt requires no DME at discharge.     Pt desires Inpatient Rehabilitation placement at discharge. Pt cooperative; agreeable to therapeutic recommendations and plan of care.   "

## 2024-12-06 NOTE — PROGRESS NOTES
[facility] MEDICINE SERVICE  DAILY PROGRESS NOTE    NAME: Ap Roe  : 1/15/1928  MRN: [id number]      LOS: 9 days     PROVIDER OF SERVICE: Twyla Johnson MD    Chief Complaint: Weakness    Subjective:     Interval History:  History taken from: patient    Patient is otherwise feeling well this morning.  Daughter is present at bedside.  No acute overnight events noted.  Noted laboratory studies.  Creatinine elevated.  Sodium decreased.        Review of Systems:   Review of Systems   Constitutional: Negative.    Respiratory: Negative.     Cardiovascular: Negative.    Gastrointestinal: Negative.    Musculoskeletal: Negative.    Neurological: Negative.    Psychiatric/Behavioral: Negative.         Objective:     Vital Signs  Temp:  [97.4 °F (36.3 °C)-98 °F (36.7 °C)] 97.8 °F (36.6 °C)  Heart Rate:  [60-73] 60  Resp:  [13-20] 18  BP: (107-131)/(43-62) 107/43   Body mass index is 27.96 kg/m².    Physical Exam  Physical Exam  Constitutional:       General: He is awake.      Appearance: Normal appearance. He is well-developed and well-groomed.   HENT:      Head: Normocephalic and atraumatic.      Nose: Nose normal.      Mouth/Throat:      Mouth: Mucous membranes are moist.      Pharynx: Oropharynx is clear.   Eyes:      Extraocular Movements: Extraocular movements intact.      Conjunctiva/sclera: Conjunctivae normal.      Pupils: Pupils are equal, round, and reactive to light.   Cardiovascular:      Rate and Rhythm: Normal rate and regular rhythm.      Pulses: Normal pulses.      Heart sounds: Normal heart sounds.   Pulmonary:      Effort: Pulmonary effort is normal.      Breath sounds: Normal breath sounds.   Abdominal:      General: Abdomen is flat. Bowel sounds are normal.      Palpations: Abdomen is soft.   Musculoskeletal:         General: Normal range of motion.      Cervical back: Normal range of motion and neck supple.      Right lower leg: Edema present.      Left lower leg: Edema present.   Skin:      General: Skin is warm and dry.   Neurological:      General: No focal deficit present.      Mental Status: He is alert and oriented to person, place, and time. Mental status is at baseline.      Cranial Nerves: Cranial nerves 2-12 are intact.      Sensory: Sensation is intact.      Motor: Motor function is intact.   Psychiatric:         Mood and Affect: Mood normal.         Behavior: Behavior normal. Behavior is cooperative.         Thought Content: Thought content normal.         Judgment: Judgment normal.            Diagnostic Data    Results from last 7 days   Lab Units 12/06/24  0735 12/05/24  1014 12/05/24  0943   WBC 10*3/mm3  --  6.03  --    HEMOGLOBIN g/dL  --  10.6*  --    HEMATOCRIT %  --  30.9*  --    PLATELETS 10*3/mm3  --  188  --    GLUCOSE mg/dL 169*  --  181*   CREATININE mg/dL 1.46*  --  1.09   BUN mg/dL 32*  --  27*   SODIUM mmol/L 127*  --  132*   POTASSIUM mmol/L 4.3  --  4.0   AST (SGOT) U/L  --   --  35   ALT (SGPT) U/L  --   --  13   ALK PHOS U/L  --   --  71   BILIRUBIN mg/dL  --   --  0.5   ANION GAP mmol/L 10.5  --  7.9       No radiology results for the last day      I reviewed the patient's new clinical results.    Assessment/Plan:     Active and Resolved Problems  Active Hospital Problems    Diagnosis  POA    **Weakness [R53.1]  Yes      Resolved Hospital Problems   No resolved problems to display.     Status post fall at home  Generalized weakness  Hypomagnesemia   Diabetes mellitus  Anemia of chronic disease  Atrial fibrillation  Hypertension  Acute confusional state  Hyponatremia, chronic--with worsening this hospital admission  Acute kidney injury    No labs done this morning. Yesterday's labs have been reviewed. Nephrology continues to follow.     Noted decreased blood pressure this morning. Albumin orders given as per nephrology--yesterday.  Further albumin orders as per nephrology recommendations.  Noted elevated creatinine as well as sodium level this morning.  Defer further  management as per renal.    Patient is otherwise comfortable and sitting up in his chair this morning. He no longer endorses any confusion. He is alert and orient x 4. There is a strong likelihood the patient will benefit significantly from acute inpatient rehab secondary to excellent functional status that he had prior to being admitted. Patient was independent at home. He was driving and manages his own medications as well as finances on his own independently.     Given his prolonged hospital course and daily deconditioning I would strongly advised patient to be discharged to an acute inpatient rehab facility. Patient is currently being held during the appeal process to an acute inpatient rehab facility. Noted therapy evaluation have also made similar recommendations.         VTE Prophylaxis:  Pharmacologic VTE prophylaxis orders are present.             Disposition Planning:     Barriers to Discharge: Insurance approval, appeal for discharge to acute rehab facility, placement to subacute possibly.  Anticipated Date of Discharge: 12/9/2024  Place of Discharge: Subacute versus acute rehab pending insurance approval and/or appeal process.      Time: 25 minutes     Code Status and Medical Interventions: No CPR (Do Not Attempt to Resuscitate); Full Support   Ordered at: 11/28/24 1024     Level Of Support Discussed With:    Next of Kin (If No Surrogate)     Code Status (Patient has no pulse and is not breathing):    No CPR (Do Not Attempt to Resuscitate)     Medical Interventions (Patient has pulse or is breathing):    Full Support       Signature: Electronically signed by Twyla Johnson MD, 12/06/24, 11:58 EST.  [facility] Hospitalist Team

## 2024-12-06 NOTE — PLAN OF CARE
Goal Outcome Evaluation:      Pt still ambulating well with walker. Precert pending to [facility] Daughter and wife at bedside. BP continues to be soft today. Plan of care on going.    Problem: Adult Inpatient Plan of Care  Goal: Plan of Care Review  12/6/2024 1702 by Tali Duque, RN  Outcome: Progressing  12/6/2024 1605 by Tali Duque, RN  Outcome: Progressing

## 2024-12-06 NOTE — THERAPY TREATMENT NOTE
"Subjective: Pt agreeable to therapeutic plan of care.  Patient cleared for therapy by nursing.  Patient up in chair, is pleasant and agreeable    Objective:     Precautions - falls    Bed mobility - found up in chair  Transfers - CGA-Stanton to CTS, Stanton for eccentric control with return to sit  Ambulation - 45 and x40 feet CGA and with rolling walker cues for posture and pacing    Sit<>stand x5 reps for strengthening and working on carryover for hand placement with RW; requires prompts for each rep.  Cues and physical assist for eccentric control.  Static standing x15-20 reps with each bout using RW and requiring CGA for standing balance.    Therapeutic Exercise -seated: AP, LAQ standing: MIP, heel raises, toe raises x10 reps each     Vitals:   129/59mmHG 71bpm, 97%    Pain: 0 VAS   L  Education: Provided education on the importance of mobility in the acute care setting, Transfer Training, and Gait Training    Assessment: Ap Roe presents with functional mobility impairments which indicate the need for skilled intervention.  Patient fatigues quickly and but vitals stable throughout.  Patient requires CGA-Stanton for balance and is progressing slowly with balance and functional mobility.  His home set-up, falls risk,  and current reliance on RW are barriers to return home at this time.  Recommend SNF level of care as the patient would benefit from reduced intensity or rehabilitation for increased duration-- continue to anticipate eventual safe return to a home setting once LE strength and balance have improved.  Tolerating session today without incident. Will continue to follow and progress as tolerated.     Plan/Recommendations:   If medically appropriate, Moderate Intensity Therapy recommended post-acute care. This is recommended as therapy feels the patient would require 3-4 days per week and wouldn't tolerate \"3 hour daily\" rehab intensity. SNF would be the preferred choice. If the patient does not agree to " SNF, arrange HH or OP depending on home bound status. If patient is medically complex, consider LTACH. Pt requires no DME at discharge.     Pt desires Inpatient Rehabilitation placement at discharge. Pt cooperative; agreeable to therapeutic recommendations and plan of care.         Basic Mobility 6-click:  Rollin = Total, A lot = 2, A little = 3; 4 = None  Supine>Sit:   1 = Total, A lot = 2, A little = 3; 4 = None   Sit>Stand with arms:  1 = Total, A lot = 2, A little = 3; 4 = None  Bed>Chair:   1 = Total, A lot = 2, A little = 3; 4 = None  Ambulate in room:  1 = Total, A lot = 2, A little = 3; 4 = None  3-5 Steps with railin = Total, A lot = 2, A little = 3; 4 = None  Score: 17    Modified Abigail: N/A = No pre-op stroke/TIA    Post-Tx Position: Up in Chair, Alarms activated, and Call light and personal items within reach  PPE: gloves

## 2024-12-06 NOTE — PAYOR COMM NOTE
"This is additional clinical for Luh Roe.    AUTHORIZATION PENDIN   PLEASE FAX OR CALL DETERMINATION TO CONTACT BELOW:   THANK YOU.      Elva Laurent RN, CCM  Utilization Nurse  [address] [address]   [phone]  Fx [phone]     Luh Roe (89 y.o. Male)       Date of Birth   01/15/1928    Social Security Number       Address   [address] [place] [address]    Home Phone   [phone]    MRN   [id number]       Yazdanism   Presybeterian    Marital Status                               Admission Date   24    Admission Type   Emergency    Admitting Provider   hSaji Loredo MD    Attending Provider   Twyla Johnson MD    Department, Room/Bed   [facility] PROGRESS CARE,        Discharge Date       Discharge Disposition       Discharge Destination                                 Attending Provider: Twyla Johnson MD    Allergies: Celebrex [Celecoxib]    Isolation: None   Infection: None   Code Status: No CPR    Ht: 172.7 cm (68\")   Wt: 83.4 kg (183 lb 13.8 oz)    Admission Cmt: None   Principal Problem: Weakness [R53.1]                   Active Insurance as of 2024       Primary Coverage       Payor Plan Insurance Group Employer/Plan Group    HUMANA MEDICARE REPLACEMENT HUMANA MED ADV GROUP [id number]       Payor Plan Address Payor Plan Phone Number Payor Plan Fax Number Effective Dates    [address] [address] [phone]  2018 - None Entered    Formerly [facility] [address]         Subscriber Name Subscriber Birth Date Member ID       LUH ROE 1/15/1928 [id number]                     Emergency Contacts        (Rel.) Home Phone Work Phone Mobile Phone    MICHAEL MENDOZA (Daughter) -- -- [phone]    RILEY ROE (Spouse) [phone] -- --    Joyce Marte (Daughter) [phone] -- --              Operative/Procedure Notes (last 72 hours)  Notes from 24 through 24 "   No notes of this type exist for this encounter.          Physician Progress Notes (last 72 hours)        Darrell Perez MD at 24 1507              PROGRESS NOTE      Patient Name: Ap Roe  : 1/15/1928  MRN: [id number]  Primary Care Physician: Elva Mota MD  Date of admission: 2024    Patient Care Team:  Elva Mota MD as PCP - General (Internal Medicine)        Subjective   Subjective:     Patient seen and examined   NAD noted  Creatinine essentially unchanged    Review of systems:  ROS negative      Allergies:    Allergies   Allergen Reactions    Celebrex [Celecoxib] Hives       Objective   Exam:     Vital Signs  Temp:  [97.4 °F (36.3 °C)-98.3 °F (36.8 °C)] 97.9 °F (36.6 °C)  Heart Rate:  [60-71] 61  Resp:  [13-21] 15  BP: ()/(37-65) 117/49  SpO2:  [92 %-98 %] 98 %  on   ;   Device (Oxygen Therapy): room air  Body mass index is 27.65 kg/m².    General:   male in no acute distress.    Head:      Normocephalic and atraumatic.    Eyes:      PERRL/EOM intact, conjunctivae and sclerae clear without nystagmus.    Neck:      No masses, thyromegaly,  trachea central with normal respiratory effort   Lungs:    Clear bilaterally to auscultation.    Heart:      Regular rate and rhythm, no murmur no gallop  Abd:        Soft, nontender, not distended, bowel sounds positive, no shifting dullness   Pulses:   Pulses palpable  Extr:        No cyanosis or clubbing--+edema.    Neuro:    No focal deficits.   alert oriented x3  Skin:       Intact without lesions or rashes.    Psych:    Alert and cooperative; normal mood and affect; .      Results Review:  I have personally reviewed most recent Data :  CBC    Results from last 7 days   Lab Units 24  1014 24  0301 24  0832 24  0044   WBC 10*3/mm3 6.03 5.67 5.77 5.09   HEMOGLOBIN g/dL 10.6* 10.6* 9.9* 10.3*   PLATELETS 10*3/mm3 188 184 164 200     CMP   Results from last 7 days   Lab Units 24  0943 24  0302  12/02/24  1717 11/30/24  0832 11/29/24  0044   SODIUM mmol/L 132* 128* 123* 130* 129*   POTASSIUM mmol/L 4.0 4.8 5.3* 3.9 4.1   CHLORIDE mmol/L 105 95* 95* 97* 94*   CO2 mmol/L 19.1* 22.1 19.2* 22.3 24.6   BUN mg/dL 27* 34* 33* 25* 24*   CREATININE mg/dL 1.09 1.46* 1.54* 1.18 1.38*   GLUCOSE mg/dL 181* 175* 276* 141* 108*   ALBUMIN g/dL 2.6*  --  3.4* 3.3*  --    BILIRUBIN mg/dL 0.5  --  0.5 0.6  --    ALK PHOS U/L 71  --  102 75  --    AST (SGOT) U/L 35  --  34 27  --    ALT (SGPT) U/L 13  --  16 14  --      ABG      No radiology results for the last day    Results for orders placed during the hospital encounter of 11/26/24    Adult Transthoracic Echo Complete W/ Cont if Necessary Per Protocol    Interpretation Summary    Left ventricular systolic function is low normal. Left ventricular ejection fraction appears to be 51 - 55%.    Left ventricular diastolic function is consistent with (grade Ia w/high LAP) impaired relaxation.    The left atrial cavity is moderately dilated.    Left atrial volume is moderately increased.    There is moderate calcification of the aortic valve.    Estimated right ventricular systolic pressure from tricuspid regurgitation is normal (<35 mmHg).    Scheduled Meds:amiodarone, 200 mg, Oral, Daily  apixaban, 5 mg, Oral, Q12H  atorvastatin, 10 mg, Oral, Daily  [START ON 12/6/2024] furosemide, 20 mg, Oral, Daily  insulin lispro, 2-7 Units, Subcutaneous, 4x Daily AC & at Bedtime  [START ON 12/6/2024] losartan, 25 mg, Oral, Daily  magnesium sulfate, 2 g, Intravenous, Q2H  pioglitazone, 30 mg, Oral, Daily  propranolol, 80 mg, Oral, Daily  sodium chloride, 10 mL, Intravenous, Q12H  tamsulosin, 0.4 mg, Oral, Daily      Continuous Infusions:   PRN Meds:  senna-docusate sodium **AND** polyethylene glycol **AND** bisacodyl **AND** bisacodyl    dextrose    dextrose    glucagon (human recombinant)    Magnesium Standard Dose Replacement - Follow Nurse / BPA Driven Protocol    melatonin    [COMPLETED]  Insert Peripheral IV **AND** sodium chloride    sodium chloride    sodium chloride    Assessment & Plan   Assessment and Plan:         Weakness    ASSESSMENT:  Acute kidney injury: Likely related to angiotensin receptor blocker and may be some cardiorenal syndrome specially with the presence of diastolic dysfunctions  Congestive heart failure with grade 1 diastolic dysfunction  History of hypertension  History of diabetes mellitus  Atrial fibrillation              PLAN :   Patient with GI as above, creatinine relatively unchanged today   Patient has significant edema and with some crackles in the lungs, has grade 1 diastolic dysfunctions. S/p 1X dose of Bumex yesterday, continue scheduled Lasix  Hyperkalemia resolved s/p Lokelma   Potassium level is getting better  Blood pressure slightly soft today mild acidosis sodium level is acceptable  Continue some fluid restriction continue loop diuretics  Patient peripheral edema has improved  Continue Lasix 20 mg a day will increase to 20 mg twice daily if needed  I will give 1 dose of albumin to improve patient blood pressure  Remaining electrolytes/acid base stable  Manage blood sugar aggressively that also contributing to some hyponatremia   Blood pressure stable o will decrease losartan to 25 mg a day  Daily labs   We will continue to follow        Electronically signed by   Darrell Perez MD.   [facility] kidney consultant  [phone]  2024  15:07 EST       Electronically signed by Darrell Perez MD at 24 1516       Twyla Johnson MD at 24 [facility]9              [facility] MEDICINE SERVICE  DAILY PROGRESS NOTE    NAME: Ap Roe  : 1/15/1928  MRN: [id number]      LOS: 8 days     PROVIDER OF SERVICE: Twyla Johnson MD    Chief Complaint: Weakness    Subjective:     Interval History:  History taken from: patient    Patient otherwise appears well.  He is sitting up in his chair.  He slept well overnight.        Review of Systems:   Review of  Systems   Constitutional: Negative.    Respiratory: Negative.     Cardiovascular: Negative.    Gastrointestinal: Negative.    Musculoskeletal: Negative.    Neurological: Negative.    Psychiatric/Behavioral: Negative.         Objective:     Vital Signs  Temp:  [97.4 °F (36.3 °C)-98.3 °F (36.8 °C)] 97.6 °F (36.4 °C)  Heart Rate:  [60-71] 60  Resp:  [13-21] 21  BP: ()/(37-65) 95/37   Body mass index is 27.65 kg/m².    Physical Exam  Physical Exam  Constitutional:       General: He is awake.      Appearance: Normal appearance. He is well-developed and well-groomed.   HENT:      Head: Normocephalic and atraumatic.      Nose: Nose normal.      Mouth/Throat:      Mouth: Mucous membranes are moist.      Pharynx: Oropharynx is clear.   Eyes:      Extraocular Movements: Extraocular movements intact.      Conjunctiva/sclera: Conjunctivae normal.      Pupils: Pupils are equal, round, and reactive to light.   Cardiovascular:      Rate and Rhythm: Normal rate and regular rhythm.      Pulses: Normal pulses.      Heart sounds: Normal heart sounds.   Pulmonary:      Effort: Pulmonary effort is normal.      Breath sounds: Normal breath sounds.   Abdominal:      General: Abdomen is flat. Bowel sounds are normal.      Palpations: Abdomen is soft.   Musculoskeletal:         General: Normal range of motion.      Cervical back: Normal range of motion and neck supple.      Right lower leg: No edema.      Left lower leg: No edema.   Skin:     General: Skin is warm and dry.   Neurological:      General: No focal deficit present.      Mental Status: He is alert and oriented to person, place, and time. Mental status is at baseline.      Cranial Nerves: Cranial nerves 2-12 are intact.      Sensory: Sensation is intact.      Motor: Motor function is intact.   Psychiatric:         Mood and Affect: Mood normal.         Behavior: Behavior normal. Behavior is cooperative.         Thought Content: Thought content normal.         Judgment:  Judgment normal.            Diagnostic Data    Results from last 7 days   Lab Units 12/05/24  1014 12/05/24  0943   WBC 10*3/mm3 6.03  --    HEMOGLOBIN g/dL 10.6*  --    HEMATOCRIT % 30.9*  --    PLATELETS 10*3/mm3 188  --    GLUCOSE mg/dL  --  181*   CREATININE mg/dL  --  1.09   BUN mg/dL  --  27*   SODIUM mmol/L  --  132*   POTASSIUM mmol/L  --  4.0   AST (SGOT) U/L  --  35   ALT (SGPT) U/L  --  13   ALK PHOS U/L  --  71   BILIRUBIN mg/dL  --  0.5   ANION GAP mmol/L  --  7.9       No radiology results for the last day      I reviewed the patient's new clinical results.    Assessment/Plan:     Active and Resolved Problems  Active Hospital Problems    Diagnosis  POA    **Weakness [R53.1]  Yes      Resolved Hospital Problems   No resolved problems to display.     Status post fall at home  Generalized weakness  Hypomagnesemia   Diabetes mellitus  Anemia of chronic disease  Atrial fibrillation  Hypertension  Acute confusional state  Hyponatremia, chronic--with worsening this hospital admission  Acute kidney injury      No labs done this morning.  Yesterday's labs have been reviewed.  Nephrology continues to follow.  Noted decreased blood pressure this morning.  Albumin orders given as per nephrology.  Patient is otherwise comfortable and sitting up in his chair this morning.  He no longer endorses any confusion.  He is alert and orient x 4.  There is a strong likelihood the patient will benefit significantly from acute inpatient rehab secondary to excellent functional status that he had prior to being admitted.  Patient was independent at home.  He was driving and manages his own medications as well as finances on his own independently.  Given his prolonged hospital course and daily deconditioning I would strongly advised patient to be discharged to an acute inpatient rehab facility.  Patient is currently being held during the appeal process to an acute inpatient rehab facility.  Noted therapy evaluation have also made  similar recommendations.          VTE Prophylaxis:  Pharmacologic VTE prophylaxis orders are present.             Disposition Planning:     Barriers to Discharge: Insurance approval  Anticipated Date of Discharge: 2024  Place of Discharge: Rehab      Time: 45 minutes     Code Status and Medical Interventions: No CPR (Do Not Attempt to Resuscitate); Full Support   Ordered at: 24 1024     Level Of Support Discussed With:    Next of Kin (If No Surrogate)     Code Status (Patient has no pulse and is not breathing):    No CPR (Do Not Attempt to Resuscitate)     Medical Interventions (Patient has pulse or is breathing):    Full Support       Signature: Electronically signed by Twyla Johnson MD, 24, 10:29 EST.  [facility] Hospitalist Team      Electronically signed by Twyla Johnson MD at 24 1041       Darrell Perez MD at 24 1123              PROGRESS NOTE      Patient Name: Ap Roe  : 1/15/1928  MRN: [id number]  Primary Care Physician: Elva Mota MD  Date of admission: 2024    Patient Care Team:  Elva Mota MD as PCP - General (Internal Medicine)        Subjective   Subjective:     Patient seen and examined   NAD noted  Creatinine essentially unchanged    Review of systems:  ROS negative      Allergies:    Allergies   Allergen Reactions    Celebrex [Celecoxib] Hives       Objective   Exam:     Vital Signs  Temp:  [97.5 °F (36.4 °C)-97.7 °F (36.5 °C)] 97.5 °F (36.4 °C)  Heart Rate:  [60-68] 68  Resp:  [16-20] 16  BP: (104-141)/(52-65) 141/65  SpO2:  [95 %-98 %] 97 %  on   ;   Device (Oxygen Therapy): room air  Body mass index is 26.85 kg/m².    General:   male in no acute distress.    Head:      Normocephalic and atraumatic.    Eyes:      PERRL/EOM intact, conjunctivae and sclerae clear without nystagmus.    Neck:      No masses, thyromegaly,  trachea central with normal respiratory effort   Lungs:    Clear bilaterally to auscultation.    Heart:       Regular rate and rhythm, no murmur no gallop  Abd:        Soft, nontender, not distended, bowel sounds positive, no shifting dullness   Pulses:   Pulses palpable  Extr:        No cyanosis or clubbing--+edema.    Neuro:    No focal deficits.   alert oriented x3  Skin:       Intact without lesions or rashes.    Psych:    Alert and cooperative; normal mood and affect; .      Results Review:  I have personally reviewed most recent Data :  CBC    Results from last 7 days   Lab Units 12/04/24  0301 11/30/24  0832 11/29/24  0044   WBC 10*3/mm3 5.67 5.77 5.09   HEMOGLOBIN g/dL 10.6* 9.9* 10.3*   PLATELETS 10*3/mm3 184 164 200     CMP   Results from last 7 days   Lab Units 12/04/24  0301 12/02/24  1717 11/30/24  0832 11/29/24  0044 11/28/24  0554   SODIUM mmol/L 128* 123* 130* 129* 132*   POTASSIUM mmol/L 4.8 5.3* 3.9 4.1 3.6   CHLORIDE mmol/L 95* 95* 97* 94* 94*   CO2 mmol/L 22.1 19.2* 22.3 24.6 25.2   BUN mg/dL 34* 33* 25* 24* 21   CREATININE mg/dL 1.46* 1.54* 1.18 1.38* 1.22   GLUCOSE mg/dL 175* 276* 141* 108* 136*   ALBUMIN g/dL  --  3.4* 3.3*  --   --    BILIRUBIN mg/dL  --  0.5 0.6  --   --    ALK PHOS U/L  --  102 75  --   --    AST (SGOT) U/L  --  34 27  --   --    ALT (SGPT) U/L  --  16 14  --   --      ABG      No radiology results for the last day      Scheduled Meds:amiodarone, 200 mg, Oral, Daily  apixaban, 5 mg, Oral, Q12H  atorvastatin, 10 mg, Oral, Daily  furosemide, 20 mg, Oral, Daily  insulin lispro, 2-7 Units, Subcutaneous, 4x Daily AC & at Bedtime  losartan, 50 mg, Oral, Daily  pioglitazone, 30 mg, Oral, Daily  propranolol, 80 mg, Oral, Daily  sodium chloride, 10 mL, Intravenous, Q12H  tamsulosin, 0.4 mg, Oral, Daily      Continuous Infusions:   PRN Meds:  senna-docusate sodium **AND** polyethylene glycol **AND** bisacodyl **AND** bisacodyl    dextrose    dextrose    glucagon (human recombinant)    Magnesium Standard Dose Replacement - Follow Nurse / BPA Driven Protocol    melatonin    [COMPLETED] Insert  Peripheral IV **AND** sodium chloride    sodium chloride    sodium chloride    Assessment & Plan   Assessment and Plan:         Weakness    ASSESSMENT:  Acute kidney injury: Likely related to angiotensin receptor blocker and may be some cardiorenal syndrome specially with the presence of diastolic dysfunctions  Congestive heart failure with grade 1 diastolic dysfunction  History of hypertension  History of diabetes mellitus  Atrial fibrillation              PLAN :   Patient with GI as above, creatinine relatively unchanged today   Patient has significant edema and with some crackles in the lungs, has grade 1 diastolic dysfunctions. S/p 1X dose of Bumex yesterday, continue scheduled Lasix  Hyperkalemia resolved s/p Lokelma   Potassium level is getting better  Sodium level improving appropriately s/p dose of Urea yesterday.   As hemodynamics better will increase diuretics today  Remaining electrolytes/acid base stable  Manage blood sugar aggressively that also contributing to some hyponatremia   Blood pressure stable on decreased dose of Losartan, continue   Daily labs   We will continue to follow        Electronically signed by   Darrell Perez MD.   [facility] kidney consultant  [phone]  2024  11:23 EST       Electronically signed by Darrell Perez MD at 24 1614       Twyla Johnson MD at 24 0945              [facility] MEDICINE SERVICE  DAILY PROGRESS NOTE    NAME: Ap Roe  : 1/15/1928  MRN: [id number]      LOS: 7 days     PROVIDER OF SERVICE: Twyla Johnson MD    Chief Complaint: Weakness    Subjective:     Interval History:  History taken from: patient    Patient is otherwise doing well this morning.  Comfortable.  No acute overnight events.        Review of Systems:   Review of Systems   Constitutional: Negative.    Respiratory: Negative.     Cardiovascular: Negative.    Gastrointestinal: Negative.    Musculoskeletal: Negative.    Neurological: Negative.     Psychiatric/Behavioral: Negative.         Objective:     Vital Signs  Temp:  [97.5 °F (36.4 °C)-97.7 °F (36.5 °C)] 97.5 °F (36.4 °C)  Heart Rate:  [60-68] 68  Resp:  [16-20] 16  BP: (104-141)/(52-65) 141/65   Body mass index is 26.85 kg/m².    Physical Exam  Physical Exam  Constitutional:       General: He is awake.      Appearance: Normal appearance. He is well-developed and well-groomed.   HENT:      Head: Normocephalic and atraumatic.      Nose: Nose normal.      Mouth/Throat:      Mouth: Mucous membranes are moist.      Pharynx: Oropharynx is clear.   Eyes:      Extraocular Movements: Extraocular movements intact.      Conjunctiva/sclera: Conjunctivae normal.      Pupils: Pupils are equal, round, and reactive to light.   Cardiovascular:      Rate and Rhythm: Normal rate and regular rhythm.      Pulses: Normal pulses.      Heart sounds: Normal heart sounds.   Pulmonary:      Effort: Pulmonary effort is normal.      Breath sounds: Normal breath sounds.   Abdominal:      General: Abdomen is flat. Bowel sounds are normal.      Palpations: Abdomen is soft.   Musculoskeletal:         General: Normal range of motion.      Cervical back: Normal range of motion and neck supple.      Right lower leg: No edema.      Left lower leg: No edema.   Skin:     General: Skin is warm and dry.   Neurological:      General: No focal deficit present.      Mental Status: He is alert and oriented to person, place, and time. Mental status is at baseline.      Cranial Nerves: Cranial nerves 2-12 are intact.      Sensory: Sensation is intact.      Motor: Motor function is intact.   Psychiatric:         Mood and Affect: Mood normal.         Behavior: Behavior normal. Behavior is cooperative.         Thought Content: Thought content normal.         Judgment: Judgment normal.            Diagnostic Data    Results from last 7 days   Lab Units 12/04/24  0301 12/02/24  1717   WBC 10*3/mm3 5.67  --    HEMOGLOBIN g/dL 10.6*  --    HEMATOCRIT %  31.2*  --    PLATELETS 10*3/mm3 184  --    GLUCOSE mg/dL 175* 276*   CREATININE mg/dL 1.46* 1.54*   BUN mg/dL 34* 33*   SODIUM mmol/L 128* 123*   POTASSIUM mmol/L 4.8 5.3*   AST (SGOT) U/L  --  34   ALT (SGPT) U/L  --  16   ALK PHOS U/L  --  102   BILIRUBIN mg/dL  --  0.5   ANION GAP mmol/L 10.9 8.8       No radiology results for the last day      I reviewed the patient's new clinical results.    Assessment/Plan:     Active and Resolved Problems  Active Hospital Problems    Diagnosis  POA    **Weakness [R53.1]  Yes      Resolved Hospital Problems   No resolved problems to display.   Status post fall at home  Generalized weakness  Hypomagnesemia   Diabetes mellitus  Anemia of chronic disease  Atrial fibrillation  Hypertension  Acute confusional state  Hyponatremia, chronic--with worsening this hospital admission  Acute kidney injury       Noted laboratory studies.  Sodium is risen to 128.  Nephrology following.  Bumex dosage given yesterday.  Patient has now been resumed back on Lasix.  Echocardiogram conducted this morning.  Results currently pending.    Confusion is now resolved.  It appears it was likely secondary to sleep deprivation.  Patient is now off trazodone as well.    Given independence with ADLs prior to admission patient would likely benefit from acute inpatient rehab as well as up to 15 hours of therapy on a weekly basis.  Patient did have an increased functional status prior to admission after reviewing and/or discussing with patient's daughter who was present at bedside.  Patient was independent driving as well as managing his own medications at home.  He was otherwise doing well.  Given prolonged hospital course and daily deconditioning I would strongly advised patient to be discharged to an acute inpatient rehab facility.    While being at the acute inpatient rehab facility patient would benefit from daily labs as well as 3 hours of therapy on a daily basis.        VTE Prophylaxis:  Pharmacologic &  mechanical VTE prophylaxis orders are present.             Disposition Planning:     Barriers to Discharge: Insurance approval for rehab placement  Anticipated Date of Discharge: 2024  Place of Discharge: Rehab versus home pending above.      Time: 25 minutes     Code Status and Medical Interventions: No CPR (Do Not Attempt to Resuscitate); Full Support   Ordered at: 24 1024     Level Of Support Discussed With:    Next of Kin (If No Surrogate)     Code Status (Patient has no pulse and is not breathing):    No CPR (Do Not Attempt to Resuscitate)     Medical Interventions (Patient has pulse or is breathing):    Full Support       Signature: Electronically signed by Twyla Johnson MD, 24, 09:45 EST.  [facility] Hospitalist Team      Electronically signed by Twyla Johnson MD at 24 0948       Twyla Johnson MD at 24 0958              [facility] MEDICINE SERVICE  DAILY PROGRESS NOTE    NAME: Ap Roe  : 1/15/1928  MRN: [id number]      LOS: 6 days     PROVIDER OF SERVICE: Twyla Johnson MD    Chief Complaint: Weakness    Subjective:     Interval History:  History taken from: patient    Patient appears otherwise comfortable.  He is up and eating breakfast at the present time.  Wife and daughter present at bedside.  He slept well overnight.        Review of Systems:   Review of Systems   Constitutional: Negative.    Respiratory: Negative.     Cardiovascular: Negative.    Gastrointestinal: Negative.    Musculoskeletal: Negative.    Neurological: Negative.    Psychiatric/Behavioral: Negative.         Objective:     Vital Signs  Temp:  [97.3 °F (36.3 °C)-97.6 °F (36.4 °C)] 97.4 °F (36.3 °C)  Heart Rate:  [60-64] 60  Resp:  [16-18] 18  BP: (104-127)/(42-59) 110/42   Body mass index is 28.59 kg/m².    Physical Exam  Physical Exam  Constitutional:       General: He is awake.      Appearance: Normal appearance. He is well-developed and well-groomed.   HENT:      Head: Normocephalic  and atraumatic.      Nose: Nose normal.      Mouth/Throat:      Mouth: Mucous membranes are moist.      Pharynx: Oropharynx is clear.   Eyes:      Extraocular Movements: Extraocular movements intact.      Conjunctiva/sclera: Conjunctivae normal.      Pupils: Pupils are equal, round, and reactive to light.   Cardiovascular:      Rate and Rhythm: Normal rate and regular rhythm.      Pulses: Normal pulses.      Heart sounds: Normal heart sounds.   Pulmonary:      Effort: Pulmonary effort is normal.      Breath sounds: Normal breath sounds.   Abdominal:      General: Abdomen is flat. Bowel sounds are normal.      Palpations: Abdomen is soft.   Musculoskeletal:         General: Normal range of motion.      Cervical back: Normal range of motion and neck supple.      Right lower leg: No edema.      Left lower leg: No edema.   Skin:     General: Skin is warm and dry.   Neurological:      General: No focal deficit present.      Mental Status: He is alert and oriented to person, place, and time. Mental status is at baseline.      Cranial Nerves: Cranial nerves 2-12 are intact.      Sensory: Sensation is intact.      Motor: Motor function is intact.      Comments: Bilateral hand tremors noted.   Psychiatric:         Mood and Affect: Mood normal.         Behavior: Behavior normal. Behavior is cooperative.         Thought Content: Thought content normal.         Judgment: Judgment normal.            Diagnostic Data    Results from last 7 days   Lab Units 12/02/24  1717 11/30/24  0832   WBC 10*3/mm3  --  5.77   HEMOGLOBIN g/dL  --  9.9*   HEMATOCRIT %  --  29.1*   PLATELETS 10*3/mm3  --  164   GLUCOSE mg/dL 276* 141*   CREATININE mg/dL 1.54* 1.18   BUN mg/dL 33* 25*   SODIUM mmol/L 123* 130*   POTASSIUM mmol/L 5.3* 3.9   AST (SGOT) U/L 34 27   ALT (SGPT) U/L 16 14   ALK PHOS U/L 102 75   BILIRUBIN mg/dL 0.5 0.6   ANION GAP mmol/L 8.8 10.7       No radiology results for the last day      I reviewed the patient's new clinical  results.    Assessment/Plan:     Active and Resolved Problems  Active Hospital Problems    Diagnosis  POA    **Weakness [R53.1]  Yes      Resolved Hospital Problems   No resolved problems to display.     Status post fall at home  Generalized weakness  Hypomagnesemia   Diabetes mellitus  Anemia of chronic disease  Atrial fibrillation  Hypertension  Acute confusional state  Hyponatremia, chronic--with worsening this hospital admission  Acute kidney injury      Noted laboratory studies from yesterday.  Will consult nephrology.  Lasix remains on hold for now.  Discussed plan of care with patient's daughter present at bedside.  Patient would likely benefit from rehab at time of discharge.  He was fairly active prior to being admitted.    Etiology for hyponatremia remains unclear.  Will discuss with nephrology if CT imaging warranted.      VTE Prophylaxis:  Pharmacologic & mechanical VTE prophylaxis orders are present.             Disposition Planning:     Barriers to Discharge: Insurance approval, improvement of hyponatremia/acute kidney injury  Anticipated Date of Discharge: 12/4/24  Place of Discharge: Rehab      Time: 35 minutes     Code Status and Medical Interventions: No CPR (Do Not Attempt to Resuscitate); Full Support   Ordered at: 11/28/24 1024     Level Of Support Discussed With:    Next of Kin (If No Surrogate)     Code Status (Patient has no pulse and is not breathing):    No CPR (Do Not Attempt to Resuscitate)     Medical Interventions (Patient has pulse or is breathing):    Full Support       Signature: Electronically signed by Twyla Johnson MD, 12/03/24, 10:01 [facility] Hospitalist Team      Electronically signed by Twyla Johnson MD at 12/03/24 1004          Consult Notes (last 72 hours)        Darrell Perez MD at 12/03/24 1011        Consult Orders    1. Inpatient Nephrology Consult [[id number]] ordered by Twyla Johnson MD at 12/03/24 0739                   INITIAL CONSULT  NOTE      Patient Name: Ap Roe  : 1/15/1928  MRN: [id number]  Primary Care Physician: Elva Mota MD  Date of admission: 2024    Patient Care Team:  Elva Mota MD as PCP - General (Internal Medicine)        Reason for Consult:       Hyponatremia hyperkalemia and GI  Subjective   History of Present Illness:   Chief Complaint:   Chief Complaint   Patient presents with    Fall     HISTORY:  Ap Roe is a 89 y.o. male with past medical history of atrial fibrillation, diastolic heart failure, grade 1, history of diabetes, coronary artery disease, history of chronic hyponatremia,. who presents with fall as he landed on his knees.  With some hip pain.  Sodium level was 130 at the time of admission dropped to 123 today creatinine that was around 1.2 at baseline today 1.5.  Patient has significant peripheral edema.  Has 2.  Grade 1 diastolic dysfunctions.  Potassium level is also slightly on the high at 5.3.  Patient is extremely lethargic got a detailed history from patient daughter who is a physician and knows about her father condition in detail          Review of systems:  All other review of system unremarkable at this time  Constitutional: No fever, no chills, no lethargy, no weakness.  HEENT:  No headache, otalgia, itchy eyes, nasal discharge or sore throat.  Cardiac:  No chest pain, dyspnea, orthopnea or PND.  Chest:              No cough, phlegm or wheezing.  Abdomen:  No abdominal pain, nausea or vomiting.  Neuro:  No focal weakness, abnormal movements or seizure-like activity.  :   No hematuria, no pyuria, no dysuria, no flank pain.  ROS was otherwise negative except as mentioned in the [place].       Personal History:     Past Medical History:   Past Medical History:   Diagnosis Date    Arthritis     Diabetes 1.5, managed as type 2     Heart disease        Surgical History:      Past Surgical History:   Procedure Laterality Date    CHOLECYSTECTOMY      HAND SURGERY       MELANOMA WIDE LOCAL EXCISION Right     cheek    PACEMAKER IMPLANTATION      SKIN GRAFT      scalp       Family History: family history is not on file. Otherwise pertinent FHx was reviewed and unremarkable.     Social History:  reports that he has never smoked. He has never used smokeless tobacco. He reports that he does not drink alcohol and does not use drugs.    Medications:  Prior to Admission medications    Medication Sig Start Date End Date Taking? Authorizing Provider   amiodarone (PACERONE) 200 MG tablet Take 1 tablet by mouth Daily. 10/9/24  Yes Ayse Hawkins MD   Eliquis 5 MG tablet tablet Take 1 tablet by mouth Every 12 (Twelve) Hours. 10/5/24  Yes Ayse Hawkins MD   furosemide (LASIX) 20 MG tablet Take 1 tablet by mouth Daily.   Yes Ayse Hawkins MD   losartan (COZAAR) 100 MG tablet Take 1 tablet by mouth Daily. 10/4/24  Yes Ayse Hawkins MD   metFORMIN (GLUCOPHAGE) 1000 MG tablet Take 1 tablet by mouth 2 (Two) Times a Day With Meals.   Yes Ayse Hawkins MD   pioglitazone (ACTOS) 30 MG tablet Take 1 tablet by mouth Daily. 8/29/24  Yes Ayse Hawkins MD   propranolol (INDERAL) 80 MG tablet Take 1 tablet by mouth Daily. 10/8/24  Yes Ayse Hawkins MD   simvastatin (ZOCOR) 20 MG tablet Take 1 tablet by mouth Daily. 10/4/24  Yes Ayse Hawkins MD   tamsulosin (FLOMAX) 0.4 MG capsule 24 hr capsule Take 1 capsule by mouth Daily. 10/4/24  Yes Ayse Hawkins MD     Scheduled Meds:amiodarone, 200 mg, Oral, Daily  apixaban, 5 mg, Oral, Q12H  atorvastatin, 10 mg, Oral, Daily  [Held by provider] furosemide, 20 mg, Oral, Every Other Day  insulin lispro, 2-7 Units, Subcutaneous, 4x Daily AC & at Bedtime  losartan, 100 mg, Oral, Daily  pioglitazone, 30 mg, Oral, Daily  propranolol, 80 mg, Oral, Daily  sodium chloride, 10 mL, Intravenous, Q12H  tamsulosin, 0.4 mg, Oral, Daily  traZODone, 25 mg, Oral, Nightly      Continuous Infusions:   PRN Meds:   senna-docusate sodium **AND** polyethylene glycol **AND** bisacodyl **AND** bisacodyl    dextrose    dextrose    glucagon (human recombinant)    Magnesium Standard Dose Replacement - Follow Nurse / BPA Driven Protocol    melatonin    [COMPLETED] Insert Peripheral IV **AND** sodium chloride    sodium chloride    sodium chloride  Allergies:    Allergies   Allergen Reactions    Celebrex [Celecoxib] Hives       Objective   Exam:     Vital Signs  Temp:  [97.3 °F (36.3 °C)-97.6 °F (36.4 °C)] 97.4 °F (36.3 °C)  Heart Rate:  [60-64] 60  Resp:  [16-18] 18  BP: (104-127)/(42-59) 110/42  SpO2:  [92 %-100 %] 98 %  on   ;   Device (Oxygen Therapy): room air  Body mass index is 28.59 kg/m².  EXAM  General: Elderly white male in no acute distress.    Head:      Normocephalic and atraumatic.    Eyes:      PERRL/EOM intact, conjunctivae and sclerae clear without nystagmus.    Neck:      No masses, thyromegaly,  trachea central   Lungs:    Clear bilaterally to auscultation.    Heart:      Regular rate and rhythm, no murmur no gallop  Abd:        Soft, nontender, not distended, bowel sounds positive, no shifting dullness.  Msk:        No deformity or scoliosis noted of thoracic or lumbar spine.    Pulses:   Pulses normal in all 4 extremities.    Extremities:        No cyanosis or clubbing--+2 edema.    Neuro:    No focal deficits.   alert oriented x3  Skin:       Intact without lesions or rashes.    Psych:    Alert and cooperative; normal mood and affect; normal attention span       Results Review:  I have personally reviewed most recent Data :  BMP @LABRCNT(creatinine:10)  CBC    Results from last 7 days   Lab Units 11/30/24  0832 11/29/24  0044 11/27/24  0336 11/26/24  1732   WBC 10*3/mm3 5.77 5.09 5.94 6.39   HEMOGLOBIN g/dL 9.9* 10.3* 10.8* 11.3*   PLATELETS 10*3/mm3 164 200 183 190     CMP   Results from last 7 days   Lab Units 12/02/24  1717 11/30/24  0832 11/29/24  0044 11/28/24  0554 11/27/24  0336 11/26/24  1732   SODIUM  mmol/L 123* 130* 129* 132* 130* 132*   POTASSIUM mmol/L 5.3* 3.9 4.1 3.6 3.6 4.5   CHLORIDE mmol/L 95* 97* 94* 94* 92* 93*   CO2 mmol/L 19.2* 22.3 24.6 25.2 26.6 24.7   BUN mg/dL 33* 25* 24* 21 21 23   CREATININE mg/dL 1.54* 1.18 1.38* 1.22 1.26 1.27   GLUCOSE mg/dL 276* 141* 108* 136* 173* 148*   ALBUMIN g/dL 3.4* 3.3*  --   --  3.7 4.0   BILIRUBIN mg/dL 0.5 0.6  --   --  0.8 0.8   ALK PHOS U/L 102 75  --   --  85 101   AST (SGOT) U/L 34 27  --   --  29 29   ALT (SGPT) U/L 16 14  --   --  16 16     ABG      XR Hip With or Without Pelvis 2 - 3 View Left    Result Date: 11/26/2024  Impression: Negative for acute osseous abnormality. Electronically Signed: Damian Keen MD  11/26/2024 5:50 PM EST  Workstation ID: [id number]    XR Knee 1 or 2 View Bilateral    Result Date: 11/26/2024  Impression: 1.Degenerative changes in both knees. 2.No fractures are identified. 3.Soft tissue swelling laterally on the left. Electronically Signed: Jose Cassidy MD  11/26/2024 4:18 PM EST  Workstation ID: [id number]           Assessment & Plan   Assessment and Plan:         Weakness    ASSESSMENT:  Acute kidney injury: Likely related to angiotensin receptor blocker and may be some cardiorenal syndrome specially with the presence of diastolic dysfunctions  Congestive heart failure with grade 1 diastolic dysfunction  History of hypertension  History of diabetes mellitus  Atrial fibrillation          PLAN :     Patient started on Bumex 1 mg x 1 as patient has significant edema and with some crackles in the lungs has grade 1 diastolic dysfunctions  Hyponatremia hyperkalemia some increasing creatinine also related to cardiorenal syndrome with some volume overload  Will give a dose of Lokelma for hyperkalemia  Follow-up with repeat lab around 4 PM  If sodium level improving okay to be discharged  1 dose of urea to improve urine output and sodium  Manage blood sugar aggressively that also contributing to some hyponatremia corrected sodium may be  around 1 25-1 26 today  I am decreasing losartan to 50 mg a day as blood pressure is slightly soft and patient potassium is high and creatinine is high  Thank you for letting me participate      Darrell Perez MD  [facility]  12/3/2024  10:11 EST        Electronically signed by Darrell Perez MD at 12/03/24 5828

## 2024-12-06 NOTE — THERAPY TREATMENT NOTE
Subjective: Pt agreeable to therapeutic plan of care. Pt with family at bedside, present throughout session. Pt sitting up in chair upon arrival.     Cognition: oriented to Person, Place, Time, and Situation    Objective:     Precautions - high falls risk, [place]    Bed Mobility: N/A or Not attempted.   Functional Transfers: CGA, Min-A, and with rolling walker, verbal cues throughout for hand placement for safety      Balance: supported, static, and standing CGA  Functional Ambulation: CGA and with rolling walker    Daughter previously assisting pt with ADLs prior to arrival, set up for oral hygiene     Therapeutic Exercise - 5 Reps STS from chair with CGA-min A with rest breaks between each rep, verbal cues for hand placement for safety    Static/dynamic standing ~2 minutes with CGA with RW demo posterior lean upon standing, able to weight shift appropriately without cues     Vitals: WNL    Pain: 0 VAS  Location: N/A  Interventions for pain: N/A  Education: Provided education on the importance of mobility in the acute care setting, Verbal/Tactile Cues, ADL training, Transfer Training, and Energy conservation strategies, home environmental modifications, education regarding transitioning home with aging in place       Assessment: Ap Roe presents with ADL impairments affecting function including balance, endurance / activity tolerance, postural / trunk control, range of motion (ROM), and strength. Demonstrated functioning below baseline abilities indicate the need for continued skilled intervention while inpatient. Pt pleasant and cooperative with care, eager to work with therapy. Pt previously completed ADLs with daughter prior to arrival, required set up for oral hygiene. Pt comes to standing with CGA-min A throughout session, various reps of functional transfers, requiring reminders of hand placement with each transfer. Pt completes series of STS requiring rest breaks between each rep. Pt demo poor activity  "tolerance needed for ADL routine, completing static/dynamic standing with CGA however unable to tolerate >2 minutes without rest break. OT changing dc recommendation to SNF as pt likely not able to tolerate high intensity needed to participate at IRF. OT will follow and progress as appropriate. Tolerating session today without incident. Will continue to follow and progress as tolerated.     Plan/Recommendations:   Moderate Intensity Therapy recommended post-acute care. This is recommended as therapy feels the patient would require 3-4 days per week and wouldn't tolerate \"3 hour daily\" rehab intensity. SNF would be the preferred choice. If the patient does not agree to SNF, arrange HH or OP depending on home bound status. If patient is medically complex, consider LTACH.. Pt requires no DME at discharge.     Pt desires Inpatient Rehabilitation placement at discharge. Pt cooperative; agreeable to therapeutic recommendations and plan of care.     Modified [place]: N/A = No pre-op stroke/TIA    Post-Tx Position: Up in Chair, Alarms activated, and Call light and personal items within reach  PPE: gloves    Therapy Charges for Today       Code Description Service Date Service Provider Modifiers Qty    24886864038 HC OT THERAPEUTIC ACT EA 15 MIN 12/6/2024 Esperanza Cason OT GO 1    87992174175 HC OT SELF CARE/MGMT/TRAIN EA 15 MIN 12/6/2024 Esperanza Cason OT GO 2           Time Calculation- OT       Row Name 12/06/24 1459             Time Calculation- OT    OT Start Time 0838  -MS      OT Stop Time 0923  -MS      OT Time Calculation (min) 45 min  -MS      Total Timed Code Minutes- OT 45 minute(s)  -MS      OT Received On 12/06/24  -MS      OT - Next Appointment 12/09/24  -MS                User Key  (r) = Recorded By, (t) = Taken By, (c) = Cosigned By      Initials Name Provider Type    Esperanza Hidalgo OT Occupational Therapist                    "

## 2024-12-07 LAB
ANION GAP SERPL CALCULATED.3IONS-SCNC: 10.6 MMOL/L (ref 5–15)
BUN SERPL-MCNC: 38 MG/DL (ref 8–23)
BUN/CREAT SERPL: 26.6 (ref 7–25)
CALCIUM SPEC-SCNC: 9.2 MG/DL (ref 8.2–9.6)
CHLORIDE SERPL-SCNC: 94 MMOL/L (ref 98–107)
CO2 SERPL-SCNC: 23.4 MMOL/L (ref 22–29)
CREAT SERPL-MCNC: 1.43 MG/DL (ref 0.76–1.27)
EGFRCR SERPLBLD CKD-EPI 2021: 44.8 ML/MIN/1.73
GLUCOSE BLDC GLUCOMTR-MCNC: 181 MG/DL (ref 70–105)
GLUCOSE BLDC GLUCOMTR-MCNC: 186 MG/DL (ref 70–105)
GLUCOSE BLDC GLUCOMTR-MCNC: 219 MG/DL (ref 70–105)
GLUCOSE BLDC GLUCOMTR-MCNC: 232 MG/DL (ref 70–105)
GLUCOSE SERPL-MCNC: 212 MG/DL (ref 65–99)
MAGNESIUM SERPL-MCNC: 2 MG/DL (ref 1.7–2.3)
POTASSIUM SERPL-SCNC: 5 MMOL/L (ref 3.5–5.2)
SODIUM SERPL-SCNC: 128 MMOL/L (ref 136–145)

## 2024-12-07 PROCEDURE — 82948 REAGENT STRIP/BLOOD GLUCOSE: CPT | Performed by: PHYSICIAN ASSISTANT

## 2024-12-07 PROCEDURE — 83735 ASSAY OF MAGNESIUM: CPT | Performed by: INTERNAL MEDICINE

## 2024-12-07 PROCEDURE — 80048 BASIC METABOLIC PNL TOTAL CA: CPT | Performed by: INTERNAL MEDICINE

## 2024-12-07 PROCEDURE — 97110 THERAPEUTIC EXERCISES: CPT

## 2024-12-07 PROCEDURE — 97116 GAIT TRAINING THERAPY: CPT

## 2024-12-07 PROCEDURE — 63710000001 INSULIN LISPRO (HUMAN) PER 5 UNITS: Performed by: PHYSICIAN ASSISTANT

## 2024-12-07 PROCEDURE — 82948 REAGENT STRIP/BLOOD GLUCOSE: CPT

## 2024-12-07 PROCEDURE — 63710000001 INSULIN GLARGINE PER 5 UNITS: Performed by: INTERNAL MEDICINE

## 2024-12-07 RX ORDER — BUMETANIDE 0.25 MG/ML
1 INJECTION, SOLUTION INTRAMUSCULAR; INTRAVENOUS ONCE
Status: DISCONTINUED | OUTPATIENT
Start: 2024-12-07 | End: 2024-12-07

## 2024-12-07 RX ORDER — BUMETANIDE 1 MG/1
1 TABLET ORAL ONCE
Status: COMPLETED | OUTPATIENT
Start: 2024-12-07 | End: 2024-12-07

## 2024-12-07 RX ADMIN — PIOGLITAZONE 30 MG: 30 TABLET ORAL at 08:41

## 2024-12-07 RX ADMIN — INSULIN LISPRO 3 UNITS: 100 INJECTION, SOLUTION INTRAVENOUS; SUBCUTANEOUS at 17:25

## 2024-12-07 RX ADMIN — INSULIN LISPRO 2 UNITS: 100 INJECTION, SOLUTION INTRAVENOUS; SUBCUTANEOUS at 20:14

## 2024-12-07 RX ADMIN — APIXABAN 5 MG: 5 TABLET, FILM COATED ORAL at 20:14

## 2024-12-07 RX ADMIN — INSULIN LISPRO 3 UNITS: 100 INJECTION, SOLUTION INTRAVENOUS; SUBCUTANEOUS at 12:35

## 2024-12-07 RX ADMIN — AMIODARONE HYDROCHLORIDE 200 MG: 200 TABLET ORAL at 08:41

## 2024-12-07 RX ADMIN — APIXABAN 5 MG: 5 TABLET, FILM COATED ORAL at 08:42

## 2024-12-07 RX ADMIN — Medication 15 G: at 08:45

## 2024-12-07 RX ADMIN — ATORVASTATIN CALCIUM 10 MG: 10 TABLET ORAL at 08:42

## 2024-12-07 RX ADMIN — TAMSULOSIN HYDROCHLORIDE 0.4 MG: 0.4 CAPSULE ORAL at 08:42

## 2024-12-07 RX ADMIN — INSULIN GLARGINE-YFGN 10 UNITS: 100 INJECTION, SOLUTION SUBCUTANEOUS at 12:35

## 2024-12-07 RX ADMIN — INSULIN LISPRO 2 UNITS: 100 INJECTION, SOLUTION INTRAVENOUS; SUBCUTANEOUS at 08:42

## 2024-12-07 RX ADMIN — BUMETANIDE 1 MG: 1 TABLET ORAL at 11:00

## 2024-12-07 NOTE — PROGRESS NOTES
PROGRESS NOTE      Patient Name: Ap Roe  : 1/15/1928  MRN: [id number]  Primary Care Physician: Elva Mota MD  Date of admission: 2024    Patient Care Team:  Elva Mota MD as PCP - General (Internal Medicine)        Subjective   Subjective:     Patient seen and examined   NAD noted  Creatinine essentially unchanged    Review of systems:  ROS negative      Allergies:    Allergies   Allergen Reactions    Celebrex [Celecoxib] Hives       Objective   Exam:     Vital Signs  Temp:  [97.3 °F (36.3 °C)-98.3 °F (36.8 °C)] 97.6 °F (36.4 °C)  Heart Rate:  [66-77] 74  Resp:  [15-20] 20  BP: (104-118)/(41-69) 118/58  SpO2:  [91 %-98 %] 97 %  on   ;   Device (Oxygen Therapy): room air  Body mass index is 28.12 kg/m².    General:   male in no acute distress.    Head:      Normocephalic and atraumatic.    Eyes:      PERRL/EOM intact, conjunctivae and sclerae clear without nystagmus.    Neck:      No masses, thyromegaly,  trachea central with normal respiratory effort   Lungs:    Clear bilaterally to auscultation.    Heart:      Regular rate and rhythm, no murmur no gallop  Abd:        Soft, nontender, not distended, bowel sounds positive, no shifting dullness   Pulses:   Pulses palpable  Extr:        No cyanosis or clubbing--+edema.    Neuro:    No focal deficits.   alert oriented x3  Skin:       Intact without lesions or rashes.    Psych:    Alert and cooperative; normal mood and affect; .      Results Review:  I have personally reviewed most recent Data :  CBC    Results from last 7 days   Lab Units 24  1014 24  0301   WBC 10*3/mm3 6.03 5.67   HEMOGLOBIN g/dL 10.6* 10.6*   PLATELETS 10*3/mm3 188 184     CMP   Results from last 7 days   Lab Units 24  0735 24  0943 24  0301 24  1717   SODIUM mmol/L 127* 132* 128* 123*   POTASSIUM mmol/L 4.3 4.0 4.8 5.3*   CHLORIDE mmol/L 94* 105 95* 95*   CO2 mmol/L 22.5 19.1* 22.1 19.2*   BUN mg/dL 32* 27* 34* 33*   CREATININE  mg/dL 1.46* 1.09 1.46* 1.54*   GLUCOSE mg/dL 169* 181* 175* 276*   ALBUMIN g/dL  --  2.6*  --  3.4*   BILIRUBIN mg/dL  --  0.5  --  0.5   ALK PHOS U/L  --  71  --  102   AST (SGOT) U/L  --  35  --  34   ALT (SGPT) U/L  --  13  --  16     ABG      No radiology results for the last day    Results for orders placed during the hospital encounter of 11/26/24    Adult Transthoracic Echo Complete W/ Cont if Necessary Per Protocol    Interpretation Summary    Left ventricular systolic function is low normal. Left ventricular ejection fraction appears to be 51 - 55%.    Left ventricular diastolic function is consistent with (grade Ia w/high LAP) impaired relaxation.    The left atrial cavity is moderately dilated.    Left atrial volume is moderately increased.    There is moderate calcification of the aortic valve.    Estimated right ventricular systolic pressure from tricuspid regurgitation is normal (<35 mmHg).    Scheduled Meds:amiodarone, 200 mg, Oral, Daily  apixaban, 5 mg, Oral, Q12H  atorvastatin, 10 mg, Oral, Daily  bumetanide, 1 mg, Oral, Once  furosemide, 20 mg, Oral, Daily  insulin lispro, 2-7 Units, Subcutaneous, 4x Daily AC & at Bedtime  losartan, 25 mg, Oral, Daily  pioglitazone, 30 mg, Oral, Daily  propranolol, 80 mg, Oral, Daily  sodium chloride, 10 mL, Intravenous, Q12H  tamsulosin, 0.4 mg, Oral, Daily  [START ON 12/8/2024] Urea, 15 g, Oral, Daily      Continuous Infusions:   PRN Meds:  senna-docusate sodium **AND** polyethylene glycol **AND** bisacodyl **AND** bisacodyl    dextrose    dextrose    glucagon (human recombinant)    Magnesium Standard Dose Replacement - Follow Nurse / BPA Driven Protocol    melatonin    [COMPLETED] Insert Peripheral IV **AND** sodium chloride    sodium chloride    sodium chloride    Assessment & Plan   Assessment and Plan:         Weakness    ASSESSMENT:  Acute kidney injury: Likely related to angiotensin receptor blocker and may be some cardiorenal syndrome specially with the  presence of diastolic dysfunctions  Congestive heart failure with grade 1 diastolic dysfunction  History of hypertension  History of diabetes mellitus  Atrial fibrillation    PLAN :     Patient with acute kidney injury which is improving at this time  Patient edema that was getting better still developing again.  Specially with some drop in the sodium and mild acidosis normal potassium all consistent with some cardiorenal syndrome  Increase to 1 times a day 1 extra dose of Bumex given we will continue Lasix  Lasix 20 mg a day given as outpatient.  Continue Lasix at 20 mg a day   remaining electrolytes and acid-base acceptable  Manage blood sugar aggressively that also contributing to some hyponatremia   Blood pressure stable o will decrease losartan to 25 mg a day  Daily labs   We will continue to follow        Electronically signed by   Darrell Perez MD.   [facility] kidney consultant  [phone]  12/7/2024  09:39 EST

## 2024-12-07 NOTE — PLAN OF CARE
Goal Outcome Evaluation:  Plan of Care Reviewed With: patient        Progress: improving  Outcome Evaluation: Pt resting this shift, denies any pain. Pt able to walk to and from bathroom with stand by assist and walker. Pt has been sitting in recliner chair all day, family at bedside and very supportive. Mag, Sodium and kidney function are stable. Pt was started on Lantus r/t elevated blood sugars. Pt coccyx is red, but blanchable, this nurse placed waffle cushion in recliner chair and advised pt that we needed to turn him throughout the night to prevent bed sores. Pt understood. Call light within reach, chair alarm in place.

## 2024-12-07 NOTE — PROGRESS NOTES
[facility] MEDICINE SERVICE  DAILY PROGRESS NOTE    NAME: Ap Roe  : 1/15/1928  MRN: [id number]      LOS: 10 days     PROVIDER OF SERVICE: Ryann Bond MD    Chief Complaint: Weakness    Subjective:     Interval History:    Patient seen and evaluated at bedside.   Daughter at the bedside.  H&P, consults, labs and imaging reviewed.  Patient denies chest pain abdominal pain nausea vomiting or diarrhea  Treatment plan discussed with patient. All questions addressed.     Review of Systems:   All 21 ROS were negative except mentioned above.    Objective:     Vital Signs  Temp:  [97.3 °F (36.3 °C)-98.3 °F (36.8 °C)] 97.6 °F (36.4 °C)  Heart Rate:  [66-77] 74  Resp:  [15-20] 20  BP: (104-118)/(41-69) 118/58   Body mass index is 28.12 kg/m².    Physical Exam   General: No acute distress, appears stated age  Neuro: Awake and alert, oriented x3, no focal deficits appreciated  Head: atraumatic, normocephalic  HEENT: EOMI, anicteric, normal sclera and conjunctivae, moist mucus membranes  Neck: supple, no lymphadenopathy  CV: iRR R, soft heart sounds, no murmurs appreciated, 2+ peripheral edema  Pulm: Decreased breath sounds, no increased work of breathing, no adventitious sounds  Abd: Soft, nontender, nondistended  Skin: Warm, dry and intact  Psych: Appropriate mood and affect    Scheduled Meds   amiodarone, 200 mg, Oral, Daily  apixaban, 5 mg, Oral, Q12H  atorvastatin, 10 mg, Oral, Daily  furosemide, 20 mg, Oral, Daily  insulin lispro, 2-7 Units, Subcutaneous, 4x Daily AC & at Bedtime  losartan, 25 mg, Oral, Daily  pioglitazone, 30 mg, Oral, Daily  propranolol, 80 mg, Oral, Daily  sodium chloride, 10 mL, Intravenous, Q12H  tamsulosin, 0.4 mg, Oral, Daily  [START ON 2024] Urea, 15 g, Oral, Daily       PRN Meds     senna-docusate sodium **AND** polyethylene glycol **AND** bisacodyl **AND** bisacodyl    dextrose    dextrose    glucagon (human recombinant)    Magnesium Standard Dose Replacement - Follow  Nurse / BPA Driven Protocol    melatonin    [COMPLETED] Insert Peripheral IV **AND** sodium chloride    sodium chloride    sodium chloride   Infusions         Diagnostic Data    Results from last 7 days   Lab Units 12/07/24  0856 12/06/24  0735 12/05/24  1014 12/05/24  0943   WBC 10*3/mm3  --   --  6.03  --    HEMOGLOBIN g/dL  --   --  10.6*  --    HEMATOCRIT %  --   --  30.9*  --    PLATELETS 10*3/mm3  --   --  188  --    GLUCOSE mg/dL 212*   < >  --  181*   CREATININE mg/dL 1.43*   < >  --  1.09   BUN mg/dL 38*   < >  --  27*   SODIUM mmol/L 128*   < >  --  132*   POTASSIUM mmol/L 5.0   < >  --  4.0   AST (SGOT) U/L  --   --   --  35   ALT (SGPT) U/L  --   --   --  13   ALK PHOS U/L  --   --   --  71   BILIRUBIN mg/dL  --   --   --  0.5   ANION GAP mmol/L 10.6   < >  --  7.9    < > = values in this interval not displayed.       No radiology results for the last day    Interval results reviewed.    Assessment/Plan:   Acute CHF exacerbation  GI on ckd3  Hyponatremia  Hypertension  Diabetes  Atrial fibrillation  Status post fall at home  Anemia of chronic diseases  Generalized weakness    Patient was given Bumex one-time dose by renal and continue Lasix.  Renal function improving  Status post albumin.    Hyponatremia most likely pseudohyponatremia due to hyperglycemia and CHF exacerbation.    Losartan dose decreased to 25 mg a day secondary to hypotension and borderline high nl potassium.    Blood sugar running high, will DC pioglitazone as it is contributing to patient's heart failure and start on Lantus 10 units daily.    Patient would benefit from acute inpatient rehab facility, patient lives alone and able to perform ADLs by himself prior to hospitalization.    Appeal in process.    Treatment plan discussed with RN.     VTE Prophylaxis:  Pharmacologic VTE prophylaxis orders are present.         Code status is   Code Status and Medical Interventions: No CPR (Do Not Attempt to Resuscitate); Full Support   Ordered  at: 11/28/24 1024     Level Of Support Discussed With:    Next of Kin (If No Surrogate)     Code Status (Patient has no pulse and is not breathing):    No CPR (Do Not Attempt to Resuscitate)     Medical Interventions (Patient has pulse or is breathing):    Full Support       Plan for disposition:     Barriers to Discharge: awaiting placement  Anticipated Date of Discharge: 12/9/24  Place of Discharge: rehab      Time: 40 minutes     Signature: Electronically signed by Ryann Bond MD, 12/07/24, 11:15 [facility].  [facility]

## 2024-12-07 NOTE — PLAN OF CARE
Assessment: Ap Roe presents with functional mobility impairments which indicate the need for skilled intervention. Tolerating session today without incident. Patient very cooperative with therapy. Usually doesn't use an AD and is ind at home. Today pt req use of rwx and presents with slowed mel and unable to take on challenges. Prefers to go to acute rehab but waiting on appeal. Will continue to follow and progress as tolerated.

## 2024-12-07 NOTE — THERAPY TREATMENT NOTE
"Subjective: Pt agreeable to therapeutic plan of care. Patient had just finished getting cleaned up in bathroom but agreed to PT    Objective:     Precautions - falls    Bed mobility - N/A or Not attempted.  Transfers - CGA and with rolling walker  Ambulation - 30 feet Min-A and with rolling walker    Therapeutic Exercise - 10 Reps B LE AROM supported sitting / chair    Vitals: WNL    Pain: sl pain in L hip    Education: Provided education on the importance of mobility in the acute care setting, Verbal/Tactile Cues, Transfer Training, and Gait Training    Assessment: Ap Roe presents with functional mobility impairments which indicate the need for skilled intervention. Tolerating session today without incident. Patient very cooperative with therapy. Usually doesn't use an AD and is ind at home. Today pt req use of rwx and presents with slowed mel and unable to take on challenges. Prefers to go to acute rehab but waiting on appeal. Will continue to follow and progress as tolerated.     Plan/Recommendations:   If medically appropriate, Moderate Intensity Therapy recommended post-acute care. This is recommended as therapy feels the patient would require 3-4 days per week and wouldn't tolerate \"3 hour daily\" rehab intensity. SNF would be the preferred choice. If the patient does not agree to SNF, arrange HH or OP depending on home bound status. If patient is medically complex, consider LTACH. Pt requires no DME at discharge.     Pt desires Inpatient Rehabilitation placement at discharge. Pt cooperative; agreeable to therapeutic recommendations and plan of care.         Basic Mobility 6-click:  Rollin = Total, A lot = 2, A little = 3; 4 = None  Supine>Sit:   1 = Total, A lot = 2, A little = 3; 4 = None   Sit>Stand with arms:  1 = Total, A lot = 2, A little = 3; 4 = None  Bed>Chair:   1 = Total, A lot = 2, A little = 3; 4 = None  Ambulate in room:  1 = Total, A lot = 2, A little = 3; 4 = None  3-5 " Steps with railin = Total, A lot = 2, A little = 3; 4 = None  Score: 16    Post-Tx Position: Up in Chair, Alarms activated, and Call light and personal items within reach  PPE: gloves    Therapy Charges for Today       Code Description Service Date Service Provider Modifiers Qty    44050802005  GAIT TRAINING EA 15 MIN 2024 America Rock PTA GP 1    60264180120  PT THER PROC EA 15 MIN 2024 America Rock, PTA GP 1           PT Charges       Row Name 24 1609             Time Calculation    Start Time 1525  -      Stop Time 1546  -      Time Calculation (min) 21 min  -      PT Received On 24  -      PT - Next Appointment 24  -         Time Calculation- PT    Total Timed Code Minutes- PT 21 minute(s)  -                User Key  (r) = Recorded By, (t) = Taken By, (c) = Cosigned By      Initials Name Provider Type    America Francois PTA Physical Therapist Assistant

## 2024-12-08 LAB
ANION GAP SERPL CALCULATED.3IONS-SCNC: 12.2 MMOL/L (ref 5–15)
BUN SERPL-MCNC: 45 MG/DL (ref 8–23)
BUN/CREAT SERPL: 31.7 (ref 7–25)
CALCIUM SPEC-SCNC: 9.3 MG/DL (ref 8.2–9.6)
CHLORIDE SERPL-SCNC: 93 MMOL/L (ref 98–107)
CO2 SERPL-SCNC: 23.8 MMOL/L (ref 22–29)
CREAT SERPL-MCNC: 1.42 MG/DL (ref 0.76–1.27)
DEPRECATED RDW RBC AUTO: 50.6 FL (ref 37–54)
EGFRCR SERPLBLD CKD-EPI 2021: 45.2 ML/MIN/1.73
ERYTHROCYTE [DISTWIDTH] IN BLOOD BY AUTOMATED COUNT: 14.7 % (ref 12.3–15.4)
GLUCOSE BLDC GLUCOMTR-MCNC: 157 MG/DL (ref 70–105)
GLUCOSE BLDC GLUCOMTR-MCNC: 192 MG/DL (ref 70–105)
GLUCOSE BLDC GLUCOMTR-MCNC: 204 MG/DL (ref 70–105)
GLUCOSE BLDC GLUCOMTR-MCNC: 206 MG/DL (ref 70–105)
GLUCOSE SERPL-MCNC: 227 MG/DL (ref 65–99)
HCT VFR BLD AUTO: 29.8 % (ref 37.5–51)
HGB BLD-MCNC: 10 G/DL (ref 13–17.7)
MAGNESIUM SERPL-MCNC: 1.9 MG/DL (ref 1.7–2.3)
MCH RBC QN AUTO: 31.7 PG (ref 26.6–33)
MCHC RBC AUTO-ENTMCNC: 33.6 G/DL (ref 31.5–35.7)
MCV RBC AUTO: 94.6 FL (ref 79–97)
PLATELET # BLD AUTO: 185 10*3/MM3 (ref 140–450)
PMV BLD AUTO: 8.1 FL (ref 6–12)
POTASSIUM SERPL-SCNC: 5.2 MMOL/L (ref 3.5–5.2)
RBC # BLD AUTO: 3.15 10*6/MM3 (ref 4.14–5.8)
SODIUM SERPL-SCNC: 129 MMOL/L (ref 136–145)
WBC NRBC COR # BLD AUTO: 8.49 10*3/MM3 (ref 3.4–10.8)

## 2024-12-08 PROCEDURE — 63710000001 INSULIN GLARGINE PER 5 UNITS: Performed by: INTERNAL MEDICINE

## 2024-12-08 PROCEDURE — 82948 REAGENT STRIP/BLOOD GLUCOSE: CPT | Performed by: PHYSICIAN ASSISTANT

## 2024-12-08 PROCEDURE — 83735 ASSAY OF MAGNESIUM: CPT | Performed by: INTERNAL MEDICINE

## 2024-12-08 PROCEDURE — 80048 BASIC METABOLIC PNL TOTAL CA: CPT | Performed by: INTERNAL MEDICINE

## 2024-12-08 PROCEDURE — 85027 COMPLETE CBC AUTOMATED: CPT | Performed by: INTERNAL MEDICINE

## 2024-12-08 PROCEDURE — 63710000001 INSULIN LISPRO (HUMAN) PER 5 UNITS: Performed by: PHYSICIAN ASSISTANT

## 2024-12-08 PROCEDURE — 82948 REAGENT STRIP/BLOOD GLUCOSE: CPT

## 2024-12-08 RX ORDER — FUROSEMIDE 20 MG/1
20 TABLET ORAL
Status: DISCONTINUED | OUTPATIENT
Start: 2024-12-08 | End: 2024-12-10

## 2024-12-08 RX ORDER — SODIUM CHLORIDE 1 G/1
0.5 TABLET ORAL 2 TIMES DAILY WITH MEALS
Status: DISCONTINUED | OUTPATIENT
Start: 2024-12-08 | End: 2024-12-10 | Stop reason: HOSPADM

## 2024-12-08 RX ADMIN — FUROSEMIDE 20 MG: 20 TABLET ORAL at 08:11

## 2024-12-08 RX ADMIN — ATORVASTATIN CALCIUM 10 MG: 10 TABLET ORAL at 08:10

## 2024-12-08 RX ADMIN — INSULIN LISPRO 2 UNITS: 100 INJECTION, SOLUTION INTRAVENOUS; SUBCUTANEOUS at 18:31

## 2024-12-08 RX ADMIN — TAMSULOSIN HYDROCHLORIDE 0.4 MG: 0.4 CAPSULE ORAL at 08:11

## 2024-12-08 RX ADMIN — INSULIN LISPRO 2 UNITS: 100 INJECTION, SOLUTION INTRAVENOUS; SUBCUTANEOUS at 08:10

## 2024-12-08 RX ADMIN — APIXABAN 5 MG: 5 TABLET, FILM COATED ORAL at 20:42

## 2024-12-08 RX ADMIN — FUROSEMIDE 20 MG: 20 TABLET ORAL at 18:31

## 2024-12-08 RX ADMIN — SENNOSIDES AND DOCUSATE SODIUM 2 TABLET: 50; 8.6 TABLET ORAL at 20:41

## 2024-12-08 RX ADMIN — SODIUM CHLORIDE 0.5 G: 1 TABLET ORAL at 18:31

## 2024-12-08 RX ADMIN — SODIUM CHLORIDE 0.5 G: 1 TABLET ORAL at 13:23

## 2024-12-08 RX ADMIN — INSULIN LISPRO 3 UNITS: 100 INJECTION, SOLUTION INTRAVENOUS; SUBCUTANEOUS at 20:42

## 2024-12-08 RX ADMIN — SODIUM ZIRCONIUM CYCLOSILICATE 10 G: 10 POWDER, FOR SUSPENSION ORAL at 13:25

## 2024-12-08 RX ADMIN — AMIODARONE HYDROCHLORIDE 200 MG: 200 TABLET ORAL at 08:11

## 2024-12-08 RX ADMIN — APIXABAN 5 MG: 5 TABLET, FILM COATED ORAL at 08:10

## 2024-12-08 RX ADMIN — INSULIN GLARGINE-YFGN 10 UNITS: 100 INJECTION, SOLUTION SUBCUTANEOUS at 08:11

## 2024-12-08 RX ADMIN — Medication 15 G: at 08:11

## 2024-12-08 RX ADMIN — INSULIN LISPRO 3 UNITS: 100 INJECTION, SOLUTION INTRAVENOUS; SUBCUTANEOUS at 13:22

## 2024-12-08 NOTE — PLAN OF CARE
Goal Outcome Evaluation:  Plan of Care Reviewed With: patient, child        Progress: improving  Outcome Evaluation: Pt resting this shift, denies any pain. Pt has been sitting in recliner chair most of the day. Pt has been having constipation/impaction issues today. Pt appeared very exhausted before and after trying to have a bm, this nurse noted bm trying to make it's way out of pt rectum but pt did not have the strength to release. This nurse with assist x3, had pt placed back in bed as he was very exhausted from pushing and almost fell twice. This nurse gave pt some time rest and within the hour tried to disimpact pt, this nurse removed medium size solid stool. Spoke to daughter about using the bsc until he's had a good bm, as he was having a hard time walking back and forth to the bathroom. Pt tried 2 more times and finally had a xl solid bm. NA is improving, was started on Sodium  Chloride tablets, Lasix. This nurse had to hold Losartan and Propanolol r/t low b/p. There has been discussion between Daughter and Dr. Perez about lowering pt Propanolol as pt uses this for his tremors. Nephrology MD has not decided on medication change. Pt sitting in recliner chair, chair alarm in place, daughter at bedside, call light within reach. Pt also refuses telemetry and pulls iv's. MD's are aware, NNO. Pending acceptance to SNF.

## 2024-12-08 NOTE — CASE MANAGEMENT/SOCIAL WORK
Continued Stay Note  [facility]     Patient Name: Ap Roe  MRN: [id number]  Today's Date: 12/8/2024    Admit Date: 11/26/2024    Plan: Appeal denied for [facility]. [facility] acceped. Precert started 12/6; still pending. PASRR approved. From home with spouse.   Discharge Plan       Row Name 12/08/24 1534       Plan    Plan Appeal denied for [facility]. Ray acceped. Precert started 12/6; still pending. PASRR approved. From home with spouse.    Plan Comments CM received message from [facility] liaison that appeal had been denied. CM requested [facility] to check status of precert for Ray. Precert remains pending at this time.     Sisi Faith RN BSN  Weekend   [facility]  Phone: [phone]  Fax: [phone]

## 2024-12-08 NOTE — PROGRESS NOTES
[facility] MEDICINE SERVICE  DAILY PROGRESS NOTE    NAME: Ap Roe  : 1/15/1928  MRN: [id number]      LOS: 11 days     PROVIDER OF SERVICE: Ryann Bond MD    Chief Complaint: Weakness    Subjective:     Interval History:    Patient seen and evaluated at bedside.   Wife and daughter at the bedside.  Explained patient and family about changes in his antihyperglycemic's.  Labs discussed  Treatment plan discussed with patient. All questions addressed.     Review of Systems:   All 21 ROS were negative except mentioned above.    Objective:     Vital Signs  Temp:  [97.3 °F (36.3 °C)-98.6 °F (37 °C)] 98.6 °F (37 °C)  Heart Rate:  [66-78] 77  Resp:  [16-22] 16  BP: (100-131)/(48-61) 111/52   Body mass index is 28.12 kg/m².    Physical Exam   General: No acute distress, appears stated age  Neuro: Awake and alert, oriented x3, no focal deficits appreciated  Head: atraumatic, normocephalic  HEENT: EOMI, anicteric, normal sclera and conjunctivae, moist mucus membranes  Neck: supple, no lymphadenopathy  CV: iRR R, soft heart sounds, no murmurs appreciated, 2+ peripheral pitting edema  Pulm: Decreased breath sounds, no increased work of breathing, no adventitious sounds  Abd: Soft, nontender, nondistended  Skin: Warm, dry and intact  Psych: Appropriate mood and affect    Scheduled Meds   amiodarone, 200 mg, Oral, Daily  apixaban, 5 mg, Oral, Q12H  atorvastatin, 10 mg, Oral, Daily  furosemide, 20 mg, Oral, BID  insulin glargine, 10 Units, Subcutaneous, Daily  insulin lispro, 2-7 Units, Subcutaneous, 4x Daily AC & at Bedtime  propranolol, 80 mg, Oral, Daily  sodium chloride, 10 mL, Intravenous, Q12H  sodium chloride, 0.5 g, Oral, BID With Meals  sodium zirconium cyclosilicate, 10 g, Oral, Once  tamsulosin, 0.4 mg, Oral, Daily       PRN Meds     senna-docusate sodium **AND** polyethylene glycol **AND** bisacodyl **AND** bisacodyl    dextrose    dextrose    glucagon (human recombinant)    Magnesium Standard Dose  Replacement - Follow Nurse / BPA Driven Protocol    melatonin    [COMPLETED] Insert Peripheral IV **AND** sodium chloride    sodium chloride    sodium chloride   Infusions         Diagnostic Data    Results from last 7 days   Lab Units 12/08/24  0825 12/05/24  1014 12/05/24  0943   WBC 10*3/mm3 8.49   < >  --    HEMOGLOBIN g/dL 10.0*   < >  --    HEMATOCRIT % 29.8*   < >  --    PLATELETS 10*3/mm3 185   < >  --    GLUCOSE mg/dL 227*   < > 181*   CREATININE mg/dL 1.42*   < > 1.09   BUN mg/dL 45*   < > 27*   SODIUM mmol/L 129*   < > 132*   POTASSIUM mmol/L 5.2   < > 4.0   AST (SGOT) U/L  --   --  35   ALT (SGPT) U/L  --   --  13   ALK PHOS U/L  --   --  71   BILIRUBIN mg/dL  --   --  0.5   ANION GAP mmol/L 12.2   < > 7.9    < > = values in this interval not displayed.       No radiology results for the last day    Interval results reviewed.    Assessment/Plan:     Acute CHF exacerbation  GI on ckd3  Hyponatremia  Hypertension  Diabetes  Atrial fibrillation  Status post fall at home  Anemia of chronic diseases  Generalized weakness     Patient was given Bumex one-time dose by renal and continue Lasix.  Renal function improving  Status post albumin.     Na improving.  Status post urea.  Hyponatremia most likely pseudohyponatremia due to hyperglycemia and CHF exacerbation.     DC losartan secondary to hypotension and borderline high nl potassium.  Hold propranolol for now     Blood sugar slightly better, off pioglitazone as it was contributing to patient's heart failure and continue Lantus 10 units daily.     Patient would benefit from acute inpatient rehab facility, patient lives alone and able to perform ADLs by himself prior to hospitalization.    Appeal in process.      Treatment plan discussed with RN.     VTE Prophylaxis:  Pharmacologic VTE prophylaxis orders are present.         Code status is   Code Status and Medical Interventions: No CPR (Do Not Attempt to Resuscitate); Full Support   Ordered at: 11/28/24 1024      Level Of Support Discussed With:    Next of Kin (If No Surrogate)     Code Status (Patient has no pulse and is not breathing):    No CPR (Do Not Attempt to Resuscitate)     Medical Interventions (Patient has pulse or is breathing):    Full Support       Plan for disposition:   Barriers to Discharge: awaiting placement  Anticipated Date of Discharge: 12/9/24  Place of Discharge: rehab        Time: 40 minutes     Signature: Electronically signed by Ryann Bond MD, 12/08/24, 10:34 EST.  [facility] Hospitalist Team

## 2024-12-08 NOTE — PLAN OF CARE
Pt A&Ox2-3. On room air. Pt and family still refusing IV access and pt refusing telemetry at night. Pt family wants morning labs drawn once pt is awake and up for the day, same as the previous couple of days. Pt regularly picking at scabs through out the shift, causing them to bleed. Pt was agreeable to the use of a wedge to keep him side lying during the night to reduce pressure to his coccyx. Pt's spouse remained at bedside through the night.   Problem: Adult Inpatient Plan of Care  Goal: Plan of Care Review  Outcome: Progressing  Goal: Patient-Specific Goal (Individualized)  Outcome: Progressing  Goal: Absence of Hospital-Acquired Illness or Injury  Outcome: Progressing  Intervention: Identify and Manage Fall Risk  Intervention: Prevent Skin Injury  Intervention: Prevent Infection  Goal: Optimal Comfort and Wellbeing  Outcome: Progressing  Intervention: Provide Person-Centered Care  Goal: Readiness for Transition of Care  Outcome: Progressing     Problem: Skin Injury Risk Increased  Goal: Skin Health and Integrity  Outcome: Progressing  Intervention: Optimize Skin Protection  Intervention: Promote and Optimize Oral Intake     Problem: Fall Injury Risk  Goal: Absence of Fall and Fall-Related Injury  Outcome: Progressing  Intervention: Identify and Manage Contributors  Intervention: Promote Injury-Free Environment     Problem: Confusion Acute  Goal: Optimal Cognitive Function  Outcome: Progressing  Intervention: Minimize Contributing Factors   Goal Outcome Evaluation:

## 2024-12-09 LAB
ANION GAP SERPL CALCULATED.3IONS-SCNC: 11.4 MMOL/L (ref 5–15)
BUN SERPL-MCNC: 52 MG/DL (ref 8–23)
BUN/CREAT SERPL: 35.6 (ref 7–25)
CALCIUM SPEC-SCNC: 9.1 MG/DL (ref 8.2–9.6)
CHLORIDE SERPL-SCNC: 95 MMOL/L (ref 98–107)
CO2 SERPL-SCNC: 22.6 MMOL/L (ref 22–29)
CREAT SERPL-MCNC: 1.46 MG/DL (ref 0.76–1.27)
DEPRECATED RDW RBC AUTO: 54.7 FL (ref 37–54)
EGFRCR SERPLBLD CKD-EPI 2021: 43.7 ML/MIN/1.73
ERYTHROCYTE [DISTWIDTH] IN BLOOD BY AUTOMATED COUNT: 14.6 % (ref 12.3–15.4)
GLUCOSE BLDC GLUCOMTR-MCNC: 106 MG/DL (ref 70–105)
GLUCOSE BLDC GLUCOMTR-MCNC: 232 MG/DL (ref 70–105)
GLUCOSE BLDC GLUCOMTR-MCNC: 261 MG/DL (ref 70–105)
GLUCOSE BLDC GLUCOMTR-MCNC: 263 MG/DL (ref 70–105)
GLUCOSE BLDC GLUCOMTR-MCNC: 76 MG/DL (ref 70–105)
GLUCOSE SERPL-MCNC: 68 MG/DL (ref 65–99)
HCT VFR BLD AUTO: 30.2 % (ref 37.5–51)
HGB BLD-MCNC: 9.7 G/DL (ref 13–17.7)
MAGNESIUM SERPL-MCNC: 2 MG/DL (ref 1.7–2.3)
MCH RBC QN AUTO: 32.3 PG (ref 26.6–33)
MCHC RBC AUTO-ENTMCNC: 32.1 G/DL (ref 31.5–35.7)
MCV RBC AUTO: 100.7 FL (ref 79–97)
PLATELET # BLD AUTO: 176 10*3/MM3 (ref 140–450)
PMV BLD AUTO: 8.5 FL (ref 6–12)
POTASSIUM SERPL-SCNC: 4.4 MMOL/L (ref 3.5–5.2)
RBC # BLD AUTO: 3 10*6/MM3 (ref 4.14–5.8)
SODIUM SERPL-SCNC: 129 MMOL/L (ref 136–145)
WBC NRBC COR # BLD AUTO: 7.7 10*3/MM3 (ref 3.4–10.8)

## 2024-12-09 PROCEDURE — 63710000001 INSULIN GLARGINE PER 5 UNITS: Performed by: INTERNAL MEDICINE

## 2024-12-09 PROCEDURE — 85027 COMPLETE CBC AUTOMATED: CPT | Performed by: INTERNAL MEDICINE

## 2024-12-09 PROCEDURE — 63710000001 INSULIN LISPRO (HUMAN) PER 5 UNITS: Performed by: PHYSICIAN ASSISTANT

## 2024-12-09 PROCEDURE — 80048 BASIC METABOLIC PNL TOTAL CA: CPT | Performed by: INTERNAL MEDICINE

## 2024-12-09 PROCEDURE — 82948 REAGENT STRIP/BLOOD GLUCOSE: CPT | Performed by: PHYSICIAN ASSISTANT

## 2024-12-09 PROCEDURE — 82948 REAGENT STRIP/BLOOD GLUCOSE: CPT

## 2024-12-09 PROCEDURE — 83735 ASSAY OF MAGNESIUM: CPT | Performed by: INTERNAL MEDICINE

## 2024-12-09 RX ADMIN — APIXABAN 5 MG: 5 TABLET, FILM COATED ORAL at 09:28

## 2024-12-09 RX ADMIN — FUROSEMIDE 20 MG: 20 TABLET ORAL at 09:29

## 2024-12-09 RX ADMIN — INSULIN LISPRO 4 UNITS: 100 INJECTION, SOLUTION INTRAVENOUS; SUBCUTANEOUS at 17:25

## 2024-12-09 RX ADMIN — FUROSEMIDE 20 MG: 20 TABLET ORAL at 17:26

## 2024-12-09 RX ADMIN — APIXABAN 5 MG: 5 TABLET, FILM COATED ORAL at 21:34

## 2024-12-09 RX ADMIN — INSULIN LISPRO 3 UNITS: 100 INJECTION, SOLUTION INTRAVENOUS; SUBCUTANEOUS at 21:34

## 2024-12-09 RX ADMIN — INSULIN LISPRO 4 UNITS: 100 INJECTION, SOLUTION INTRAVENOUS; SUBCUTANEOUS at 12:00

## 2024-12-09 RX ADMIN — SODIUM CHLORIDE 0.5 G: 1 TABLET ORAL at 17:25

## 2024-12-09 RX ADMIN — ATORVASTATIN CALCIUM 10 MG: 10 TABLET ORAL at 09:29

## 2024-12-09 RX ADMIN — INSULIN GLARGINE-YFGN 10 UNITS: 100 INJECTION, SOLUTION SUBCUTANEOUS at 09:30

## 2024-12-09 RX ADMIN — SODIUM CHLORIDE 0.5 G: 1 TABLET ORAL at 08:33

## 2024-12-09 RX ADMIN — EMPAGLIFLOZIN 10 MG: 10 TABLET, FILM COATED ORAL at 12:03

## 2024-12-09 RX ADMIN — AMIODARONE HYDROCHLORIDE 200 MG: 200 TABLET ORAL at 09:28

## 2024-12-09 RX ADMIN — PROPRANOLOL HYDROCHLORIDE 80 MG: 20 TABLET ORAL at 09:30

## 2024-12-09 RX ADMIN — TAMSULOSIN HYDROCHLORIDE 0.4 MG: 0.4 CAPSULE ORAL at 09:28

## 2024-12-09 NOTE — PROGRESS NOTES
PROGRESS NOTE      Patient Name: Ap Roe  : 1/15/1928  MRN: [id number]  Primary Care Physician: Elva Mota MD  Date of admission: 2024    Patient Care Team:  Elva Mota MD as PCP - General (Internal Medicine)        Subjective   Subjective:     Patient seen and examined   NAD noted  Creatinine essentially unchanged    Review of systems:  ROS negative      Allergies:    Allergies   Allergen Reactions    Celebrex [Celecoxib] Hives       Objective   Exam:     Vital Signs  Temp:  [96.9 °F (36.1 °C)-98 °F (36.7 °C)] 97.8 °F (36.6 °C)  Heart Rate:  [59-78] 62  Resp:  [12-18] 18  BP: ()/(56-70) 99/62  SpO2:  [91 %-97 %] 96 %  on   ;   Device (Oxygen Therapy): room air  Body mass index is 27.89 kg/m².    General:   male in no acute distress.    Head:      Normocephalic and atraumatic.    Eyes:      PERRL/EOM intact, conjunctivae and sclerae clear without nystagmus.    Neck:      No masses, thyromegaly,  trachea central with normal respiratory effort   Lungs:    Clear bilaterally to auscultation.    Heart:      Regular rate and rhythm, no murmur no gallop  Abd:        Soft, nontender, not distended, bowel sounds positive, no shifting dullness   Pulses:   Pulses palpable  Extr:        No cyanosis or clubbing--+edema.    Neuro:    No focal deficits.   alert oriented x3  Skin:       Intact without lesions or rashes.    Psych:    Alert and cooperative; normal mood and affect; .      Results Review:  I have personally reviewed most recent Data :  CBC    Results from last 7 days   Lab Units 24  0514 24  0825 24  1014 24  0301   WBC 10*3/mm3 7.70 8.49 6.03 5.67   HEMOGLOBIN g/dL 9.7* 10.0* 10.6* 10.6*   PLATELETS 10*3/mm3 176 185 188 184     CMP   Results from last 7 days   Lab Units 24  0514 24  0825 24  0856 24  0735 24  0943 24  0301 24  1717   SODIUM mmol/L 129* 129* 128* 127* 132* 128* 123*   POTASSIUM mmol/L 4.4 5.2 5.0  4.3 4.0 4.8 5.3*   CHLORIDE mmol/L 95* 93* 94* 94* 105 95* 95*   CO2 mmol/L 22.6 23.8 23.4 22.5 19.1* 22.1 19.2*   BUN mg/dL 52* 45* 38* 32* 27* 34* 33*   CREATININE mg/dL 1.46* 1.42* 1.43* 1.46* 1.09 1.46* 1.54*   GLUCOSE mg/dL 68 227* 212* 169* 181* 175* 276*   ALBUMIN g/dL  --   --   --   --  2.6*  --  3.4*   BILIRUBIN mg/dL  --   --   --   --  0.5  --  0.5   ALK PHOS U/L  --   --   --   --  71  --  102   AST (SGOT) U/L  --   --   --   --  35  --  34   ALT (SGPT) U/L  --   --   --   --  13  --  16     ABG      No radiology results for the last day    Results for orders placed during the hospital encounter of 11/26/24    Adult Transthoracic Echo Complete W/ Cont if Necessary Per Protocol    Interpretation Summary    Left ventricular systolic function is low normal. Left ventricular ejection fraction appears to be 51 - 55%.    Left ventricular diastolic function is consistent with (grade Ia w/high LAP) impaired relaxation.    The left atrial cavity is moderately dilated.    Left atrial volume is moderately increased.    There is moderate calcification of the aortic valve.    Estimated right ventricular systolic pressure from tricuspid regurgitation is normal (<35 mmHg).    Scheduled Meds:amiodarone, 200 mg, Oral, Daily  apixaban, 5 mg, Oral, Q12H  atorvastatin, 10 mg, Oral, Daily  furosemide, 20 mg, Oral, BID  insulin glargine, 10 Units, Subcutaneous, Daily  insulin lispro, 2-7 Units, Subcutaneous, 4x Daily AC & at Bedtime  propranolol, 80 mg, Oral, Daily  sodium chloride, 10 mL, Intravenous, Q12H  sodium chloride, 0.5 g, Oral, BID With Meals  tamsulosin, 0.4 mg, Oral, Daily      Continuous Infusions:   PRN Meds:  senna-docusate sodium **AND** polyethylene glycol **AND** bisacodyl **AND** bisacodyl    dextrose    dextrose    glucagon (human recombinant)    Magnesium Standard Dose Replacement - Follow Nurse / BPA Driven Protocol    melatonin    sodium chloride    sodium chloride    Assessment & Plan   Assessment and  Plan:         Weakness    ASSESSMENT:  Acute kidney injury: Likely related to angiotensin receptor blocker and may be some cardiorenal syndrome specially with the presence of diastolic dysfunctions  Congestive heart failure with grade 1 diastolic dysfunction  History of hypertension  History of diabetes mellitus  Atrial fibrillation    PLAN :     Patient with acute kidney injury which is improving at this time  Patient edema that was getting better still developing again.  Specially with some drop in the sodium and mild acidosis normal potassium all consistent with some cardiorenal syndrome  Continue Lasix that helping with the sodium, helping with the volume underlying diastolic dysfunction  Continue Lasix at 20 mg a day   remaining electrolytes and acid-base acceptable  Manage blood sugar aggressively that also contributing to some hyponatremia   Blood pressure stable o will decrease losartan to 25 mg a day  Daily labs  Labs Actos has been stopped because of the edema I think Farxiga may be a better medicine for diabetic control  We will continue to follow        Electronically signed by   Darrell Perez MD.   [facility] kidney consultant  [phone]  12/9/2024  11:12 EST

## 2024-12-09 NOTE — SIGNIFICANT NOTE
Case Management/Social Work    Patient Name:  Ap Roe  YOB: 1928  MRN: [id number]  Admit Date:  11/26/2024 12/09/24 1228   Post Acute Pre-Cert Documentation   Date Post Acute Pre-Cert Completed 12/09/24   Response Received from Insurance? Approval   Post Acute Pre-Cert Initiated Comment CLAIRE verified SNF precert approval via [facility] portal. Plan auth ID [id number], portal auth ID [id number]. Valid 12/9-12/11. CM made aware.             Electronically signed by:  Charles Avitia CMA  12/09/24 12:29 EST    Charles Avitia  Case Management Associate  [address] [address]  P: [phone]  F: [phone]

## 2024-12-09 NOTE — CASE MANAGEMENT/SOCIAL WORK
Continued Stay Note  [facility]     Patient Name: Ap Roe  MRN: [id number]  Today's Date: 12/9/2024    Admit Date: 11/26/2024    Plan: D/C Plan: [facility] accepted. Precert approved 12/9-12/11. PASRR approved. From home with spouse.   Discharge Plan       Row Name 12/09/24 1550       Plan    Plan D/C Plan: [facility] accepted. Precert approved 12/9-12/11. PASRR approved. From home with spouse.    Plan Comments CM was notified that the patient's precert was approved but [facility] is out of network. CM reached out to [facility] and verified that the patient's coverage is the same for in network and out of network so the cost will be the same. The patient's daughter was at bedside and is aware. A bed is ready for him now. MD made aware. D/C barriers: kidney numbers worsening.             Expected Discharge Date and Time       Expected Discharge Date Expected Discharge Time    Dec 10, 2024           Elaine Valle RN     [address] [address]  Phone: [phone]  Fax: [phone]

## 2024-12-09 NOTE — PLAN OF CARE
Goal Outcome Evaluation:      Pt resting comfortably this shift. Wife at bedside. Pt remains with no PIV per orders. Telemetry discontinued. Pt ambulated to restroom several times, with unmeasured urine output. Plan to d/c to [facility], pending precert. VSS. No complaints at this time.     Problem: Adult Inpatient Plan of Care  Goal: Plan of Care Review  Outcome: Progressing  Goal: Patient-Specific Goal (Individualized)  Outcome: Progressing  Goal: Absence of Hospital-Acquired Illness or Injury  Outcome: Progressing  Intervention: Identify and Manage Fall Risk  Recent Flowsheet Documentation  Taken 12/9/2024 0445 by Cassidy Marte RN  Safety Promotion/Fall Prevention:   safety round/check completed   room organization consistent   nonskid shoes/slippers when out of bed   fall prevention program maintained   clutter free environment maintained   assistive device/personal items within reach  Taken 12/9/2024 0200 by Cassidy Marte RN  Safety Promotion/Fall Prevention:   safety round/check completed   room organization consistent   nonskid shoes/slippers when out of bed   fall prevention program maintained   clutter free environment maintained   assistive device/personal items within reach  Taken 12/9/2024 0000 by Cassidy Marte RN  Safety Promotion/Fall Prevention:   room organization consistent   safety round/check completed   nonskid shoes/slippers when out of bed   fall prevention program maintained   clutter free environment maintained   assistive device/personal items within reach  Taken 12/8/2024 2208 by Cassidy Marte, RN  Safety Promotion/Fall Prevention:   room organization consistent   safety round/check completed   nonskid shoes/slippers when out of bed   fall prevention program maintained   clutter free environment maintained   assistive device/personal items within reach  Taken 12/8/2024 2020 by Cassidy Marte, RN  Safety Promotion/Fall Prevention:   safety  round/check completed   room organization consistent   nonskid shoes/slippers when out of bed   fall prevention program maintained   clutter free environment maintained   assistive device/personal items within reach  Intervention: Prevent Skin Injury  Recent Flowsheet Documentation  Taken 12/9/2024 0445 by Cassidy Marte RN  Body Position:   side-lying   tilted   left   position changed independently  Skin Protection:   incontinence pads utilized   transparent dressing maintained  Taken 12/9/2024 0200 by Cassidy Marte RN  Body Position:   side-lying   right  Taken 12/9/2024 0000 by Cassidy Marte RN  Body Position:   side-lying   left   position changed independently  Skin Protection:   incontinence pads utilized   transparent dressing maintained  Taken 12/8/2024 2208 by Cassidy Marte RN  Body Position:   position changed independently   supine  Taken 12/8/2024 2020 by Cassidy Marte RN  Body Position:   position changed independently   legs elevated  Skin Protection:   incontinence pads utilized   transparent dressing maintained  Intervention: Prevent and Manage VTE (Venous Thromboembolism) Risk  Recent Flowsheet Documentation  Taken 12/8/2024 2020 by Cassidy Marte RN  VTE Prevention/Management:   bilateral   SCDs (sequential compression devices) off   patient refused intervention  Intervention: Prevent Infection  Recent Flowsheet Documentation  Taken 12/9/2024 0445 by Cassidy Marte RN  Infection Prevention:   single patient room provided   rest/sleep promoted   personal protective equipment utilized   hand hygiene promoted   equipment surfaces disinfected  Taken 12/9/2024 0200 by Cassidy Marte RN  Infection Prevention:   single patient room provided   rest/sleep promoted   personal protective equipment utilized   hand hygiene promoted   equipment surfaces disinfected  Taken 12/9/2024 0000 by Cassidy Marte RN  Infection Prevention:   single  patient room provided   rest/sleep promoted   personal protective equipment utilized   hand hygiene promoted   equipment surfaces disinfected   environmental surveillance performed  Taken 12/8/2024 2208 by Cassidy Marte RN  Infection Prevention:   single patient room provided   rest/sleep promoted   personal protective equipment utilized   hand hygiene promoted   equipment surfaces disinfected  Taken 12/8/2024 2020 by Cassidy Marte RN  Infection Prevention:   single patient room provided   rest/sleep promoted   personal protective equipment utilized   hand hygiene promoted   equipment surfaces disinfected  Goal: Optimal Comfort and Wellbeing  Outcome: Progressing  Intervention: Monitor Pain and Promote Comfort  Recent Flowsheet Documentation  Taken 12/9/2024 0445 by Cassidy Marte RN  Pain Management Interventions:   care clustered   medication offered but refused   pillow support provided   position adjusted   quiet environment facilitated   relaxation techniques promoted  Taken 12/9/2024 0000 by Cassidy Marte RN  Pain Management Interventions:   care clustered   pain management plan reviewed with patient/caregiver   pillow support provided   position adjusted   quiet environment facilitated   relaxation techniques promoted  Taken 12/8/2024 2020 by Cassidy Marte RN  Pain Management Interventions:   care clustered   pain management plan reviewed with patient/caregiver   pillow support provided   position adjusted   quiet environment facilitated   relaxation techniques promoted  Intervention: Provide Person-Centered Care  Recent Flowsheet Documentation  Taken 12/8/2024 2020 by Cassidy Marte RN  Trust Relationship/Rapport:   care explained   questions answered   questions encouraged  Goal: Readiness for Transition of Care  Outcome: Progressing

## 2024-12-09 NOTE — PROGRESS NOTES
[facility] MEDICINE SERVICE  DAILY PROGRESS NOTE    NAME: Ap Roe  : 1/15/1928  MRN: [id number]      LOS: 12 days     PROVIDER OF SERVICE: Ryann Bond MD    Chief Complaint: Weakness    Subjective:     Interval History:    Patient seen and evaluated at bedside.  Eating breakfast denies any complain  No nausea vomiting or diarrhea.  Blood sugar running better  Treatment plan discussed with patient. All questions addressed.     Review of Systems:   All 21 ROS were negative except mentioned above.    Objective:     Vital Signs  Temp:  [96.9 °F (36.1 °C)-98 °F (36.7 °C)] 97.6 °F (36.4 °C)  Heart Rate:  [59-78] 65  Resp:  [12-18] 17  BP: (101-119)/(56-70) 119/65   Body mass index is 27.89 kg/m².    Physical Exam   General: No acute distress, appears stated age  Neuro: Awake and alert, oriented x3, no focal deficits appreciated  Head: atraumatic, normocephalic  HEENT: EOMI, anicteric, normal sclera and conjunctivae, moist mucus membranes  Neck: supple, no lymphadenopathy  CV: iRR R, soft heart sounds, no murmurs appreciated, 2+ peripheral pitting edema  Pulm: Decreased breath sounds, no increased work of breathing, no adventitious sounds  Abd: Soft, nontender, nondistended  Skin: Warm, dry and intact  Psych: Appropriate mood and affect    Scheduled Meds   amiodarone, 200 mg, Oral, Daily  apixaban, 5 mg, Oral, Q12H  atorvastatin, 10 mg, Oral, Daily  furosemide, 20 mg, Oral, BID  insulin glargine, 10 Units, Subcutaneous, Daily  insulin lispro, 2-7 Units, Subcutaneous, 4x Daily AC & at Bedtime  propranolol, 80 mg, Oral, Daily  sodium chloride, 10 mL, Intravenous, Q12H  sodium chloride, 0.5 g, Oral, BID With Meals  tamsulosin, 0.4 mg, Oral, Daily       PRN Meds     senna-docusate sodium **AND** polyethylene glycol **AND** bisacodyl **AND** bisacodyl    dextrose    dextrose    glucagon (human recombinant)    Magnesium Standard Dose Replacement - Follow Nurse / BPA Driven Protocol    melatonin    sodium  chloride    sodium chloride   Infusions         Diagnostic Data    Results from last 7 days   Lab Units 12/09/24  0514 12/05/24  1014 12/05/24  0943   WBC 10*3/mm3 7.70   < >  --    HEMOGLOBIN g/dL 9.7*   < >  --    HEMATOCRIT % 30.2*   < >  --    PLATELETS 10*3/mm3 176   < >  --    GLUCOSE mg/dL 68   < > 181*   CREATININE mg/dL 1.46*   < > 1.09   BUN mg/dL 52*   < > 27*   SODIUM mmol/L 129*   < > 132*   POTASSIUM mmol/L 4.4   < > 4.0   AST (SGOT) U/L  --   --  35   ALT (SGPT) U/L  --   --  13   ALK PHOS U/L  --   --  71   BILIRUBIN mg/dL  --   --  0.5   ANION GAP mmol/L 11.4   < > 7.9    < > = values in this interval not displayed.       No radiology results for the last day    Interval results reviewed.    Assessment/Plan:   Acute CHF exacerbation  GI on ckd3  Hyponatremia  Hypertension  Diabetes  Atrial fibrillation  Status post fall at home  Anemia of chronic diseases  Generalized weakness     continue Lasix.  Renal function about the same  Status post albumin and urea.     Na improving.  Status post urea.  Hyponatremia most likely pseudohyponatremia due to hyperglycemia and CHF exacerbation.     DC losartan secondary to hypotension and borderline high nl potassium.  Hold propranolol for now     Blood sugar better, off pioglitazone as it was contributing to patient's heart failure and continue Lantus 10 units daily.     Continue amiodarone and apixaban for atrial fibrillation heart rate controlled    Patient would benefit from acute inpatient rehab facility, patient lives alone and able to perform ADLs by himself prior to hospitalization.    Appeal in process.        Treatment plan discussed with RN.     VTE Prophylaxis:  Pharmacologic VTE prophylaxis orders are present.         Code status is   Code Status and Medical Interventions: No CPR (Do Not Attempt to Resuscitate); Full Support   Ordered at: 11/28/24 1024     Level Of Support Discussed With:    Next of Kin (If No Surrogate)     Code Status (Patient has  no pulse and is not breathing):    No CPR (Do Not Attempt to Resuscitate)     Medical Interventions (Patient has pulse or is breathing):    Full Support       Plan for disposition:       Barriers to Discharge: awaiting placement/appeal filed  Anticipated Date of Discharge: Unknown awaiting placement  Place of Discharge: rehab       Time: 40 minutes     Signature: Electronically signed by Ryann Bond MD, 12/09/24, 08:49 EST.  [facility]

## 2024-12-10 VITALS
HEIGHT: 68 IN | DIASTOLIC BLOOD PRESSURE: 66 MMHG | TEMPERATURE: 97.2 F | WEIGHT: 182.76 LBS | HEART RATE: 61 BPM | RESPIRATION RATE: 15 BRPM | SYSTOLIC BLOOD PRESSURE: 112 MMHG | BODY MASS INDEX: 27.7 KG/M2 | OXYGEN SATURATION: 96 %

## 2024-12-10 LAB
ANION GAP SERPL CALCULATED.3IONS-SCNC: 11.5 MMOL/L (ref 5–15)
BUN SERPL-MCNC: 61 MG/DL (ref 8–23)
BUN/CREAT SERPL: 34.9 (ref 7–25)
CALCIUM SPEC-SCNC: 9.1 MG/DL (ref 8.2–9.6)
CHLORIDE SERPL-SCNC: 93 MMOL/L (ref 98–107)
CO2 SERPL-SCNC: 24.5 MMOL/L (ref 22–29)
CREAT SERPL-MCNC: 1.75 MG/DL (ref 0.76–1.27)
DEPRECATED RDW RBC AUTO: 50.3 FL (ref 37–54)
EGFRCR SERPLBLD CKD-EPI 2021: 35.2 ML/MIN/1.73
ERYTHROCYTE [DISTWIDTH] IN BLOOD BY AUTOMATED COUNT: 14.6 % (ref 12.3–15.4)
GLUCOSE BLDC GLUCOMTR-MCNC: 125 MG/DL (ref 70–105)
GLUCOSE BLDC GLUCOMTR-MCNC: 300 MG/DL (ref 70–105)
GLUCOSE SERPL-MCNC: 106 MG/DL (ref 65–99)
HCT VFR BLD AUTO: 29 % (ref 37.5–51)
HGB BLD-MCNC: 9.9 G/DL (ref 13–17.7)
MAGNESIUM SERPL-MCNC: 2.1 MG/DL (ref 1.7–2.3)
MCH RBC QN AUTO: 32.1 PG (ref 26.6–33)
MCHC RBC AUTO-ENTMCNC: 34.1 G/DL (ref 31.5–35.7)
MCV RBC AUTO: 94.2 FL (ref 79–97)
PLATELET # BLD AUTO: 203 10*3/MM3 (ref 140–450)
PMV BLD AUTO: 8.5 FL (ref 6–12)
POTASSIUM SERPL-SCNC: 4.4 MMOL/L (ref 3.5–5.2)
RBC # BLD AUTO: 3.08 10*6/MM3 (ref 4.14–5.8)
SODIUM SERPL-SCNC: 129 MMOL/L (ref 136–145)
WBC NRBC COR # BLD AUTO: 6.57 10*3/MM3 (ref 3.4–10.8)

## 2024-12-10 PROCEDURE — 97110 THERAPEUTIC EXERCISES: CPT

## 2024-12-10 PROCEDURE — 97116 GAIT TRAINING THERAPY: CPT

## 2024-12-10 PROCEDURE — 80048 BASIC METABOLIC PNL TOTAL CA: CPT | Performed by: INTERNAL MEDICINE

## 2024-12-10 PROCEDURE — 85027 COMPLETE CBC AUTOMATED: CPT | Performed by: INTERNAL MEDICINE

## 2024-12-10 PROCEDURE — 97530 THERAPEUTIC ACTIVITIES: CPT

## 2024-12-10 PROCEDURE — 83735 ASSAY OF MAGNESIUM: CPT | Performed by: INTERNAL MEDICINE

## 2024-12-10 PROCEDURE — 82948 REAGENT STRIP/BLOOD GLUCOSE: CPT

## 2024-12-10 PROCEDURE — 63710000001 INSULIN LISPRO (HUMAN) PER 5 UNITS: Performed by: PHYSICIAN ASSISTANT

## 2024-12-10 PROCEDURE — 97112 NEUROMUSCULAR REEDUCATION: CPT

## 2024-12-10 PROCEDURE — 82948 REAGENT STRIP/BLOOD GLUCOSE: CPT | Performed by: PHYSICIAN ASSISTANT

## 2024-12-10 RX ORDER — INSULIN LISPRO 100 [IU]/ML
2-7 INJECTION, SOLUTION INTRAVENOUS; SUBCUTANEOUS
Qty: 15 ML | Refills: 0 | Status: SHIPPED | OUTPATIENT
Start: 2024-12-10

## 2024-12-10 RX ORDER — BUMETANIDE 1 MG/1
1 TABLET ORAL DAILY
Status: DISCONTINUED | OUTPATIENT
Start: 2024-12-10 | End: 2024-12-10 | Stop reason: HOSPADM

## 2024-12-10 RX ORDER — BUMETANIDE 1 MG/1
1 TABLET ORAL DAILY
Qty: 30 TABLET | Refills: 0 | Status: SHIPPED | OUTPATIENT
Start: 2024-12-11 | End: 2025-01-10

## 2024-12-10 RX ORDER — BUMETANIDE 0.25 MG/ML
1 INJECTION, SOLUTION INTRAMUSCULAR; INTRAVENOUS ONCE
Status: DISCONTINUED | OUTPATIENT
Start: 2024-12-10 | End: 2024-12-10 | Stop reason: HOSPADM

## 2024-12-10 RX ORDER — POLYETHYLENE GLYCOL 3350 17 G/17G
17 POWDER, FOR SOLUTION ORAL DAILY PRN
Qty: 510 G | Refills: 0 | Status: SHIPPED | OUTPATIENT
Start: 2024-12-10

## 2024-12-10 RX ADMIN — ATORVASTATIN CALCIUM 10 MG: 10 TABLET ORAL at 08:36

## 2024-12-10 RX ADMIN — INSULIN LISPRO 5 UNITS: 100 INJECTION, SOLUTION INTRAVENOUS; SUBCUTANEOUS at 12:07

## 2024-12-10 RX ADMIN — AMIODARONE HYDROCHLORIDE 200 MG: 200 TABLET ORAL at 08:36

## 2024-12-10 RX ADMIN — BUMETANIDE 1 MG: 1 TABLET ORAL at 12:11

## 2024-12-10 RX ADMIN — Medication 10 ML: at 08:42

## 2024-12-10 RX ADMIN — PROPRANOLOL HYDROCHLORIDE 80 MG: 20 TABLET ORAL at 08:36

## 2024-12-10 RX ADMIN — FUROSEMIDE 20 MG: 20 TABLET ORAL at 08:35

## 2024-12-10 RX ADMIN — APIXABAN 5 MG: 5 TABLET, FILM COATED ORAL at 08:37

## 2024-12-10 RX ADMIN — INSULIN GLARGINE-YFGN 10 UNITS: 100 INJECTION, SOLUTION SUBCUTANEOUS at 08:38

## 2024-12-10 RX ADMIN — TAMSULOSIN HYDROCHLORIDE 0.4 MG: 0.4 CAPSULE ORAL at 08:41

## 2024-12-10 RX ADMIN — EMPAGLIFLOZIN 10 MG: 10 TABLET, FILM COATED ORAL at 08:36

## 2024-12-10 RX ADMIN — SODIUM CHLORIDE 0.5 G: 1 TABLET ORAL at 08:36

## 2024-12-10 NOTE — PLAN OF CARE
Goal Outcome Evaluation:      Assessment: Ap Roe presents with functional mobility impairments which indicate the need for skilled intervention.   Patient hypotensive but asymptomatic.  Demos progress with gait distance and upright tolerance.  Continue to require use of RW and steadying assist.  Continue to recommend SNF level of care once medically appropriate.  Tolerating session today without incident. Will continue to follow and progress as tolerated.            Anticipated Discharge Disposition (PT): skilled nursing facility

## 2024-12-10 NOTE — PROGRESS NOTES
PROGRESS NOTE      Patient Name: Ap Roe  : 1/15/1928  MRN: [id number]  Primary Care Physician: Elva Mota MD  Date of admission: 2024    Patient Care Team:  Elva Mota MD as PCP - General (Internal Medicine)        Subjective   Subjective:     Patient seen and examined   NAD noted  Creatinine essentially unchanged    Review of systems:  ROS negative      Allergies:    Allergies   Allergen Reactions    Celebrex [Celecoxib] Hives       Objective   Exam:     Vital Signs  Temp:  [97.4 °F (36.3 °C)-98.3 °F (36.8 °C)] 98.1 °F (36.7 °C)  Heart Rate:  [60-63] 63  Resp:  [13-18] 15  BP: ()/(58-65) 105/58  SpO2:  [92 %-99 %] 95 %  on   ;   Device (Oxygen Therapy): room air  Body mass index is 27.79 kg/m².    General:   male in no acute distress.    Head:      Normocephalic and atraumatic.    Eyes:      PERRL/EOM intact, conjunctivae and sclerae clear without nystagmus.    Neck:      No masses, thyromegaly,  trachea central with normal respiratory effort   Lungs:    Clear bilaterally to auscultation.    Heart:      Regular rate and rhythm, no murmur no gallop  Abd:        Soft, nontender, not distended, bowel sounds positive, no shifting dullness   Pulses:   Pulses palpable  Extr:        No cyanosis or clubbing--+edema.    Neuro:    No focal deficits.   alert oriented x3  Skin:       Intact without lesions or rashes.    Psych:    Alert and cooperative; normal mood and affect; .      Results Review:  I have personally reviewed most recent Data :  CBC    Results from last 7 days   Lab Units 12/10/24  0325 24  0514 24  0825 24  1014 24  0301   WBC 10*3/mm3 6.57 7.70 8.49 6.03 5.67   HEMOGLOBIN g/dL 9.9* 9.7* 10.0* 10.6* 10.6*   PLATELETS 10*3/mm3 203 176 185 188 184     CMP   Results from last 7 days   Lab Units 12/10/24  0325 24  0514 24  0825 24  0856 24  0735 24  0943 24  0301   SODIUM mmol/L 129* 129* 129* 128* 127* 132* 128*    POTASSIUM mmol/L 4.4 4.4 5.2 5.0 4.3 4.0 4.8   CHLORIDE mmol/L 93* 95* 93* 94* 94* 105 95*   CO2 mmol/L 24.5 22.6 23.8 23.4 22.5 19.1* 22.1   BUN mg/dL 61* 52* 45* 38* 32* 27* 34*   CREATININE mg/dL 1.75* 1.46* 1.42* 1.43* 1.46* 1.09 1.46*   GLUCOSE mg/dL 106* 68 227* 212* 169* 181* 175*   ALBUMIN g/dL  --   --   --   --   --  2.6*  --    BILIRUBIN mg/dL  --   --   --   --   --  0.5  --    ALK PHOS U/L  --   --   --   --   --  71  --    AST (SGOT) U/L  --   --   --   --   --  35  --    ALT (SGPT) U/L  --   --   --   --   --  13  --      ABG      No radiology results for the last day    Results for orders placed during the hospital encounter of 11/26/24    Adult Transthoracic Echo Complete W/ Cont if Necessary Per Protocol    Interpretation Summary    Left ventricular systolic function is low normal. Left ventricular ejection fraction appears to be 51 - 55%.    Left ventricular diastolic function is consistent with (grade Ia w/high LAP) impaired relaxation.    The left atrial cavity is moderately dilated.    Left atrial volume is moderately increased.    There is moderate calcification of the aortic valve.    Estimated right ventricular systolic pressure from tricuspid regurgitation is normal (<35 mmHg).    Scheduled Meds:amiodarone, 200 mg, Oral, Daily  apixaban, 5 mg, Oral, Q12H  atorvastatin, 10 mg, Oral, Daily  bumetanide, 1 mg, Oral, Daily  empagliflozin, 10 mg, Oral, Daily  insulin glargine, 10 Units, Subcutaneous, Daily  insulin lispro, 2-7 Units, Subcutaneous, 4x Daily AC & at Bedtime  propranolol, 80 mg, Oral, Daily  sodium chloride, 10 mL, Intravenous, Q12H  sodium chloride, 0.5 g, Oral, BID With Meals  tamsulosin, 0.4 mg, Oral, Daily      Continuous Infusions:   PRN Meds:  senna-docusate sodium **AND** polyethylene glycol **AND** bisacodyl **AND** bisacodyl    dextrose    dextrose    glucagon (human recombinant)    Magnesium Standard Dose Replacement - Follow Nurse / BPA Driven Protocol    melatonin     sodium chloride    sodium chloride    Assessment & Plan   Assessment and Plan:         Weakness    ASSESSMENT:  Acute kidney injury: Likely related to angiotensin receptor blocker and may be some cardiorenal syndrome specially with the presence of diastolic dysfunctions  Congestive heart failure with grade 1 diastolic dysfunction  History of hypertension  History of diabetes mellitus  Atrial fibrillation    PLAN :     Patient with acute kidney injury which is improving at this time  Continue having some edema which is slightly worse than yesterday  I am discontinuing Lasix and adding Bumex 1 mg a day  As blood pressure is stable extra Bumex 1 mg IV. may be more beneficial  Discontinue Lasix at this time  remaining electrolytes and acid-base acceptable  Manage blood sugar aggressively that also contributing to some hyponatremia   Losartan was discontinued yesterday because of the low blood pressure  Daily labs  Jardiance started at 10 mg daily and blood sugar showed significant improvement  We will continue to follow        Electronically signed by   Darrell Perez MD.   [facility] kidney consultant  [phone]  12/10/2024  10:23 EST

## 2024-12-10 NOTE — CASE MANAGEMENT/SOCIAL WORK
Continued Stay Note   Royce     Patient Name: Ap Roe  MRN: [id number]  Today's Date: 12/10/2024    Admit Date: 11/26/2024    Plan: [facility] accepted. Precert approved (valid 12/9-12/11). PASRR approved. From home with spouse.   Discharge Plan       Row Name 12/10/24 1611       Plan    Plan [facility] accepted. Precert approved (valid 12/9-12/11). PASRR approved. From home with spouse.    Patient/Family in Agreement with Plan yes    Plan Comments CM confirmed with [facility] liaison Sita GAFFNEY that they are ready for patient. CM met with patient's daughter at bedside to discuss dc plan and IMM letter. She requested to have wheelchair van transportation arranged as they are not able to transport patient in their Jeep. CM confirmed with [facility] liaison that they do not have w/c van transportation. CM notified patient's daughter that [facility] w/c van unavailable until further notice. She inquired why there was only one wheelchair van in the area and inquired why it was not available. Inquired what other transport options there were. CM discussed that patient able to tolerate a seated position, currently in chair. Reported that patient did ambulate around 60 feet with therapy today. Discussed that patient did not necessarily meet medical necessity to be transported in an ambulance so if they wanted EMS, it may not be covered by insurance. Discussed that EMS could be arranged as private pay if needed. Daughter contacted her sister who confirmed that they would bring their SUV from [place] to [facility] to attempt to get patient into car with help from staff but were concerned regarding getting the needed help once they arrive at facility. Reported that they would be in contact with Dottie at facility. TIFFANIE received call from [facility] liaison Sita GAFFNEY who reported that they spoke with family regarding arranging [employer]isting [employer] company to pick patient up within 20 minutes. CM provided update to bedside RN to  have patient ready for pick-up.                 Expected Discharge Date and Time       Expected Discharge Date Expected Discharge Time    Dec 10, 2024           Renetta Jon RN     Office Phone: [phone]  Office Cell: [phone]

## 2024-12-10 NOTE — SIGNIFICANT NOTE
12/10/24 1359   OTHER   Discipline occupational therapist   Rehab Time/Intention   Session Not Performed other (see comments)  (anticipating dc, will follow up if pt remains admitetd)   Therapy Assessment/Plan (PT)   Criteria for Skilled Interventions Met (PT) yes;meets criteria;skilled treatment is necessary   Recommendation   OT - Next Appointment 12/11/24

## 2024-12-10 NOTE — PLAN OF CARE
Goal Outcome Evaluation:         Pt resting comfortably this shift with wife at bedside. Blood sugar elevated last night, checked and treated per orders. Pt ambulatory to bathroom with walker and gaitbelt, x1 assist. Na+ on AM labs, remains the same at 129. Cr trending up, from 1.46 to 1.75. Pt with significant BLE edema. Potential d/c to [facility]. VSS. No complaints at this time.     Problem: Adult Inpatient Plan of Care  Goal: Plan of Care Review  Outcome: Progressing  Goal: Patient-Specific Goal (Individualized)  Outcome: Progressing  Goal: Absence of Hospital-Acquired Illness or Injury  Outcome: Progressing  Intervention: Identify and Manage Fall Risk  Recent Flowsheet Documentation  Taken 12/10/2024 0445 by Cassidy Marte, RN  Safety Promotion/Fall Prevention:   safety round/check completed   room organization consistent   nonskid shoes/slippers when out of bed   fall prevention program maintained   clutter free environment maintained   assistive device/personal items within reach  Taken 12/10/2024 0230 by Cassidy Marte, RN  Safety Promotion/Fall Prevention:   safety round/check completed   room organization consistent   nonskid shoes/slippers when out of bed   fall prevention program maintained   clutter free environment maintained   assistive device/personal items within reach  Taken 12/10/2024 0000 by Cassidy Marte, RN  Safety Promotion/Fall Prevention:   safety round/check completed   room organization consistent   nonskid shoes/slippers when out of bed   fall prevention program maintained   clutter free environment maintained   assistive device/personal items within reach  Taken 12/9/2024 2200 by Cassidy Marte, RN  Safety Promotion/Fall Prevention:   safety round/check completed   room organization consistent   nonskid shoes/slippers when out of bed   fall prevention program maintained   clutter free environment maintained   assistive device/personal items within  reach  Taken 12/9/2024 2030 by Cassidy Marte RN  Safety Promotion/Fall Prevention:   safety round/check completed   room organization consistent   nonskid shoes/slippers when out of bed   fall prevention program maintained   clutter free environment maintained   assistive device/personal items within reach  Intervention: Prevent Skin Injury  Recent Flowsheet Documentation  Taken 12/10/2024 0445 by Cassidy Marte RN  Body Position:   supine   weight shifting  Skin Protection: transparent dressing maintained  Taken 12/10/2024 0230 by Cassidy Marte RN  Body Position:   weight shifting   supine  Taken 12/10/2024 0000 by Cassidy Marte RN  Body Position:   side-lying   weight shifting  Skin Protection:   incontinence pads utilized   transparent dressing maintained  Taken 12/9/2024 2200 by Cassidy Marte RN  Body Position:   side-lying   left  Taken 12/9/2024 2030 by Cassidy Marte RN  Body Position: legs elevated  Skin Protection:   incontinence pads utilized   transparent dressing maintained  Intervention: Prevent Infection  Recent Flowsheet Documentation  Taken 12/10/2024 0445 by Cassidy Marte RN  Infection Prevention:   single patient room provided   rest/sleep promoted   personal protective equipment utilized   hand hygiene promoted   equipment surfaces disinfected  Taken 12/10/2024 0230 by Cassidy Marte RN  Infection Prevention:   single patient room provided   rest/sleep promoted   personal protective equipment utilized   hand hygiene promoted   equipment surfaces disinfected  Taken 12/10/2024 0000 by Cassidy Marte RN  Infection Prevention:   single patient room provided   rest/sleep promoted   personal protective equipment utilized   hand hygiene promoted   equipment surfaces disinfected  Taken 12/9/2024 2200 by Cassidy Marte RN  Infection Prevention:   single patient room provided   personal protective equipment utilized   rest/sleep  promoted   hand hygiene promoted   equipment surfaces disinfected  Taken 12/9/2024 2030 by Cassidy Marte RN  Infection Prevention:   single patient room provided   rest/sleep promoted   personal protective equipment utilized   hand hygiene promoted   equipment surfaces disinfected  Goal: Optimal Comfort and Wellbeing  Outcome: Progressing  Intervention: Monitor Pain and Promote Comfort  Recent Flowsheet Documentation  Taken 12/10/2024 0445 by Cassidy Marte RN  Pain Management Interventions:   care clustered   pain management plan reviewed with patient/caregiver   pillow support provided   position adjusted   quiet environment facilitated   relaxation techniques promoted  Taken 12/10/2024 0000 by Cassidy Marte RN  Pain Management Interventions:   care clustered   pain management plan reviewed with patient/caregiver   pillow support provided   position adjusted   quiet environment facilitated   relaxation techniques promoted  Taken 12/9/2024 2030 by Cassidy Marte RN  Pain Management Interventions:   care clustered   pain management plan reviewed with patient/caregiver   pillow support provided   position adjusted   quiet environment facilitated   relaxation techniques promoted  Intervention: Provide Person-Centered Care  Recent Flowsheet Documentation  Taken 12/9/2024 2030 by Cassidy Marte RN  Trust Relationship/Rapport:   care explained   questions answered   questions encouraged  Goal: Readiness for Transition of Care  Outcome: Progressing

## 2024-12-10 NOTE — DISCHARGE SUMMARY
"[facility] Medicine Services  Discharge Summary    Date of Service: 12/10/2024  Patient Name: Ap Roe  : 1/15/1928  MRN: [id number]    Date of Admission: 2024  Discharge Diagnosis: Weakness  Date of Discharge: 12/10/2024  Primary Care Physician: Elva Mota MD    Presenting Problem:   Weakness [R53.1]  Left hip pain [M25.552]  Fall, initial encounter [W19.XXXA]  Acute pain of both knees [M25.561, M25.562]  Abrasion of knee, bilateral [S80.211A, S80.212A]    Active and Resolved Hospital Problems:  Active Hospital Problems    Diagnosis POA    **Weakness [R53.1] Yes      Resolved Hospital Problems   No resolved problems to display.         Hospital Course     HPI:  \" Ap Roe is a 89 y.o. male with a CMH of diabetes, coronary artery disease who presented to [facility] on 2024 with a fall that occurred at home.  He said he was walking up the steps in his garage and fell landing on his knees.  He is also complaining of left hip pain. He did not hit his head.  On ED evaluation no fractures were identified. Patient was originally admitted under observation status.  PT and OT have evaluated this patient and they are recommending inpatient rehab. Hospitalist team to admit for further medical management. \"    Hospital Course:  Fall   Weakness  - X-ray of knee showed degenerative changes with no fractures and soft tissues swelling on the left  - X-ray of hip showed no acute abnormality  - fall precautions  - PT/OT consulted recommending inpatient rehab  - Pre-cert for [facility] rehab approved.     Hypomagnesemia  - Magnesium: 1.3 on admit   - Mag sulfate given in the ED  - Repeat mg back to nl.     Hyponatremia /CHF exacerbation  - appears chronic   - Sodium: 132, 130 at baseline   -Renal was consulted and patient was started on Lasix and then switched to Bumex.  Patient also received albumin, urea and salt tablets.  Patient was also started on Jardiance 10 mg " daily.    Diabetes mellitus  -   - Held metformin due to renal insufficiency  - discontinue Actos due to CHF exacerbation and started on Jardiance 10 mg, Lantus 10 units and sliding scale insulin  - SSI ordered for rehab  - Diabetic diet     Anemia   - Hgb 10.8  - No overt s/sx of bleeding   - Continue to monitor CBC       Atrial fibrillation  -Continued amiodarone, propranolol and Eliquis     Hypertension  - /81  -Patient blood pressure was high later it was low so losartan was discontinued and patient will remain on propranolol for A-fib rate control and blood pressure.       DISCHARGE Follow Up Recommendations for labs and diagnostics:   Patient will follow-up with PCP in 2 to 3 days and with renal in 6 to 8 weeks.    Reasons For Change In Medications and Indications for New Medications:  Metformin and Actos were discontinued due to GI and heart failure.  Jardiance 10 mg was started for diabetes and heart failure.    Day of Discharge     Vital Signs:  Temp:  [97.2 °F (36.2 °C)-98.3 °F (36.8 °C)] 97.2 °F (36.2 °C)  Heart Rate:  [60-63] 61  Resp:  [13-15] 15  BP: ()/(58-66) 112/66    Physical Exam:  Vitals and nursing note reviewed.   Constitutional:       Appearance: Normal appearance.   HENT:      Head: Normocephalic and atraumatic.   Cardiovascular:      Rate and Rhythm: Normal rate and regular rhythm.      Heart sounds: Normal heart sounds.  1+ pitting edema  Pulmonary:      Effort: Pulmonary effort is normal.      Breath sounds: Decreased breath sounds.   Abdominal:      Palpations: Abdomen is soft.   Musculoskeletal:      Cervical back: Neck supple.   Neurological:      Mental Status: Mental status is at baseline.     Pertinent  and/or Most Recent Results     LAB RESULTS:      Lab 12/10/24  0325 12/09/24  0514 12/08/24  0825 12/05/24  1014 12/04/24  0301   WBC 6.57 7.70 8.49 6.03 5.67   HEMOGLOBIN 9.9* 9.7* 10.0* 10.6* 10.6*   HEMATOCRIT 29.0* 30.2* 29.8* 30.9* 31.2*   PLATELETS 203 176 185  188 184   NEUTROS ABS  --   --   --  3.47  --    IMMATURE GRANS (ABS)  --   --   --  0.02  --    LYMPHS ABS  --   --   --  1.52  --    MONOS ABS  --   --   --  0.90  --    EOS ABS  --   --   --  0.10  --    MCV 94.2 100.7* 94.6 92.2 93.4         Lab 12/10/24  0325 12/09/24  0514 12/08/24  0825 12/07/24  0856 12/06/24  0735 12/05/24  0943 12/04/24  0301   SODIUM 129* 129* 129* 128* 127*   < > 128*   POTASSIUM 4.4 4.4 5.2 5.0 4.3   < > 4.8   CHLORIDE 93* 95* 93* 94* 94*   < > 95*   CO2 24.5 22.6 23.8 23.4 22.5   < > 22.1   ANION GAP 11.5 11.4 12.2 10.6 10.5   < > 10.9   BUN 61* 52* 45* 38* 32*   < > 34*   CREATININE 1.75* 1.46* 1.42* 1.43* 1.46*   < > 1.46*   EGFR 35.2* 43.7* 45.2* 44.8* 43.7*   < > 43.7*   GLUCOSE 106* 68 227* 212* 169*   < > 175*   CALCIUM 9.1 9.1 9.3 9.2 8.9   < > 9.3   MAGNESIUM 2.1 2.0 1.9 2.0 2.2   < > 1.7   PHOSPHORUS  --   --   --   --   --   --  4.1    < > = values in this interval not displayed.         Lab 12/05/24  0943   TOTAL PROTEIN 4.8*   ALBUMIN 2.6*   GLOBULIN 2.2   ALT (SGPT) 13   AST (SGOT) 35   BILIRUBIN 0.5   ALK PHOS 71                     Brief Urine Lab Results  (Last result in the past 365 days)        Color   Clarity   Blood   Leuk Est   Nitrite   Protein   CREAT   Urine HCG        11/29/24 1208 Yellow   Clear   Negative   Negative   Negative   Negative                 Microbiology Results (last 10 days)       ** No results found for the last 240 hours. **            XR Hip With or Without Pelvis 2 - 3 View Left    Result Date: 11/26/2024  Impression: Impression: Negative for acute osseous abnormality. Electronically Signed: Damian Keen MD  11/26/2024 5:50 PM EST  Workstation ID: [id number]    XR Knee 1 or 2 View Bilateral    Result Date: 11/26/2024  Impression: Impression: 1.Degenerative changes in both knees. 2.No fractures are identified. 3.Soft tissue swelling laterally on the left. Electronically Signed: Jose Cassidy MD  11/26/2024 4:18 PM EST  Workstation ID: [id number]              Results for orders placed during the hospital encounter of 11/26/24    Adult Transthoracic Echo Complete W/ Cont if Necessary Per Protocol    Interpretation Summary    Left ventricular systolic function is low normal. Left ventricular ejection fraction appears to be 51 - 55%.    Left ventricular diastolic function is consistent with (grade Ia w/high LAP) impaired relaxation.    The left atrial cavity is moderately dilated.    Left atrial volume is moderately increased.    There is moderate calcification of the aortic valve.    Estimated right ventricular systolic pressure from tricuspid regurgitation is normal (<35 mmHg).      Labs Pending at Discharge:  Pending Results       None            Procedures Performed         Consults:   Consults       Date and Time Order Name Status Description    12/3/2024  7:39 AM Inpatient Nephrology Consult Completed     11/27/2024  4:37 PM Inpatient Hospitalist Consult              Discharge Details        Discharge Medications        New Medications        Instructions Start Date   bumetanide 1 MG tablet  Commonly known as: BUMEX   1 mg, Oral, Daily   Start Date: December 11, 2024     empagliflozin 10 MG tablet tablet  Commonly known as: JARDIANCE   10 mg, Oral, Daily   Start Date: December 11, 2024     insulin lispro 100 UNIT/ML injection  Commonly known as: HUMALOG/ADMELOG   2-7 Units, Subcutaneous, 4 Times Daily Before Meals & Nightly      polyethylene glycol 17 g packet  Commonly known as: MIRALAX   17 g, Oral, Daily PRN             Continue These Medications        Instructions Start Date   amiodarone 200 MG tablet  Commonly known as: PACERONE   200 mg, Daily      Eliquis 5 MG tablet tablet  Generic drug: apixaban   1 tablet, Every 12 Hours Scheduled      propranolol 80 MG tablet  Commonly known as: INDERAL   1 tablet, Daily      simvastatin 20 MG tablet  Commonly known as: ZOCOR   1 tablet, Daily      tamsulosin 0.4 MG capsule 24 hr capsule  Commonly known as:  FLOMAX   1 capsule, Daily             Stop These Medications      furosemide 20 MG tablet  Commonly known as: LASIX     losartan 100 MG tablet  Commonly known as: COZAAR     metFORMIN 1000 MG tablet  Commonly known as: GLUCOPHAGE     pioglitazone 30 MG tablet  Commonly known as: ACTOS              Allergies   Allergen Reactions    Celebrex [Celecoxib] Hives       Discharge Disposition:   Home or Self Care    Diet:  Diet Instructions       Diet: Cardiac Diets, Diabetic Diets; Low Sodium (2g); Regular (IDDSI 7); Thin (IDDSI 0); Consistent Carbohydrate      Discharge Diet:  Cardiac Diets  Diabetic Diets       Cardiac Diet: Low Sodium (2g)    Texture: Regular (IDDSI 7)    Fluid Consistency: Thin (IDDSI 0)    Diabetic Diet: Consistent Carbohydrate            Discharge Activity:   Activity Instructions       Activity as Tolerated              CODE STATUS:  Code Status and Medical Interventions: No CPR (Do Not Attempt to Resuscitate); Full Support   Ordered at: 11/28/24 1024     Level Of Support Discussed With:    Next of Kin (If No Surrogate)     Code Status (Patient has no pulse and is not breathing):    No CPR (Do Not Attempt to Resuscitate)     Medical Interventions (Patient has pulse or is breathing):    Full Support       No future appointments.    Additional Instructions for the Follow-ups that You Need to Schedule       Discharge Follow-up with PCP   As directed       Currently Documented PCP:    Elva Mota MD    PCP Phone Number:    [phone]     Follow Up Details: 2-3 days        Discharge Follow-up with Specified Provider: Dr. Perez; 6 Weeks   As directed      To: Dr. Perez   Follow Up: 6 Weeks   Follow Up Details: f/u on renal function                Time spent on Discharge including face to face service:  >30 minutes    Signature: Electronically signed by Ryann Bond MD, 12/10/24, 14:05 [facility] Hospitalist Team

## 2024-12-10 NOTE — THERAPY TREATMENT NOTE
"Subjective: Pt agreeable to therapeutic plan of care.  Patient cleared for therapy by nursing.  Patient found resting in bed,  he is pleasant and agreeable A&Ox4 but [place]    Objective:     Precautions - falls, hypotensive    Bed mobility - Min-A  Transfers - CGA, cues for hand placement  Ambulation - 45 & 60 feet with RW and CGA for balance, cues for pacing and posture.     Patient instructed in high level gait with retro walking x10 feet with RW and Stanton for balance.      Therapeutic Exercise -BLE: AP, LAQ, MIPx10-15 reps  Standing: heel raises, toe raiess, MIP with BUE support and CGA-Stanton x10 reps  Sit<>stand x5 reps to assist with UE placement for transition and venous return.     Vitals:   Supine: 97/48mmHg (67),  60bpm  Sittin/51mmHG (66), 73    Pain: 0 VAS  Education: Provided education on the importance of mobility in the acute care setting, Transfer Training, and Gait Training  discussed home modifications and barriers to return home with family    Assessment: Ap Roe presents with functional mobility impairments which indicate the need for skilled intervention.   Patient hypotensive but asymptomatic.  Demos progress with gait distance and upright tolerance.  Continue to require use of RW and steadying assist.  Continue to recommend SNF level of care once medically appropriate.  Tolerating session today without incident. Will continue to follow and progress as tolerated.     Plan/Recommendations:   If medically appropriate, Moderate Intensity Therapy recommended post-acute care. This is recommended as therapy feels the patient would require 3-4 days per week and wouldn't tolerate \"3 hour daily\" rehab intensity. SNF would be the preferred choice. If the patient does not agree to SNF, arrange HH or OP depending on home bound status. If patient is medically complex, consider LTACH. Pt requires no DME at discharge.     Pt desires Skilled Rehab placement at discharge. Pt cooperative; agreeable to " therapeutic recommendations and plan of care.         Basic Mobility 6-click:  Rollin = Total, A lot = 2, A little = 3; 4 = None  Supine>Sit:   1 = Total, A lot = 2, A little = 3; 4 = None   Sit>Stand with arms:  1 = Total, A lot = 2, A little = 3; 4 = None  Bed>Chair:   1 = Total, A lot = 2, A little = 3; 4 = None  Ambulate in room:  1 = Total, A lot = 2, A little = 3; 4 = None  3-5 Steps with railin = Total, A lot = 2, A little = 3; 4 = None  Score: 18    Modified [place]: N/A = No pre-op stroke/TIA    Post-Tx Position: Up in Chair, Alarms activated, and Call light and personal items within reach  PPE: gloves

## 2024-12-10 NOTE — NURSING NOTE
This nurse has tried to call [facility] to give a report to receiving nurse I left a message and have call several times. Still unable to reach receiving nurse to give handoff report to. Charge nurse made aware of the situation

## 2024-12-11 NOTE — PAYOR COMM NOTE
"This is discharge notification for Luh Ashtz.  Dc'd 12/10/24 to Rehab.    AUTHORIZATION : [id number]     THANK YOU.      Elva Laurent, RN, CCM  Utilization Nurse  [address] [address]   [phone]  Fx [phone]     Luh Roe ZENOBIA (89 y.o. Male)       Date of Birth   01/15/1928    Social Security Number       Address   [address] [place] [address]    Home Phone   [phone]    MRN   [id number]       [employer]   Denominational    Marital Status                               Admission Date   11/26/24    Admission Type   Emergency    Admitting Provider   Shaji Loredo MD    Attending Provider       Department, Room/Bed   [facility] PROGRESS CARE, 2121/1       Discharge Date   12/10/2024    Discharge Disposition   Rehab Facility or Unit (DC - External)    Discharge Destination                                 Attending Provider: (none)   Allergies: Celebrex [Celecoxib]    Isolation: None   Infection: None   Code Status: Prior    Ht: 172.7 cm (68\")   Wt: 82.9 kg (182 lb 12.2 oz)    Admission Cmt: None   Principal Problem: Weakness [R53.1]                   Active Insurance as of 11/26/2024       Primary Coverage       Payor Plan Insurance Group Employer/Plan Group    HUMANA MEDICARE REPLACEMENT HUMANA MED ADV GROUP [id number]       Payor Plan Address Payor Plan Phone Number Payor Plan Fax Number Effective Dates    [address] [address] [phone]  1/1/2018 - None Entered    [facility] [address]         Subscriber Name Subscriber Birth Date Member ID       LUH ROE ZENOBIA 1/15/1928 [id number]                     Emergency Contacts        (Rel.) Home Phone Work Phone Mobile Phone    MICHAEL MENDOZA (Daughter) -- -- [phone]    RILEY ROE (Spouse) [phone] -- --    Joyce Marte (Daughter) [phone] -- --                 Discharge Summary        Ryann Bond MD at 12/10/24 [address] [place] [address] " "Medicine Services  Discharge Summary    Date of Service: 12/10/2024  Patient Name: Ap Roe  : 1/15/1928  MRN: [id number]    Date of Admission: 2024  Discharge Diagnosis: Weakness  Date of Discharge: 12/10/2024  Primary Care Physician: Elva Mota MD    Presenting Problem:   Weakness [R53.1]  Left hip pain [M25.552]  Fall, initial encounter [W19.XXXA]  Acute pain of both knees [M25.561, M25.562]  Abrasion of knee, bilateral [S80.211A, S80.212A]    Active and Resolved Hospital Problems:  Active Hospital Problems    Diagnosis POA    **Weakness [R53.1] Yes      Resolved Hospital Problems   No resolved problems to display.         Hospital Course     HPI:  \" Ap Roe is a 89 y.o. male with a CMH of diabetes, coronary artery disease who presented to [facility] on 2024 with a fall that occurred at home.  He said he was walking up the steps in his garage and fell landing on his knees.  He is also complaining of left hip pain. He did not hit his head.  On ED evaluation no fractures were identified. Patient was originally admitted under observation status.  PT and OT have evaluated this patient and they are recommending inpatient rehab. Hospitalist team to admit for further medical management. \"    Hospital Course:  Fall   Weakness  - X-ray of knee showed degenerative changes with no fractures and soft tissues swelling on the left  - X-ray of hip showed no acute abnormality  - fall precautions  - PT/OT consulted recommending inpatient rehab  - Pre-cert for LOBITO rehab approved.     Hypomagnesemia  - Magnesium: 1.3 on admit   - Mag sulfate given in the ED  - Repeat mg back to nl.     Hyponatremia /CHF exacerbation  - appears chronic   - Sodium: 132, 130 at baseline   -Renal was consulted and patient was started on Lasix and then switched to Bumex.  Patient also received albumin, urea and salt tablets.  Patient was also started on Jardiance 10 mg daily.    Diabetes mellitus  -   - " Held metformin due to renal insufficiency  - discontinue Actos due to CHF exacerbation and started on Jardiance 10 mg, Lantus 10 units and sliding scale insulin  - SSI ordered for rehab  - Diabetic diet     Anemia   - Hgb 10.8  - No overt s/sx of bleeding   - Continue to monitor CBC       Atrial fibrillation  -Continued amiodarone, propranolol and Eliquis     Hypertension  - /81  -Patient blood pressure was high later it was low so losartan was discontinued and patient will remain on propranolol for A-fib rate control and blood pressure.       DISCHARGE Follow Up Recommendations for labs and diagnostics:   Patient will follow-up with PCP in 2 to 3 days and with renal in 6 to 8 weeks.    Reasons For Change In Medications and Indications for New Medications:  Metformin and Actos were discontinued due to GI and heart failure.  Jardiance 10 mg was started for diabetes and heart failure.    Day of Discharge     Vital Signs:  Temp:  [97.2 °F (36.2 °C)-98.3 °F (36.8 °C)] 97.2 °F (36.2 °C)  Heart Rate:  [60-63] 61  Resp:  [13-15] 15  BP: ()/(58-66) 112/66    Physical Exam:  Vitals and nursing note reviewed.   Constitutional:       Appearance: Normal appearance.   HENT:      Head: Normocephalic and atraumatic.   Cardiovascular:      Rate and Rhythm: Normal rate and regular rhythm.      Heart sounds: Normal heart sounds.  1+ pitting edema  Pulmonary:      Effort: Pulmonary effort is normal.      Breath sounds: Decreased breath sounds.   Abdominal:      Palpations: Abdomen is soft.   Musculoskeletal:      Cervical back: Neck supple.   Neurological:      Mental Status: Mental status is at baseline.     Pertinent  and/or Most Recent Results     LAB RESULTS:      Lab 12/10/24  0325 12/09/24  0514 12/08/24  0825 12/05/24  1014 12/04/24  0301   WBC 6.57 7.70 8.49 6.03 5.67   HEMOGLOBIN 9.9* 9.7* 10.0* 10.6* 10.6*   HEMATOCRIT 29.0* 30.2* 29.8* 30.9* 31.2*   PLATELETS 203 176 185 188 184   NEUTROS ABS  --   --   --   3.47  --    IMMATURE GRANS (ABS)  --   --   --  0.02  --    LYMPHS ABS  --   --   --  1.52  --    MONOS ABS  --   --   --  0.90  --    EOS ABS  --   --   --  0.10  --    MCV 94.2 100.7* 94.6 92.2 93.4         Lab 12/10/24  0325 12/09/24  0514 12/08/24  0825 12/07/24  0856 12/06/24  0735 12/05/24  0943 12/04/24  0301   SODIUM 129* 129* 129* 128* 127*   < > 128*   POTASSIUM 4.4 4.4 5.2 5.0 4.3   < > 4.8   CHLORIDE 93* 95* 93* 94* 94*   < > 95*   CO2 24.5 22.6 23.8 23.4 22.5   < > 22.1   ANION GAP 11.5 11.4 12.2 10.6 10.5   < > 10.9   BUN 61* 52* 45* 38* 32*   < > 34*   CREATININE 1.75* 1.46* 1.42* 1.43* 1.46*   < > 1.46*   EGFR 35.2* 43.7* 45.2* 44.8* 43.7*   < > 43.7*   GLUCOSE 106* 68 227* 212* 169*   < > 175*   CALCIUM 9.1 9.1 9.3 9.2 8.9   < > 9.3   MAGNESIUM 2.1 2.0 1.9 2.0 2.2   < > 1.7   PHOSPHORUS  --   --   --   --   --   --  4.1    < > = values in this interval not displayed.         Lab 12/05/24  0943   TOTAL PROTEIN 4.8*   ALBUMIN 2.6*   GLOBULIN 2.2   ALT (SGPT) 13   AST (SGOT) 35   BILIRUBIN 0.5   ALK PHOS 71                     Brief Urine Lab Results  (Last result in the past 365 days)        Color   Clarity   Blood   Leuk Est   Nitrite   Protein   CREAT   Urine HCG        11/29/24 1208 Yellow   Clear   Negative   Negative   Negative   Negative                 Microbiology Results (last 10 days)       ** No results found for the last 240 hours. **            XR Hip With or Without Pelvis 2 - 3 View Left    Result Date: 11/26/2024  Impression: Impression: Negative for acute osseous abnormality. Electronically Signed: Damian Keen MD  11/26/2024 5:50 PM EST  Workstation ID: [id number]    XR Knee 1 or 2 View Bilateral    Result Date: 11/26/2024  Impression: Impression: 1.Degenerative changes in both knees. 2.No fractures are identified. 3.Soft tissue swelling laterally on the left. Electronically Signed: Jose Cassidy MD  11/26/2024 4:18 PM EST  Workstation ID: [id number]             Results for orders placed  during the hospital encounter of 11/26/24    Adult Transthoracic Echo Complete W/ Cont if Necessary Per Protocol    Interpretation Summary    Left ventricular systolic function is low normal. Left ventricular ejection fraction appears to be 51 - 55%.    Left ventricular diastolic function is consistent with (grade Ia w/high LAP) impaired relaxation.    The left atrial cavity is moderately dilated.    Left atrial volume is moderately increased.    There is moderate calcification of the aortic valve.    Estimated right ventricular systolic pressure from tricuspid regurgitation is normal (<35 mmHg).      Labs Pending at Discharge:  Pending Results       None            Procedures Performed         Consults:   Consults       Date and Time Order Name Status Description    12/3/2024  7:39 AM Inpatient Nephrology Consult Completed     11/27/2024  4:37 PM Inpatient Hospitalist Consult              Discharge Details        Discharge Medications        New Medications        Instructions Start Date   bumetanide 1 MG tablet  Commonly known as: BUMEX   1 mg, Oral, Daily   Start Date: December 11, 2024     empagliflozin 10 MG tablet tablet  Commonly known as: JARDIANCE   10 mg, Oral, Daily   Start Date: December 11, 2024     insulin lispro 100 UNIT/ML injection  Commonly known as: HUMALOG/ADMELOG   2-7 Units, Subcutaneous, 4 Times Daily Before Meals & Nightly      polyethylene glycol 17 g packet  Commonly known as: MIRALAX   17 g, Oral, Daily PRN             Continue These Medications        Instructions Start Date   amiodarone 200 MG tablet  Commonly known as: PACERONE   200 mg, Daily      Eliquis 5 MG tablet tablet  Generic drug: apixaban   1 tablet, Every 12 Hours Scheduled      propranolol 80 MG tablet  Commonly known as: INDERAL   1 tablet, Daily      simvastatin 20 MG tablet  Commonly known as: ZOCOR   1 tablet, Daily      tamsulosin 0.4 MG capsule 24 hr capsule  Commonly known as: FLOMAX   1 capsule, Daily              Stop These Medications      furosemide 20 MG tablet  Commonly known as: LASIX     losartan 100 MG tablet  Commonly known as: COZAAR     metFORMIN 1000 MG tablet  Commonly known as: GLUCOPHAGE     pioglitazone 30 MG tablet  Commonly known as: ACTOS              Allergies   Allergen Reactions    Celebrex [Celecoxib] Hives       Discharge Disposition:   Home or Self Care    Diet:  Diet Instructions       Diet: Cardiac Diets, Diabetic Diets; Low Sodium (2g); Regular (IDDSI 7); Thin (IDDSI 0); Consistent Carbohydrate      Discharge Diet:  Cardiac Diets  Diabetic Diets       Cardiac Diet: Low Sodium (2g)    Texture: Regular (IDDSI 7)    Fluid Consistency: Thin (IDDSI 0)    Diabetic Diet: Consistent Carbohydrate            Discharge Activity:   Activity Instructions       Activity as Tolerated              CODE STATUS:  Code Status and Medical Interventions: No CPR (Do Not Attempt to Resuscitate); Full Support   Ordered at: 11/28/24 1024     Level Of Support Discussed With:    Next of Kin (If No Surrogate)     Code Status (Patient has no pulse and is not breathing):    No CPR (Do Not Attempt to Resuscitate)     Medical Interventions (Patient has pulse or is breathing):    Full Support       No future appointments.    Additional Instructions for the Follow-ups that You Need to Schedule       Discharge Follow-up with PCP   As directed       Currently Documented PCP:    Elva Mota MD    PCP Phone Number:    [phone]     Follow Up Details: 2-3 days        Discharge Follow-up with Specified Provider: Dr. Perez; 6 Weeks   As directed      To: Dr. Perez   Follow Up: 6 Weeks   Follow Up Details: f/u on renal function                Time spent on Discharge including face to face service:  >30 minutes    Signature: Electronically signed by Ryann Bond MD, 12/10/24, 14:05 [facility] Hospitalist Team     Electronically signed by Ryann Bond MD at 12/10/24 8997

## 2024-12-11 NOTE — PAYOR COMM NOTE
"This is discharge notification for Luh Roe.    Dc'd on 12/10/24.    AUTHORIZATION : [id number].     THANK YOU.      Elva Laurent, RN, CCM  Utilization Nurse  [address] [address]   [phone]  Fx [phone]     Luh Roe (89 y.o. Male)       Date of Birth   01/15/1928    Social Security Number       Address   [address] [place] [address]    Home Phone   [phone]    MRN   [id number]       Lutheran   [employer]ism    Marital Status                               Admission Date   11/26/24    Admission Type   Emergency    Admitting Provider   Shaji Loredo MD    Attending Provider       Department, Room/Bed   [facility] PROGRESS CARE, 2121/1       Discharge Date   12/10/2024    Discharge Disposition   Rehab Facility or Unit (DC - External)    Discharge Destination                                 Attending Provider: (none)   Allergies: Celebrex [Celecoxib]    Isolation: None   Infection: None   Code Status: Prior    Ht: 172.7 cm (68\")   Wt: 82.9 kg (182 lb 12.2 oz)    Admission Cmt: None   Principal Problem: Weakness [R53.1]                   Active Insurance as of 11/26/2024       Primary Coverage       Payor Plan Insurance Group Employer/Plan Group    HUMANA MEDICARE REPLACEMENT HUMANA MED ADV GROUP [id number]       Payor Plan Address Payor Plan Phone Number Payor Plan Fax Number Effective Dates    [address] [address] [phone]  1/1/2018 - None Entered    [facility] [address]         Subscriber Name Subscriber Birth Date Member ID       LUH ROE 1/15/1928 [id number]                     Emergency Contacts        (Rel.) Home Phone Work Phone Mobile Phone    MICHAEL MENDOZA (Daughter) -- -- [phone]    RILEY ROE (Spouse) [phone] -- --    Joyce Marte (Daughter) [phone] -- --                 Discharge Summary        Ryann Bond MD at 12/10/24 [address] [place] [address] " "Services  Discharge Summary    Date of Service: 12/10/2024  Patient Name: Ap Roe  : 1/15/1928  MRN: [id number]    Date of Admission: 2024  Discharge Diagnosis: Weakness  Date of Discharge: 12/10/2024  Primary Care Physician: Elva Mota MD    Presenting Problem:   Weakness [R53.1]  Left hip pain [M25.552]  Fall, initial encounter [W19.XXXA]  Acute pain of both knees [M25.561, M25.562]  Abrasion of knee, bilateral [S80.211A, S80.212A]    Active and Resolved Hospital Problems:  Active Hospital Problems    Diagnosis POA    **Weakness [R53.1] Yes      Resolved Hospital Problems   No resolved problems to display.         Hospital Course     HPI:  \" Ap Roe is a 89 y.o. male with a CMH of diabetes, coronary artery disease who presented to [facility] on 2024 with a fall that occurred at home.  He said he was walking up the steps in his garage and fell landing on his knees.  He is also complaining of left hip pain. He did not hit his head.  On ED evaluation no fractures were identified. Patient was originally admitted under observation status.  PT and OT have evaluated this patient and they are recommending inpatient rehab. Hospitalist team to admit for further medical management. \"    Hospital Course:  Fall   Weakness  - X-ray of knee showed degenerative changes with no fractures and soft tissues swelling on the left  - X-ray of hip showed no acute abnormality  - fall precautions  - PT/OT consulted recommending inpatient rehab  - Pre-cert for [facility]OBITO rehab approved.     Hypomagnesemia  - Magnesium: 1.3 on admit   - Mag sulfate given in the ED  - Repeat mg back to nl.     Hyponatremia /CHF exacerbation  - appears chronic   - Sodium: 132, 130 at baseline   -Renal was consulted and patient was started on Lasix and then switched to Bumex.  Patient also received albumin, urea and salt tablets.  Patient was also started on Jardiance 10 mg daily.    Diabetes mellitus  -   - Held " metformin due to renal insufficiency  - discontinue Actos due to CHF exacerbation and started on Jardiance 10 mg, Lantus 10 units and sliding scale insulin  - SSI ordered for rehab  - Diabetic diet     Anemia   - Hgb 10.8  - No overt s/sx of bleeding   - Continue to monitor CBC       Atrial fibrillation  -Continued amiodarone, propranolol and Eliquis     Hypertension  - /81  -Patient blood pressure was high later it was low so losartan was discontinued and patient will remain on propranolol for A-fib rate control and blood pressure.       DISCHARGE Follow Up Recommendations for labs and diagnostics:   Patient will follow-up with PCP in 2 to 3 days and with renal in 6 to 8 weeks.    Reasons For Change In Medications and Indications for New Medications:  Metformin and Actos were discontinued due to GI and heart failure.  Jardiance 10 mg was started for diabetes and heart failure.    Day of Discharge     Vital Signs:  Temp:  [97.2 °F (36.2 °C)-98.3 °F (36.8 °C)] 97.2 °F (36.2 °C)  Heart Rate:  [60-63] 61  Resp:  [13-15] 15  BP: ()/(58-66) 112/66    Physical Exam:  Vitals and nursing note reviewed.   Constitutional:       Appearance: Normal appearance.   HENT:      Head: Normocephalic and atraumatic.   Cardiovascular:      Rate and Rhythm: Normal rate and regular rhythm.      Heart sounds: Normal heart sounds.  1+ pitting edema  Pulmonary:      Effort: Pulmonary effort is normal.      Breath sounds: Decreased breath sounds.   Abdominal:      Palpations: Abdomen is soft.   Musculoskeletal:      Cervical back: Neck supple.   Neurological:      Mental Status: Mental status is at baseline.     Pertinent  and/or Most Recent Results     LAB RESULTS:      Lab 12/10/24  0325 12/09/24  0514 12/08/24  0825 12/05/24  1014 12/04/24  0301   WBC 6.57 7.70 8.49 6.03 5.67   HEMOGLOBIN 9.9* 9.7* 10.0* 10.6* 10.6*   HEMATOCRIT 29.0* 30.2* 29.8* 30.9* 31.2*   PLATELETS 203 176 185 188 184   NEUTROS ABS  --   --   --  3.47  --     IMMATURE GRANS (ABS)  --   --   --  0.02  --    LYMPHS ABS  --   --   --  1.52  --    MONOS ABS  --   --   --  0.90  --    EOS ABS  --   --   --  0.10  --    MCV 94.2 100.7* 94.6 92.2 93.4         Lab 12/10/24  0325 12/09/24  0514 12/08/24  0825 12/07/24  0856 12/06/24  0735 12/05/24  0943 12/04/24  0301   SODIUM 129* 129* 129* 128* 127*   < > 128*   POTASSIUM 4.4 4.4 5.2 5.0 4.3   < > 4.8   CHLORIDE 93* 95* 93* 94* 94*   < > 95*   CO2 24.5 22.6 23.8 23.4 22.5   < > 22.1   ANION GAP 11.5 11.4 12.2 10.6 10.5   < > 10.9   BUN 61* 52* 45* 38* 32*   < > 34*   CREATININE 1.75* 1.46* 1.42* 1.43* 1.46*   < > 1.46*   EGFR 35.2* 43.7* 45.2* 44.8* 43.7*   < > 43.7*   GLUCOSE 106* 68 227* 212* 169*   < > 175*   CALCIUM 9.1 9.1 9.3 9.2 8.9   < > 9.3   MAGNESIUM 2.1 2.0 1.9 2.0 2.2   < > 1.7   PHOSPHORUS  --   --   --   --   --   --  4.1    < > = values in this interval not displayed.         Lab 12/05/24  0943   TOTAL PROTEIN 4.8*   ALBUMIN 2.6*   GLOBULIN 2.2   ALT (SGPT) 13   AST (SGOT) 35   BILIRUBIN 0.5   ALK PHOS 71                     Brief Urine Lab Results  (Last result in the past 365 days)        Color   Clarity   Blood   Leuk Est   Nitrite   Protein   CREAT   Urine HCG        11/29/24 1208 Yellow   Clear   Negative   Negative   Negative   Negative                 Microbiology Results (last 10 days)       ** No results found for the last 240 hours. **            XR Hip With or Without Pelvis 2 - 3 View Left    Result Date: 11/26/2024  Impression: Impression: Negative for acute osseous abnormality. Electronically Signed: Damian Keen MD  11/26/2024 5:50 PM EST  Workstation ID: [id number]    XR Knee 1 or 2 View Bilateral    Result Date: 11/26/2024  Impression: Impression: 1.Degenerative changes in both knees. 2.No fractures are identified. 3.Soft tissue swelling laterally on the left. Electronically Signed: Jose Cassidy MD  11/26/2024 4:18 PM EST  Workstation ID: [id number]             Results for orders placed during  the hospital encounter of 11/26/24    Adult Transthoracic Echo Complete W/ Cont if Necessary Per Protocol    Interpretation Summary    Left ventricular systolic function is low normal. Left ventricular ejection fraction appears to be 51 - 55%.    Left ventricular diastolic function is consistent with (grade Ia w/high LAP) impaired relaxation.    The left atrial cavity is moderately dilated.    Left atrial volume is moderately increased.    There is moderate calcification of the aortic valve.    Estimated right ventricular systolic pressure from tricuspid regurgitation is normal (<35 mmHg).      Labs Pending at Discharge:  Pending Results       None            Procedures Performed         Consults:   Consults       Date and Time Order Name Status Description    12/3/2024  7:39 AM Inpatient Nephrology Consult Completed     11/27/2024  4:37 PM Inpatient Hospitalist Consult              Discharge Details        Discharge Medications        New Medications        Instructions Start Date   bumetanide 1 MG tablet  Commonly known as: BUMEX   1 mg, Oral, Daily   Start Date: December 11, 2024     empagliflozin 10 MG tablet tablet  Commonly known as: JARDIANCE   10 mg, Oral, Daily   Start Date: December 11, 2024     insulin lispro 100 UNIT/ML injection  Commonly known as: HUMALOG/ADMELOG   2-7 Units, Subcutaneous, 4 Times Daily Before Meals & Nightly      polyethylene glycol 17 g packet  Commonly known as: MIRALAX   17 g, Oral, Daily PRN             Continue These Medications        Instructions Start Date   amiodarone 200 MG tablet  Commonly known as: PACERONE   200 mg, Daily      Eliquis 5 MG tablet tablet  Generic drug: apixaban   1 tablet, Every 12 Hours Scheduled      propranolol 80 MG tablet  Commonly known as: INDERAL   1 tablet, Daily      simvastatin 20 MG tablet  Commonly known as: ZOCOR   1 tablet, Daily      tamsulosin 0.4 MG capsule 24 hr capsule  Commonly known as: FLOMAX   1 capsule, Daily             Stop These  Medications      furosemide 20 MG tablet  Commonly known as: LASIX     losartan 100 MG tablet  Commonly known as: COZAAR     metFORMIN 1000 MG tablet  Commonly known as: GLUCOPHAGE     pioglitazone 30 MG tablet  Commonly known as: ACTOS              Allergies   Allergen Reactions    Celebrex [Celecoxib] Hives       Discharge Disposition:   Home or Self Care    Diet:  Diet Instructions       Diet: Cardiac Diets, Diabetic Diets; Low Sodium (2g); Regular (IDDSI 7); Thin (IDDSI 0); Consistent Carbohydrate      Discharge Diet:  Cardiac Diets  Diabetic Diets       Cardiac Diet: Low Sodium (2g)    Texture: Regular (IDDSI 7)    Fluid Consistency: Thin (IDDSI 0)    Diabetic Diet: Consistent Carbohydrate            Discharge Activity:   Activity Instructions       Activity as Tolerated              CODE STATUS:  Code Status and Medical Interventions: No CPR (Do Not Attempt to Resuscitate); Full Support   Ordered at: 11/28/24 1024     Level Of Support Discussed With:    Next of Kin (If No Surrogate)     Code Status (Patient has no pulse and is not breathing):    No CPR (Do Not Attempt to Resuscitate)     Medical Interventions (Patient has pulse or is breathing):    Full Support       No future appointments.    Additional Instructions for the Follow-ups that You Need to Schedule       Discharge Follow-up with PCP   As directed       Currently Documented PCP:    Elva Mota MD    PCP Phone Number:    [phone]     Follow Up Details: 2-3 days        Discharge Follow-up with Specified Provider: Dr. Perez; 6 Weeks   As directed      To: Dr. Perez   Follow Up: 6 Weeks   Follow Up Details: f/u on renal function                Time spent on Discharge including face to face service:  >30 minutes    Signature: Electronically signed by Ryann Bond MD, 12/10/24, 14:05 [facility]ist Team     Electronically signed by Ryann Bond MD at 12/10/24 9122

## 2024-12-11 NOTE — CASE MANAGEMENT/SOCIAL WORK
Case Management Discharge Note      Final Note: [facility]      Selected Continued Care - Discharged on 12/10/2024 Admission date: 11/26/2024 - Discharge disposition: Rehab Facility or Unit (DC - External)      Destination Coordination complete.      Service Provider Services Address Phone Fax Patient Preferred    [facility] [address], [place] [address] [phone]                     Transportation Services  W/C Van: Other (Assisting Transport)    Final Discharge Disposition Code: 03 - skilled nursing facility (SNF)